# Patient Record
Sex: FEMALE | Race: WHITE | Employment: OTHER | URBAN - METROPOLITAN AREA
[De-identification: names, ages, dates, MRNs, and addresses within clinical notes are randomized per-mention and may not be internally consistent; named-entity substitution may affect disease eponyms.]

---

## 2017-02-22 RX ORDER — PRAVASTATIN SODIUM 40 MG/1
TABLET ORAL
Qty: 90 TAB | Refills: 0 | Status: SHIPPED | OUTPATIENT
Start: 2017-02-22 | End: 2017-11-29 | Stop reason: ALTCHOICE

## 2017-03-01 DIAGNOSIS — J98.01 BRONCHOSPASM: ICD-10-CM

## 2017-03-01 RX ORDER — MONTELUKAST SODIUM 10 MG/1
TABLET ORAL
Qty: 90 TAB | Refills: 1 | Status: SHIPPED | OUTPATIENT
Start: 2017-03-01 | End: 2017-09-28 | Stop reason: SDUPTHER

## 2017-05-03 ENCOUNTER — OFFICE VISIT (OUTPATIENT)
Dept: INTERNAL MEDICINE CLINIC | Age: 62
End: 2017-05-03

## 2017-05-03 VITALS
SYSTOLIC BLOOD PRESSURE: 160 MMHG | RESPIRATION RATE: 22 BRPM | HEIGHT: 63 IN | TEMPERATURE: 97.7 F | BODY MASS INDEX: 44.12 KG/M2 | DIASTOLIC BLOOD PRESSURE: 82 MMHG | HEART RATE: 49 BPM | WEIGHT: 249 LBS | OXYGEN SATURATION: 96 %

## 2017-05-03 DIAGNOSIS — I10 ESSENTIAL HYPERTENSION: ICD-10-CM

## 2017-05-03 DIAGNOSIS — Z11.59 NEED FOR HEPATITIS C SCREENING TEST: ICD-10-CM

## 2017-05-03 DIAGNOSIS — Z00.00 WELL ADULT EXAM: Primary | ICD-10-CM

## 2017-05-03 DIAGNOSIS — N39.41 URGE INCONTINENCE: ICD-10-CM

## 2017-05-03 DIAGNOSIS — R05.9 COUGH: ICD-10-CM

## 2017-05-03 DIAGNOSIS — E55.9 VITAMIN D DEFICIENCY: ICD-10-CM

## 2017-05-03 DIAGNOSIS — Z12.39 BREAST CANCER SCREENING: ICD-10-CM

## 2017-05-03 RX ORDER — LEVOCETIRIZINE DIHYDROCHLORIDE 5 MG/1
TABLET, FILM COATED ORAL
COMMUNITY
End: 2017-05-24

## 2017-05-03 RX ORDER — HYDROCHLOROTHIAZIDE 12.5 MG/1
12.5 TABLET ORAL DAILY
Qty: 30 TAB | Refills: 2 | Status: SHIPPED | OUTPATIENT
Start: 2017-05-03 | End: 2017-05-31 | Stop reason: SDUPTHER

## 2017-05-03 NOTE — PROGRESS NOTES
Jenifer Luong is a 57 y.o. female and presents with Complete Physical  .  Subjective:    Pt here for follow up annual.      Subjective:   Jenifer Luong is a 57 y.o. female with hypertension. Hypertension ROS: taking medications as instructed, no medication side effects noted, no TIA's, no chest pain on exertion, no dyspnea on exertion, no swelling of ankles. New concerns needs diuretic as her legs have edema      Cough  allergeis  On singulair  zytal 5 mg, flonase nasal spary  She has not seen allergist    incontinece  Working with dr. 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 hoarseness  Respiratory:  negative for cough, hemoptysis, dyspnea,wheezing  CV:   negative for chest pain, palpitations, lower extremity edema  GI:   negative for nausea, vomiting, diarrhea, abdominal pain,melena  Endo:               negative for polyuria,polydipsia,polyphagia,heat intolerance  Genitourinary: negative for frequency, dysuria and hematuria  Integument:  negative for rash and pruritus  Hematologic:  negative for easy bruising and gum/nose bleeding  Musculoskel: negative for myalgias, arthralgias, back pain, muscle weakness, joint pain  Neurological:  negative for headaches, dizziness, vertigo, memory problems and gait   Behavl/Psych: negative for feelings of anxiety, depression, mood changes    Past Medical History:   Diagnosis Date    Arthritis     total knee    Endometriosis 1/30/2009    resolved with hysterectomy    Hypertension     Ill-defined condition     IBS    Morbid obesity ([facility] [address]     Urge incontinence 8/25/2010    abdullahi galarza     Past Surgical History: Procedure Laterality Date    HX APPENDECTOMY  2010    HX COLONOSCOPY  11/8/15       10 years again    HX GYN      c section times 3    HX GYN      ectopic r fallopian tube resected    HX HYSTERECTOMY  1998    HX ORTHOPAEDIC  2006    right wrist fx    HX ORTHOPAEDIC  2010    left knee replacement     HX UROLOGICAL  2014,   12/3/15    Urethral sling      Social History     Social History    Marital status:      Spouse name: N/A    Number of children: N/A    Years of education: N/A     Social History Main Topics    Smoking status: Never Smoker    Smokeless tobacco: Never Used    Alcohol use 1.0 oz/week     2 Glasses of wine per week      Comment: on saturedays    Drug use: No    Sexual activity: Yes     Other Topics Concern    None     Social History Narrative    [employer]. She does the Financial, Administative very busy in the nursing    Enjoys it, not stressfull, flexibility            3 sons healthy:  Inflammatory Bowel 1 son             Family History   Problem Relation Age of Onset    Cancer Father      prostate ca    Cancer Sister      lung ca 55    Heart Disease Mother 68     a fib     Current Outpatient Prescriptions   Medication Sig Dispense Refill    levocetirizine (XYZAL) 5 mg tablet Take  by mouth.  montelukast (SINGULAIR) 10 mg tablet TAKE 1 TABLET NIGHTLY 90 Tab 1    pravastatin (PRAVACHOL) 40 mg tablet TAKE 1 TABLET NIGHTLY 90 Tab 0    valsartan (DIOVAN) 320 mg tablet Take 1 Tab by mouth daily. Indications: HYPERTENSION 90 Tab 3    fluticasone (FLONASE) 50 mcg/actuation nasal spray 2 Sprays by Both Nostrils route daily. 1 Bottle 6    TURMERIC ROOT EXTRACT PO Take  by mouth.  FIBER, PSYLLIUM HUSK, PO Take  by mouth daily.  magnesium 250 mg tab Take 400 mg by mouth daily.  omega-3 fatty acids-vitamin e (FISH OIL) 1,000 mg Cap Take 3 capsules by mouth daily.       cholecalciferol, vitamin d3, (VITAMIN D) 1,000 unit tablet Take 2,000 Units by mouth daily.  diphenoxylate-atropine (LOMOTIL) 2.5-0.025 mg per tablet Take 1 Tab by mouth three (3) times daily as needed for Diarrhea. Max Daily Amount: 3 Tabs. 30 Tab 0    fluticasone-salmeterol (ADVAIR) 250-50 mcg/dose diskus inhaler Take 1 Puff by inhalation two (2) times a day. MUST rinse mouth after use 2 Inhaler 3    albuterol (PROVENTIL HFA, VENTOLIN HFA, PROAIR HFA) 90 mcg/actuation inhaler Take 2 Puffs by inhalation every four (4) hours as needed for Wheezing or Shortness of Breath. 1 Inhaler 6    cetirizine (ZYRTEC) 10 mg tablet Take  by mouth daily. Allergies   Allergen Reactions    Codeine Nausea and Vomiting    Erythromycin Rash    Hydrocodone Rash       Objective:  Visit Vitals    /70 (BP 1 Location: Left arm, BP Patient Position: Sitting)    Pulse (!) 49    Temp 97.7 °F (36.5 °C) (Oral)    Resp 22    Ht 5' 3\" (1.6 m)    Wt 249 lb (112.9 kg)    SpO2 96%    BMI 44.11 kg/m2     Physical Exam:   General appearance - alert, well appearing, and in no distress and overweight  Mental status - alert, oriented to person, place, and time  EYE-PATRICK, EOMI, no foreign bodies, visual acuity normal both eyes, cerumen left ear  ENT-ENT exam normal, no neck nodes or sinus tenderness  Nose - normal and patent, no erythema, discharge or polyps, enlarged nasal turbinates  Mouth - mucous membranes moist, pharynx normal without lesions  Neck - supple, no significant adenopathy   Chest - clear to auscultation, no wheezes, rales or rhonchi, symmetric air entry   Heart - normal rate, regular rhythm, normal S1, S2, no murmurs, rubs, clicks or gallops   Abdomen - soft, nontender, nondistended, no masses or organomegaly  Lymph- no adenopathy palpable  Ext-peripheral pulses normal, no pedal edema, no clubbing or cyanosis  Skin-Warm and dry.  no hyperpigmentation, vitiligo, or suspicious lesions  Neuro -alert, oriented, normal speech, no focal findings or movement disorder noted  Neck-normal C-spine, no tenderness, full ROM without pain      Results for orders placed or performed in visit on 10/31/16   CULTURE, URINE   Result Value Ref Range    Urine Culture, Routine No growth    AMB POC URINALYSIS DIP STICK AUTO W/O MICRO   Result Value Ref Range    Color (UA POC) Yellow     Clarity (UA POC) Cloudy     Glucose (UA POC) Negative Negative    Bilirubin (UA POC) Negative Negative    Ketones (UA POC) Negative Negative    Specific gravity (UA POC) 1.020 1.001 - 1.035    Blood (UA POC) 1+ Negative    pH (UA POC) 5.5 4.6 - 8.0    Protein (UA POC) Negative Negative mg/dL    Urobilinogen (UA POC) 0.2 mg/dL 0.2 - 1    Nitrites (UA POC) Negative Negative    Leukocyte esterase (UA POC) 3+ Negative       Labs: cmp reviewed with pt from January      Prevention    Cardiovascular profile  Family hx  Exercising:  Walking   Blood pressure:  Health healthy diet:  Diabetes:  Cholesterol:  Renal function:      Cancer risk profile  Mammogram 10/15  Lung  Colonoscopy 11/15  Skin nonhealing in 2 weeks none, Dr. Cervantes Abt abnormal bleeding/discharge/abd pain/pressure hysterectomy      Thyroid sx  constipation    Osteopenia prevention stopped due to kidney stone  BMD  Family hx of kidney stones  Calcium 1000mg/day yes  Vitamin D 800iu/day yes    Mental health scale: 7-8/10 , 6-7/10 sad since residential  Depression  Anxiety  Sleep # of hours:  Energy Level:        Immunizations  TDAP  Pneumonia vaccine pneumovax  Flu vaccine  Shingles vaccine  HPV  Shingles vaccine not a bad outbreak        Edmar Feldman was seen today for complete physical.    Diagnoses and all orders for this visit:    Well adult exam  Pt appears in overall good health. She has a little dysthymia re: her residential. Will monitor. Discussed and will take opportunity to get health improved with allergy evaluation and BMI weight loss.  Pt is excited to do program.    Need for hepatitis C screening test  -     HEPATITIS C AB    Essential hypertension  Add hctz new med  Sed  Follow up in 1 month  -     METABOLIC PANEL, COMPREHENSIVE  -     LIPID PANEL  -     MICROALBUMIN, UR, RAND W/ MICROALBUMIN/CREA RATIO  -     URINALYSIS W/ RFLX MICROSCOPIC    Urge incontinence  Follow up with dr. Manny New    [facility] 40.0-44.9, [facility]  -     REFERRAL TO WEIGHT LOSS  -     TSH 3RD GENERATION    Cough  -     REFERRAL TO ALLERGY  -     CBC W/O DIFF    Breast cancer screening  -     MARIA R MAMMO BI SCREENING INCL CAD; Future    Vitamin D deficiency  -     VITAMIN D, 25 HYDROXY    Other orders  -     hydroCHLOROthiazide (HYDRODIURIL) 12.5 mg tablet; Take 1 Tab by mouth daily. This note will not be viewable in [address]

## 2017-05-03 NOTE — MR AVS SNAPSHOT
Visit Information Date & Time Provider Department Dept. Phone Encounter #  
 5/3/2017  8:45 AM Aries Kaur MD [facility] [address] [id number] Upcoming Health Maintenance Date Due Hepatitis C Screening 1955 FOBT Q 1 YEAR AGE 50-75 5/31/2017 INFLUENZA AGE 9 TO ADULT 8/1/2017 BREAST CANCER SCRN MAMMOGRAM 10/22/2017 PAP AKA CERVICAL CYTOLOGY 5/31/2019 DTaP/Tdap/Td series (2 - Td) 5/31/2026 Allergies as of 5/3/2017  Review Complete On: 5/3/2017 By: Autumn Farmer LPN Severity Noted Reaction Type Reactions Codeine  08/25/2010    Nausea and Vomiting Erythromycin  11/21/2008    Rash Hydrocodone  11/24/2015    Rash Current Immunizations  Reviewed on 5/31/2016 Name Date Tdap 5/31/2016 Zoster Vaccine, Live 3/7/2012 Not reviewed this visit You Were Diagnosed With   
  
 Codes Comments Well adult exam    -  Primary ICD-10-CM: Z00.00 ICD-9-CM: V70.0 Need for hepatitis C screening test     ICD-10-CM: Z11.59 
ICD-9-CM: V73.89 Essential hypertension     ICD-10-CM: I10 
ICD-9-CM: 401.9 Urge incontinence     ICD-10-CM: N39.41 
ICD-9-CM: 788.31   
 BMI 40.0-44.9, adult (HCC)     ICD-10-CM: Z68.41 
ICD-9-CM: V85.41 Cough     ICD-10-CM: R05 ICD-9-CM: 786.2 Breast cancer screening     ICD-10-CM: Z12.39 
ICD-9-CM: V76.10 Vitamin D deficiency     ICD-10-CM: E55.9 ICD-9-CM: 268.9 Vitals BP Pulse Temp Resp Height(growth percentile) Weight(growth percentile) 160/82 (BP 1 Location: Left arm, BP Patient Position: Sitting) (!) 49 97.7 °F (36.5 °C) (Oral) 22 5' 3\" (1.6 m) 249 lb (112.9 kg) SpO2 BMI OB Status Smoking Status 96% 44.11 kg/m2 Hysterectomy Never Smoker Vitals History BMI and BSA Data Body Mass Index Body Surface Area  
 44.11 kg/m 2 2.24 m 2 Preferred Pharmacy Pharmacy Name Phone [id number] [address] ([place] [address]) [phone] Your Updated Medication List  
  
   
This list is accurate as of: 5/3/17  9:56 AM.  Always use your most recent med list.  
  
  
  
  
 albuterol 90 mcg/actuation inhaler Commonly known as:  PROVENTIL HFA, VENTOLIN HFA, PROAIR HFA Take 2 Puffs by inhalation every four (4) hours as needed for Wheezing or Shortness of Breath. diphenoxylate-atropine 2.5-0.025 mg per tablet Commonly known as:  LOMOTIL Take 1 Tab by mouth three (3) times daily as needed for Diarrhea. Max Daily Amount: 3 Tabs. FIBER (PSYLLIUM HUSK) PO Take  by mouth daily. FISH OIL 1,000 mg Cap Generic drug:  omega-3 fatty acids-vitamin e Take 3 capsules by mouth daily. fluticasone 50 mcg/actuation nasal spray Commonly known as:  Stoke 2 Sprays by Both Nostrils route daily. fluticasone-salmeterol 250-50 mcg/dose diskus inhaler Commonly known as:  ADVAIR Take 1 Puff by inhalation two (2) times a day. MUST rinse mouth after use  
  
 hydroCHLOROthiazide 12.5 mg tablet Commonly known as:  HYDRODIURIL Take 1 Tab by mouth daily. magnesium 250 mg Tab Take 400 mg by mouth daily. montelukast 10 mg tablet Commonly known as:  SINGULAIR  
TAKE 1 TABLET NIGHTLY  
  
 pravastatin 40 mg tablet Commonly known as:  PRAVACHOL  
TAKE 1 TABLET NIGHTLY TURMERIC ROOT EXTRACT PO Take  by mouth.  
  
 valsartan 320 mg tablet Commonly known as:  DIOVAN Take 1 Tab by mouth daily. Indications: HYPERTENSION  
  
 VITAMIN D3 1,000 unit tablet Generic drug:  cholecalciferol Take 2,000 Units by mouth daily. XYZAL 5 mg tablet Generic drug:  levocetirizine Take  by mouth. ZyrTEC 10 mg tablet Generic drug:  cetirizine Take  by mouth daily. Prescriptions Sent to Pharmacy  Refills  
 hydroCHLOROthiazide (HYDRODIURIL) 12.5 mg tablet 2  
 Sig: Take 1 Tab by mouth daily. Class: Normal  
 Pharmacy: [id number]- [address], [address] ([place] [address]) Ph #: [phone] Route: Oral  
  
We Performed the Following CBC W/O DIFF [75141 CPT(R)] HEPATITIS C AB [30524 CPT(R)] LIPID PANEL [84359 CPT(R)] METABOLIC PANEL, COMPREHENSIVE [92704 CPT(R)] MICROALBUMIN, UR, RAND W/ MICROALBUMIN/CREA RATIO [id number] CPT(R)] REFERRAL TO ALLERGY [REF5 Custom] Comments:  
 Perley Alpers, persistent cough likely allergy related please eval and jw REFERRAL TO WEIGHT LOSS [HUT721 Custom] Comments:  
 Please evaluate patient for weight loss. TSH 3RD GENERATION [10412 CPT(R)] URINALYSIS W/ RFLX MICROSCOPIC [71494 CPT(R)] VITAMIN D, 25 HYDROXY [id number] CPT(R)] To-Do List   
 10/03/2017 Imaging:  MARIA R MAMMO BI SCREENING INCL CAD Referral Information Referral ID Referred By Referred To  
  
 [id number] Lj People E Not Available Visits Status Start Date End Date 1 New Request 5/3/17 5/3/18 If your referral has a status of pending review or denied, additional information will be sent to support the outcome of this decision. Referral ID Referred By Referred To  
 [id number] Gogo Day MD  
   [address] [place] [address] Phone: [phone] Fax: [phone] Visits Status Start Date End Date 1 New Request 5/3/17 5/3/18 If your referral has a status of pending review or denied, additional information will be sent to support the outcome of this decision. Introducing [facility]! Dear Paul Miranda: Thank you for requesting a Quture account. Our records indicate that you already have an active Quture account. You can access your account anytime at [email or web address]. [employer]/[facility] Did you know that you can access your hospital and ER discharge instructions at any time in Exablox? You can also review all of your test results from your hospital stay or ER visit. Additional Information If you have questions, please visit the Frequently Asked Questions section of the Exablox website at [email or web address] Remember, Exablox is NOT to be used for urgent needs. For medical emergencies, dial 911. Now available from your iPhone and Android! Please provide this summary of care documentation to your next provider. Your primary care clinician is listed as Marielos Kaur. If you have any questions after today's visit, please call [phone].

## 2017-05-05 LAB
25(OH)D3+25(OH)D2 SERPL-MCNC: 28.1 NG/ML (ref 30–100)
ALBUMIN SERPL-MCNC: 4.2 G/DL (ref 3.6–4.8)
ALBUMIN/CREAT UR: 3.2 MG/G CREAT (ref 0–30)
ALBUMIN/GLOB SERPL: 1.8 {RATIO} (ref 1.2–2.2)
ALP SERPL-CCNC: 56 IU/L (ref 39–117)
ALT SERPL-CCNC: 51 IU/L (ref 0–32)
APPEARANCE UR: ABNORMAL
AST SERPL-CCNC: 32 IU/L (ref 0–40)
BACTERIA #/AREA URNS HPF: ABNORMAL /[HPF]
BILIRUB SERPL-MCNC: 0.6 MG/DL (ref 0–1.2)
BILIRUB UR QL STRIP: NEGATIVE
BUN SERPL-MCNC: 16 MG/DL (ref 8–27)
BUN/CREAT SERPL: 21 (ref 12–28)
CALCIUM SERPL-MCNC: 10 MG/DL (ref 8.7–10.3)
CASTS URNS QL MICRO: ABNORMAL /LPF
CHLORIDE SERPL-SCNC: 103 MMOL/L (ref 96–106)
CHOLEST SERPL-MCNC: 151 MG/DL (ref 100–199)
CO2 SERPL-SCNC: 23 MMOL/L (ref 18–29)
COLOR UR: YELLOW
CREAT SERPL-MCNC: 0.76 MG/DL (ref 0.57–1)
CREAT UR-MCNC: 101.2 MG/DL
EPI CELLS #/AREA URNS HPF: ABNORMAL /HPF
ERYTHROCYTE [DISTWIDTH] IN BLOOD BY AUTOMATED COUNT: 13.2 % (ref 12.3–15.4)
GLOBULIN SER CALC-MCNC: 2.4 G/DL (ref 1.5–4.5)
GLUCOSE SERPL-MCNC: 105 MG/DL (ref 65–99)
GLUCOSE UR QL: NEGATIVE
HCT VFR BLD AUTO: 40.1 % (ref 34–46.6)
HCV AB S/CO SERPL IA: <0.1 S/CO RATIO (ref 0–0.9)
HDLC SERPL-MCNC: 44 MG/DL
HGB BLD-MCNC: 13.2 G/DL (ref 11.1–15.9)
HGB UR QL STRIP: NEGATIVE
INTERPRETATION, 910389: NORMAL
KETONES UR QL STRIP: NEGATIVE
LDLC SERPL CALC-MCNC: 76 MG/DL (ref 0–99)
LEUKOCYTE ESTERASE UR QL STRIP: ABNORMAL
MCH RBC QN AUTO: 29.2 PG (ref 26.6–33)
MCHC RBC AUTO-ENTMCNC: 32.9 G/DL (ref 31.5–35.7)
MCV RBC AUTO: 89 FL (ref 79–97)
MICRO URNS: ABNORMAL
MICROALBUMIN UR-MCNC: 3.2 UG/ML
MUCOUS THREADS URNS QL MICRO: PRESENT
NITRITE UR QL STRIP: NEGATIVE
PH UR STRIP: 6 [PH] (ref 5–7.5)
PLATELET # BLD AUTO: 166 X10E3/UL (ref 150–379)
POTASSIUM SERPL-SCNC: 4.5 MMOL/L (ref 3.5–5.2)
PROT SERPL-MCNC: 6.6 G/DL (ref 6–8.5)
PROT UR QL STRIP: NEGATIVE
RBC # BLD AUTO: 4.52 X10E6/UL (ref 3.77–5.28)
RBC #/AREA URNS HPF: ABNORMAL /HPF
SODIUM SERPL-SCNC: 141 MMOL/L (ref 134–144)
SP GR UR: 1.02 (ref 1–1.03)
TRIGL SERPL-MCNC: 154 MG/DL (ref 0–149)
TSH SERPL DL<=0.005 MIU/L-ACNC: 2.23 UIU/ML (ref 0.45–4.5)
UROBILINOGEN UR STRIP-MCNC: 0.2 MG/DL (ref 0.2–1)
VLDLC SERPL CALC-MCNC: 31 MG/DL (ref 5–40)
WBC # BLD AUTO: 4.7 X10E3/UL (ref 3.4–10.8)
WBC #/AREA URNS HPF: >30 /HPF
YEAST #/AREA URNS HPF: PRESENT /[HPF]

## 2017-05-11 RX ORDER — CLONIDINE HYDROCHLORIDE 0.1 MG/1
0.1 TABLET ORAL 2 TIMES DAILY
Qty: 60 TAB | Refills: 1 | Status: SHIPPED | OUTPATIENT
Start: 2017-05-11 | End: 2017-05-31 | Stop reason: ALTCHOICE

## 2017-05-24 ENCOUNTER — OFFICE VISIT (OUTPATIENT)
Dept: FAMILY MEDICINE CLINIC | Age: 62
End: 2017-05-24

## 2017-05-24 VITALS
OXYGEN SATURATION: 96 % | WEIGHT: 237.9 LBS | BODY MASS INDEX: 42.15 KG/M2 | RESPIRATION RATE: 17 BRPM | DIASTOLIC BLOOD PRESSURE: 79 MMHG | SYSTOLIC BLOOD PRESSURE: 150 MMHG | HEIGHT: 63 IN | HEART RATE: 54 BPM | TEMPERATURE: 97.6 F

## 2017-05-24 DIAGNOSIS — R06.83 SNORING: ICD-10-CM

## 2017-05-24 DIAGNOSIS — Z13.1 SCREENING FOR DIABETES MELLITUS: ICD-10-CM

## 2017-05-24 DIAGNOSIS — R79.89 ABNORMAL LIVER FUNCTION TEST: ICD-10-CM

## 2017-05-24 DIAGNOSIS — I10 ESSENTIAL HYPERTENSION: Primary | ICD-10-CM

## 2017-05-24 DIAGNOSIS — E66.01 SEVERE OBESITY (BMI >= 40) (HCC): ICD-10-CM

## 2017-05-24 NOTE — MR AVS SNAPSHOT
Visit Information Date & Time Provider Department Dept. Phone Encounter #  
 5/24/2017  2:00 PM Mingo Ann MD [address] [place] [address] [id number] Your Appointments 5/31/2017  8:00 AM  
ROUTINE CARE with Chan Zhang MD  
[facility]) Appt Note: 1 month [address] [place] [id number]  
[phone]  
  
   
 [address] [facility] [address] Upcoming Health Maintenance Date Due FOBT Q 1 YEAR AGE 50-75 5/31/2017 INFLUENZA AGE 9 TO ADULT 8/1/2017 BREAST CANCER SCRN MAMMOGRAM 10/22/2017 PAP AKA CERVICAL CYTOLOGY 5/31/2019 DTaP/Tdap/Td series (2 - Td) 5/31/2026 Allergies as of 5/24/2017  Review Complete On: 5/24/2017 By: Mingo Ann MD  
  
 Severity Noted Reaction Type Reactions Codeine  08/25/2010    Nausea and Vomiting Erythromycin  11/21/2008    Rash Hydrocodone  11/24/2015    Rash Current Immunizations  Reviewed on 5/31/2016 Name Date Tdap 5/31/2016 Zoster Vaccine, Live 3/7/2012 Not reviewed this visit You Were Diagnosed With   
  
 Codes Comments Essential hypertension    -  Primary ICD-10-CM: I10 
ICD-9-CM: 401.9 Severe obesity (BMI >= 40) (HCC)     ICD-10-CM: E66.01 
ICD-9-CM: 278.01 Abnormal liver function test     ICD-10-CM: R79.89 ICD-9-CM: 790.6 Snoring     ICD-10-CM: R06.83 
ICD-9-CM: 786.09 Screening for diabetes mellitus     ICD-10-CM: Z13.1 ICD-9-CM: V77.1 Vitals BP Pulse Temp Resp Height(growth percentile) Weight(growth percentile) 150/79 (!) 54 97.6 °F (36.4 °C) (Oral) 17 5' 3\" (1.6 m) 237 lb 14.4 oz (107.9 kg) SpO2 BMI OB Status Smoking Status 96% 42.14 kg/m2 Hysterectomy Never Smoker Vitals History BMI and BSA Data Body Mass Index Body Surface Area  
 42.14 kg/m 2 2.19 m 2 Preferred Pharmacy Pharmacy Name Phone [facility]/PHARMACY #[id number]- [place], [address] ([place] [address]) [phone] Your Updated Medication List  
  
   
This list is accurate as of: 5/24/17  3:07 PM.  Always use your most recent med list.  
  
  
  
  
 albuterol 90 mcg/actuation inhaler Commonly known as:  PROVENTIL HFA, VENTOLIN HFA, PROAIR HFA Take 2 Puffs by inhalation every four (4) hours as needed for Wheezing or Shortness of Breath. cloNIDine HCl 0.1 mg tablet Commonly known as:  CATAPRES Take 1 Tab by mouth two (2) times a day. diphenoxylate-atropine 2.5-0.025 mg per tablet Commonly known as:  LOMOTIL Take 1 Tab by mouth three (3) times daily as needed for Diarrhea. Max Daily Amount: 3 Tabs. FIBER (PSYLLIUM HUSK) PO Take  by mouth daily. FISH OIL 1,000 mg Cap Generic drug:  omega-3 fatty acids-vitamin e Take 3 capsules by mouth daily. fluticasone 50 mcg/actuation nasal spray Commonly known as:  Aracelis Rigoberto 2 Sprays by Both Nostrils route daily. fluticasone-salmeterol 250-50 mcg/dose diskus inhaler Commonly known as:  ADVAIR Take 1 Puff by inhalation two (2) times a day. MUST rinse mouth after use  
  
 hydroCHLOROthiazide 12.5 mg tablet Commonly known as:  HYDRODIURIL Take 1 Tab by mouth daily. magnesium 250 mg Tab Take 400 mg by mouth daily. montelukast 10 mg tablet Commonly known as:  SINGULAIR  
TAKE 1 TABLET NIGHTLY  
  
 pravastatin 40 mg tablet Commonly known as:  PRAVACHOL  
TAKE 1 TABLET NIGHTLY TURMERIC ROOT EXTRACT PO Take  by mouth.  
  
 valsartan 320 mg tablet Commonly known as:  DIOVAN Take 1 Tab by mouth daily. Indications: HYPERTENSION  
  
 VITAMIN D3 1,000 unit tablet Generic drug:  cholecalciferol Take 2,000 Units by mouth daily. ZyrTEC 10 mg tablet Generic drug:  cetirizine Take  by mouth daily. We Performed the Following HEMOGLOBIN A1C WITH EAG [00588 CPT(R)] REFERRAL TO SLEEP STUDIES [REF99 Custom] Comments:  
 She snores, secerely obese and malampatti 4. eval and treat for DRE To-Do List   
 05/24/2017 Imaging:  US ABD LTD   
  
 05/30/2017 5:00 PM  
(Arrive by 4:45 PM) Appointment with [facility] MARIA R 1 at [facility] ([phone]) Shower or bathe using soap and water. Do not use deodorant, powder, perfumes, or lotion the day of your exam.  If your prior mammograms were not performed at [facility] 6 please bring films with you or forward prior images 2 days before your procedure. Check in at registration 15min before your appointment time unless you were instructed to do otherwise. A script is not necessary, but if you have one, please bring it on the day of the mammogram or have it faxed to the department. [facility] [phone] [facility]  [phone] [facility]  [phone] [facility] [phone] [address] [phone] Please arrive 15 minutes prior to appointment to register Referral Information Referral ID Referred By Referred To  
  
 [id number] [place], [address] [place] [address], [address] [place] [address] Desiree Bonnie Kayyjeremiah Dang  Phone: [phone] Fax: [phone] Visits Status Start Date End Date 1 New Request 5/24/17 5/24/18 If your referral has a status of pending review or denied, additional information will be sent to support the outcome of this decision. Patient Instructions Hemoglobin A1c: About This Test 
What is it? Hemoglobin A1c is a blood test that checks your average blood sugar level over the past 2 to 3 months. This test also is called a glycohemoglobin test or an A1c test. 
Why is this test done? The A1c test is done to check how well your diabetes has been controlled over the past 2 to 3 months. Your doctor can use this information to adjust your medicine and diabetes treatment, if needed. How can you prepare for the test? 
You do not need to stop eating before you have an A1c test. This test can be done at any time during the day, even after a meal. 
What happens during the test? 
The health professional taking a sample of your blood will: · Wrap an elastic band around your upper arm. This makes the veins below the band larger so it is easier to put a needle into the vein. · Clean the needle site with alcohol. · Put the needle into the vein. · Attach a tube to the needle to fill it with blood. · Remove the band from your arm when enough blood is collected. · Put a gauze pad or cotton ball over the needle site as the needle is removed. · Put pressure on the site and then put on a bandage. What else should you know about the test? 
The test result is usually given as a percentage. The normal A1c is less than 5.7%. The A1c test result also can be used to find your estimated average glucose, or eAG. Your eAG and A1c show the same thing in two different ways. They both help you learn more about your average blood sugar range over the past 2 to 3 months. Where can you learn more? Go to http://jose-darvin.info/. Enter U216 in the search box to learn more about \"Hemoglobin A1c: About This Test.\" Current as of: May 23, 2016 Content Version: 11.2 © 9349-8134 Healthwise, Incorporated. Care instructions adapted under license by [employer] (which disclaims liability or warranty for this information). If you have questions about a medical condition or this instruction, always ask your healthcare professional. [place] [address] any warranty or liability for your use of this information. Introducing [facility]! Dear Wagner Contreras: Thank you for requesting a "[employer]" account. Our records indicate that you already have an active "[employer]" account. You can access your account anytime at [email or web address] Did you know that you can access your hospital and ER discharge instructions at any time in [email or web address]? You can also review all of your test results from your hospital stay or ER visit. Additional Information If you have questions, please visit the Frequently Asked Questions section of the [email or web address] website at [email or web address] Remember, [email or web address] is NOT to be used for urgent needs. For medical emergencies, dial 911. Now available from your iPhone and Android! Please provide this summary of care documentation to your next provider. Your primary care clinician is listed as Isela Bacon. If you have any questions after today's visit, please call [phone].

## 2017-05-24 NOTE — PROGRESS NOTES
1. Have you been to the ER, urgent care clinic since your last visit? Hospitalized since your last visit? No    2. Have you seen or consulted any other health care providers outside of the [address] [place] [address] since your last visit? Include any pap smears or colon screening.  No     Chief Complaint   Patient presents with    Weight Management       Body Weight: 237.9  Body Fat%: 46.6  Muscle Mass Weight: 41.8  Body Water Weight: 99.5  Basal Metabolic Rate: 4368  BMI: 42.14

## 2017-05-24 NOTE — PATIENT INSTRUCTIONS
Hemoglobin A1c: About This Test  What is it? Hemoglobin A1c is a blood test that checks your average blood sugar level over the past 2 to 3 months. This test also is called a glycohemoglobin test or an A1c test.  Why is this test done? The A1c test is done to check how well your diabetes has been controlled over the past 2 to 3 months. Your doctor can use this information to adjust your medicine and diabetes treatment, if needed. How can you prepare for the test?  You do not need to stop eating before you have an A1c test. This test can be done at any time during the day, even after a meal.  What happens during the test?  The health professional taking a sample of your blood will:  · Wrap an elastic band around your upper arm. This makes the veins below the band larger so it is easier to put a needle into the vein. · Clean the needle site with alcohol. · Put the needle into the vein. · Attach a tube to the needle to fill it with blood. · Remove the band from your arm when enough blood is collected. · Put a gauze pad or cotton ball over the needle site as the needle is removed. · Put pressure on the site and then put on a bandage. What else should you know about the test?  The test result is usually given as a percentage. The normal A1c is less than 5.7%. The A1c test result also can be used to find your estimated average glucose, or eAG. Your eAG and A1c show the same thing in two different ways. They both help you learn more about your average blood sugar range over the past 2 to 3 months. Where can you learn more? Go to http://jose-darvin.info/. Enter U216 in the search box to learn more about \"Hemoglobin A1c: About This Test.\"  Current as of: May 23, 2016  Content Version: 11.2  © 1052-8734 [employer]. Care instructions adapted under license by [employer] (which disclaims liability or warranty for this information).  If you have questions about a medical condition or this instruction, always ask your healthcare professional. [place] [address] any warranty or liability for your use of this information.

## 2017-05-24 NOTE — PROGRESS NOTES
Weight Loss Progress Note: Initial Physician Visit      Vivi Collier is a 57 y.o. female with BMI    who is here for her Initial Evaluation for the medical bariatric care. B:Coffee and toast  Sometimes eggs  Skips lunch and no snacks  Boiled cabbage and sausage and bread last night- 6:30 or 7 pm. Then goes to bed at 9    Takes vit D supplement 2,000 a day      CC: I want to lose weight    Weight History  Current weight 237 and BMI is Body mass index is 42.14 kg/(m^2). Goal weight 199  Highest weight 237  (See weight gain time line scanned into media section of chart)    Weight loss History  How many weight loss attempts have you had? Weight watchers several times but regained after  Which program were you most successful doing? Significant Medical History    Have you ever taken appetite suppressants? no   If yes: Rx or OTC? If yes; Any negative side effects? Ever diagnosed with sleep apnea or put on CPAP never tested    Ever had bariatric surgery: no    Pregnant or planning on becoming pregnant w/in 6 months: no    If female: A3    Significant Psychosocial History   Any history of drug abuse or dependence: no  Current Major Lifestyle Changes: no  Any potential unsupportive people: no  Why are you starting a weight loss program now? Are you ready?  no    History of binge eating disorder or anorexia : no   If yes, are you currently being treated no    Social History  Social History   Substance Use Topics    Smoking status: Never Smoker    Smokeless tobacco: Never Used    Alcohol use 1.0 oz/week     2 Glasses of wine per week      Comment: on ys     How many times a week do you eat out?  3-4    Do you drink any EtOH? yes   If so, how much? Weekends share a bottle of wine with     Do you have upcoming any travel in the next 6 weeks?  no   If so, what do you have planned?           Exercise  How many days a week do you currently exercise?  0 days  Have you ever been told by a physician not to exercise?  no      Objective  Visit Vitals    /79    Pulse (!) 54    Temp 97.6 °F (36.4 °C) (Oral)    Resp 17    Ht 5' 3\" (1.6 m)    Wt 237 lb 14.4 oz (107.9 kg)    SpO2 96%    BMI 42.14 kg/m2       Waist Circumference: See Weight Management Doc Flowsheet  Neck Circumference: See Weight Management Doc Flowsheet  Percent Body Fat: See Weight Management Doc Flowsheet  Weight Metrics 5/24/2017 5/3/2017 10/31/2016 8/26/2016 5/31/2016 3/22/2016 2/22/2016   Weight 237 lb 14.4 oz 249 lb 243 lb 237 lb 249 lb 246 lb 244 lb   Neck Circ (inches) 17 - - - - - -   Waist Measure Inches 47 - - - - - -   Exercise Mins/week 0 - - - - - -   Body Fat % 46.6 - - - - - -   BMI 42.14 kg/m2 44.11 kg/m2 43.05 kg/m2 41.98 kg/m2 44.12 kg/m2 43.59 kg/m2 43.23 kg/m2         Labs:       Review of Systems  Complete ROS negative except where noted above      Physical Exam    Vital Signs Reviewed  Weight Management Metrics Reviewed    Vital Signs Reviewed  Appearance: Obese, A&O x 3, NAD  HEENT:  NC/AT, EOMI, PERRL, No scleral icterus, malampatti score:4  Skin:    Skin tags - no   Acanthosis Nigricans - no  Neck:  No nodes, thyromegaly no  Heart:  RRR without M/R/G  Lungs:  CTAB, no rhonchi, rales, or wheezes with good air exchange   Abdomen:  Non-tender, pos bowel sounds; hepatomegaly - no  Ext:  No C/C/E        Assessment & Plan  Nikita Romero was seen today for weight management. Diagnoses and all orders for this visit:    Essential hypertension  -     HEMOGLOBIN A1C WITH EAG    Severe obesity (BMI >= 40) (HCC)  -     REFERRAL TO SLEEP STUDIES  -     HEMOGLOBIN A1C WITH EAG  -     Flooved LTD; Future  Diet Plan:day 1-4      Activity Plan: join a gym and start doing 4 hours of exercise a week    Medication Plan: none at this time. Will see if she needs appetite support when she returns in 2 weeks  Abnormal liver function test  -     HEMOGLOBIN A1C WITH EAG  -     Flooved LTD;  Future    Snoring  -     REFERRAL TO SLEEP STUDIES    Screening for diabetes mellitus  -     HEMOGLOBIN A1C WITH EAG        Based on his history and exam, Mirtha Sweeney is a good candidate for the Non-[facility] Weight Loss Program           There are no Patient Instructions on file for this visit. Follow-up Disposition: Not on File    Over 50% of the 30 minutes face to face time with Jerardo Proffer consisted of counseling & coordinating and/or discussing treatment plans in reference to her obesity The primary encounter diagnosis was Essential hypertension. Diagnoses of Severe obesity (BMI >= 40) (HCC), Abnormal liver function test, Snoring, and Screening for diabetes mellitus were also pertinent to this visit.

## 2017-05-25 LAB
EST. AVERAGE GLUCOSE BLD GHB EST-MCNC: 123 MG/DL
HBA1C MFR BLD: 5.9 % (ref 4.8–5.6)

## 2017-05-30 ENCOUNTER — HOSPITAL ENCOUNTER (OUTPATIENT)
Dept: MAMMOGRAPHY | Age: 62
Discharge: HOME | End: 2017-05-30
Attending: INTERNAL MEDICINE
Payer: COMMERCIAL

## 2017-05-30 DIAGNOSIS — Z12.39 BREAST CANCER SCREENING: ICD-10-CM

## 2017-05-30 PROCEDURE — 77067 SCR MAMMO BI INCL CAD: CPT

## 2017-05-31 ENCOUNTER — OFFICE VISIT (OUTPATIENT)
Dept: INTERNAL MEDICINE CLINIC | Age: 62
End: 2017-05-31

## 2017-05-31 VITALS
WEIGHT: 240 LBS | OXYGEN SATURATION: 98 % | DIASTOLIC BLOOD PRESSURE: 82 MMHG | BODY MASS INDEX: 42.52 KG/M2 | SYSTOLIC BLOOD PRESSURE: 132 MMHG | TEMPERATURE: 98.1 F | RESPIRATION RATE: 18 BRPM | HEART RATE: 61 BPM | HEIGHT: 63 IN

## 2017-05-31 DIAGNOSIS — M54.14 THORACIC RADICULOPATHY: ICD-10-CM

## 2017-05-31 DIAGNOSIS — K58.0 IRRITABLE BOWEL SYNDROME WITH DIARRHEA: ICD-10-CM

## 2017-05-31 DIAGNOSIS — I10 ESSENTIAL HYPERTENSION: ICD-10-CM

## 2017-05-31 DIAGNOSIS — M79.601 RIGHT ARM PAIN: ICD-10-CM

## 2017-05-31 DIAGNOSIS — I10 ESSENTIAL HYPERTENSION: Primary | ICD-10-CM

## 2017-05-31 RX ORDER — DIPHENOXYLATE HYDROCHLORIDE AND ATROPINE SULFATE 2.5; .025 MG/1; MG/1
1 TABLET ORAL
Qty: 90 TAB | Refills: 0 | Status: SHIPPED | OUTPATIENT
Start: 2017-05-31 | End: 2018-07-17 | Stop reason: SDUPTHER

## 2017-05-31 RX ORDER — BENZOCAINE .13; .15; .5; 2 G/100G; G/100G; G/100G; G/100G
GEL ORAL
COMMUNITY
End: 2019-10-15 | Stop reason: ALTCHOICE

## 2017-05-31 RX ORDER — HYDROCHLOROTHIAZIDE 12.5 MG/1
12.5 TABLET ORAL DAILY
Qty: 90 TAB | Refills: 2 | Status: SHIPPED | OUTPATIENT
Start: 2017-05-31 | End: 2017-08-28 | Stop reason: SDUPTHER

## 2017-05-31 NOTE — PROGRESS NOTES
Chief Complaint   Patient presents with    Hypertension     Subjective:   Vivi Collier is a 60 y.o. female with hypertension. Hypertension ROS: taking medications as instructed, no medication side effects noted, no TIA's, no chest pain on exertion, no dyspnea on exertion, no swelling of ankles. New concerns: pt is running out of medication. She notes the hctz is helping with her .     bmi  Pt saw Dr. Carol Swan   She does not think she has sleep apnea and cancelled her us as well  She has been on this type of diet in the past and lost weight. She will follow up with Dr. Saleem Grimm in mid June    Right arm pain  She reports she has pain in her right inner arm. It is worse when lying down on it. She has no shoulder rom issues. The pain feels like it radiates up in to her neck and down to her wrist. The gabapentin does not help. nsaids and tylenol also no relief. She does not recall trauma but she has been moving lots of boxes. She is not interested in intervention right now.       ibs  Stable and better since not working and less stress    Past Medical History:   Diagnosis Date    Arthritis     total knee    Endometriosis 1/30/2009    resolved with hysterectomy    Hypertension     Ill-defined condition     IBS    Morbid obesity ([facility] [address]     Urge incontinence 08/25/2010    abdullahi michell; botox     Past Surgical History:   Procedure Laterality Date    HX APPENDECTOMY  2010    HX COLONOSCOPY  11/8/15       10 years again    HX GYN      c section times 3    HX GYN      ectopic r fallopian tube resected    HX HYSTERECTOMY  1998    HX ORTHOPAEDIC  2006    right wrist fx    HX ORTHOPAEDIC  2010    left knee replacement     HX UROLOGICAL  2014,   12/3/15    Urethral sling      Social History     Social History    Marital status:      Spouse name: N/A    Number of children: N/A    Years of education: N/A     Social History Main Topics    Smoking status: Never Smoker    Smokeless tobacco: Never Used    Alcohol use 1.0 oz/week     2 Glasses of wine per week      Comment: on saturedays    Drug use: No    Sexual activity: Yes     Partners: Male     Other Topics Concern    None     Social History Narrative    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 90 Tab 2    diphenoxylate-atropine (LOMOTIL) 2.5-0.025 mg per tablet Take 1 Tab by mouth three (3) times daily as needed for Diarrhea. Max Daily Amount: 3 Tabs. 90 Tab 0    montelukast (SINGULAIR) 10 mg tablet TAKE 1 TABLET NIGHTLY 90 Tab 1    pravastatin (PRAVACHOL) 40 mg tablet TAKE 1 TABLET NIGHTLY 90 Tab 0    valsartan (DIOVAN) 320 mg tablet Take 1 Tab by mouth daily. Indications: HYPERTENSION 90 Tab 3    FIBER, PSYLLIUM HUSK, PO Take  by mouth daily.  magnesium 250 mg tab Take 400 mg by mouth daily.  omega-3 fatty acids-vitamin e (FISH OIL) 1,000 mg Cap Take 3 capsules by mouth daily.  cholecalciferol, vitamin d3, (VITAMIN D) 1,000 unit tablet Take 2,000 Units by mouth daily.  fluticasone-salmeterol (ADVAIR) 250-50 mcg/dose diskus inhaler Take 1 Puff by inhalation two (2) times a day. MUST rinse mouth after use 2 Inhaler 3    TURMERIC ROOT EXTRACT PO Take  by mouth.  albuterol (PROVENTIL HFA, VENTOLIN HFA, PROAIR HFA) 90 mcg/actuation inhaler Take 2 Puffs by inhalation every four (4) hours as needed for Wheezing or Shortness of Breath.  1 Inhaler 6     Allergies   Allergen Reactions    Codeine Nausea and Vomiting    Erythromycin Rash    Hydrocodone Rash       Review of Systems - General ROS: negative for - chills, fatigue, fever, hot flashes, malaise, night sweats or sleep disturbance  Cardiovascular ROS: no chest pain or dyspnea on exertion  Respiratory ROS: no cough, shortness of breath, or wheezing    Visit Vitals    /82 (BP 1 Location: Left arm, BP Patient Position: Sitting)    Pulse 61    Temp 98.1 °F (36.7 °C) (Oral)    Resp 18    Ht 5' 3\" (1.6 m)    Wt 240 lb (108.9 kg)    SpO2 98%    BMI 42.51 kg/m2     General Appearance:  Well developed, well nourished,alert and oriented x 3, and individual in no acute distress. Ears/Nose/Mouth/Throat:   Hearing grossly normal.         Neck: Supple, no lad, no bruits   Chest:   Lungs clear to auscultation bilaterally. Cardiovascular:  Regular rate and rhythm, S1, S2 normal, no murmur. Abdomen:   Soft, non-tender, bowel sounds are active. Extremities: No edema bilaterally. Skin: Warm and dry, no suspicious lesions                 Adonis Snyder was seen today for hypertension. Diagnoses and all orders for this visit:    Essential hypertension  Appears to be controlled  She is eating 1/2 banana/day which is prob 30 meq potassium/day  Will check labs in mid june  -     hydroCHLOROthiazide (HYDRODIURIL) 12.5 mg tablet; Take 1 Tab by mouth daily.  -     METABOLIC PANEL, COMPREHENSIVE; Future    Irritable bowel syndrome with diarrhea  Cont meds  -     diphenoxylate-atropine (LOMOTIL) 2.5-0.025 mg per tablet; Take 1 Tab by mouth three (3) times daily as needed for Diarrhea. Max Daily Amount: 3 Tabs. Right arm pain  Thoracic radiculopathy  She does not have any motor loss strength. I think this is radiculopathy T1 distribution.  Both lyrica and gabapentin have weight gain  ini may need intervention, spine center  Advised to avoid pain meds if can help it    bmi  Cont protein  Will follow up with Dr. Jaxson Gonzalez to follow up with ultrasound for baseline and agree likely hepatic steatosis, also discussed sleep apnea risk              I spent 25 min with this patient and >50% of the time was spent on counseling and management of obesity, htn, arm pain    This note will not be viewable in MyChart.

## 2017-05-31 NOTE — MR AVS SNAPSHOT
Visit Information Date & Time Provider Department Dept. Phone Encounter #  
 5/31/2017  8:00 AM Salo Mora MD [facility] [address] [id number] Upcoming Health Maintenance Date Due FOBT Q 1 YEAR AGE 50-75 5/31/2017 INFLUENZA AGE 9 TO ADULT 8/1/2017 BREAST CANCER SCRN MAMMOGRAM 10/22/2017 PAP AKA CERVICAL CYTOLOGY 5/31/2019 DTaP/Tdap/Td series (2 - Td) 5/31/2026 Allergies as of 5/31/2017  Review Complete On: 5/31/2017 By: Salo Mora MD  
  
 Severity Noted Reaction Type Reactions Codeine  08/25/2010    Nausea and Vomiting Erythromycin  11/21/2008    Rash Hydrocodone  11/24/2015    Rash Current Immunizations  Reviewed on 5/31/2016 Name Date Tdap 5/31/2016 Zoster Vaccine, Live 3/7/2012 Not reviewed this visit You Were Diagnosed With   
  
 Codes Comments Essential hypertension    -  Primary ICD-10-CM: I10 
ICD-9-CM: 401.9 Irritable bowel syndrome with diarrhea     ICD-10-CM: K58.0 ICD-9-CM: 076.8 Right arm pain     ICD-10-CM: H35.997 ICD-9-CM: 729.5 Vitals BP Pulse Temp Resp Height(growth percentile) Weight(growth percentile) 132/82 (BP 1 Location: Left arm, BP Patient Position: Sitting) 61 98.1 °F (36.7 °C) (Oral) 18 5' 3\" (1.6 m) 240 lb (108.9 kg) SpO2 BMI OB Status Smoking Status 98% 42.51 kg/m2 Hysterectomy Never Smoker Vitals History BMI and BSA Data Body Mass Index Body Surface Area 42.51 kg/m 2 2.2 m 2 Preferred Pharmacy Pharm[facility] Name Phone [id number] [address] ([place]) [phone] Your Updated Medication List  
  
   
This list is accurate as of: 5/31/17  8:51 AM.  Always use your most recent med list.  
  
  
  
  
 albuterol 90 mcg/actuation inhaler Commonly known as:  PROVENTIL HFA, VENTOLIN HFA, PROAIR HFA  
 Take 2 Puffs by inhalation every four (4) hours as needed for Wheezing or Shortness of Breath. diphenoxylate-atropine 2.5-0.025 mg per tablet Commonly known as:  LOMOTIL Take 1 Tab by mouth three (3) times daily as needed for Diarrhea. Max Daily Amount: 3 Tabs. FIBER (PSYLLIUM HUSK) PO Take  by mouth daily. FISH OIL 1,000 mg Cap Generic drug:  omega-3 fatty acids-vitamin e Take 3 capsules by mouth daily. fluticasone-salmeterol 250-50 mcg/dose diskus inhaler Commonly known as:  ADVAIR Take 1 Puff by inhalation two (2) times a day. MUST rinse mouth after use  
  
 hydroCHLOROthiazide 12.5 mg tablet Commonly known as:  HYDRODIURIL Take 1 Tab by mouth daily. magnesium 250 mg Tab Take 400 mg by mouth daily. montelukast 10 mg tablet Commonly known as:  SINGULAIR  
TAKE 1 TABLET NIGHTLY  
  
 pravastatin 40 mg tablet Commonly known as:  PRAVACHOL  
TAKE 1 TABLET NIGHTLY RHINOCORT AQUA 32 mcg/actuation nasal spray Generic drug:  budesonide TURMERIC ROOT EXTRACT PO Take  by mouth.  
  
 valsartan 320 mg tablet Commonly known as:  DIOVAN Take 1 Tab by mouth daily. Indications: HYPERTENSION  
  
 VITAMIN D3 1,000 unit tablet Generic drug:  cholecalciferol Take 2,000 Units by mouth daily. Prescriptions Printed Refills diphenoxylate-atropine (LOMOTIL) 2.5-0.025 mg per tablet 0 Sig: Take 1 Tab by mouth three (3) times daily as needed for Diarrhea. Max Daily Amount: 3 Tabs. Class: Print Route: Oral  
  
Prescriptions Sent to Pharmacy Refills  
 hydroCHLOROthiazide (HYDRODIURIL) 12.5 mg tablet 2 Sig: Take 1 Tab by mouth daily. Class: Normal  
 Pharmacy: [id number]- [address], [address] ([place] [address]) Ph #: [phone] Route: Oral  
  
To-Do List   
 05/31/2017   Lab:  METABOLIC PANEL, COMPREHENSIVE   
  
 06/12/2017 8:00 AM  
 Appointment with Tobias Dee MD at . Ren Watson ([phone]) Introducing [facility]! Dear [facility]: Thank you for requesting a [employer] account. Our records indicate that you already have an active [employer] account. You can access your account anytime at [email or web address] Did you know that you can access your hospital and ER discharge instructions at any time in [employer]? You can also review all of your test results from your hospital stay or ER visit. Additional Information If you have questions, please visit the Frequently Asked Questions section of the [employer] website at [email or web address] Remember, [employer] is NOT to be used for urgent needs. For medical emergencies, dial 911. Now available from your iPhone and Android! Please provide this summary of care documentation to your next provider. Your primary care clinician is listed as Jennifer Andujar. If you have any questions after today's visit, please call [phone].

## 2017-06-01 NOTE — PROGRESS NOTES
You do have prediabetes. We will discuss this more when you come back. The diet and exercise we discussed will help you avoid developing diabetes.    Make sure that you get the sleep study and the ultrasound of the liver

## 2017-06-09 NOTE — PROGRESS NOTES
Patient advised of lab results. . Patient verbalized understanding and had no questions at this time.

## 2017-08-16 ENCOUNTER — TELEPHONE (OUTPATIENT)
Dept: INTERNAL MEDICINE CLINIC | Age: 62
End: 2017-08-16

## 2017-08-16 NOTE — TELEPHONE ENCOUNTER
Patients BP was 173/72 in the middle of the night. She didn't come back in for her follow up with . Patient stated that she had this problem before and had to take another BP medication. She is scheduled to see Dr. Erick Chan 8/22 at 9:30am but will like a Codenvy message or call from Dr. Erick Chan or her nurse.

## 2017-08-16 NOTE — TELEPHONE ENCOUNTER
Emmanuel Mark, thanks for the message. She needs to come in for an appt ? How about Tomorrow at 4 pm.  I did not see her labs yet so if she can do them today or tomorrow in the am maybe they will be back by 4pm for her appt. Not sure if that was an isolated reading.

## 2017-08-16 NOTE — TELEPHONE ENCOUNTER
Pt states her blood pressure is high again. She said at 3am this morning it was 170/73. She would like to speak to someone about this.  [phone]

## 2017-08-17 ENCOUNTER — OFFICE VISIT (OUTPATIENT)
Dept: INTERNAL MEDICINE CLINIC | Age: 62
End: 2017-08-17

## 2017-08-17 VITALS
DIASTOLIC BLOOD PRESSURE: 73 MMHG | WEIGHT: 247 LBS | HEIGHT: 63 IN | TEMPERATURE: 97.9 F | HEART RATE: 56 BPM | BODY MASS INDEX: 43.77 KG/M2 | SYSTOLIC BLOOD PRESSURE: 134 MMHG | OXYGEN SATURATION: 94 % | RESPIRATION RATE: 12 BRPM

## 2017-08-17 DIAGNOSIS — I10 ESSENTIAL HYPERTENSION: Primary | ICD-10-CM

## 2017-08-17 DIAGNOSIS — F32.9 REACTIVE DEPRESSION: ICD-10-CM

## 2017-08-17 RX ORDER — BUPROPION HYDROCHLORIDE 150 MG/1
150 TABLET, EXTENDED RELEASE ORAL DAILY
Qty: 30 TAB | Refills: 2 | Status: SHIPPED | OUTPATIENT
Start: 2017-08-17 | End: 2017-09-14 | Stop reason: SDUPTHER

## 2017-08-17 RX ORDER — CLONIDINE HYDROCHLORIDE 0.1 MG/1
TABLET ORAL
Refills: 1 | COMMUNITY
Start: 2017-06-16 | End: 2017-08-17 | Stop reason: SDUPTHER

## 2017-08-17 RX ORDER — CLONIDINE HYDROCHLORIDE 0.1 MG/1
TABLET ORAL
Qty: 90 TAB | Refills: 1 | Status: SHIPPED | OUTPATIENT
Start: 2017-08-17 | End: 2017-09-14 | Stop reason: SDUPTHER

## 2017-08-17 NOTE — MR AVS SNAPSHOT
Visit Information Date & Time Provider Department Dept. Phone Encounter #  
 8/17/2017  4:00 PM Delgado Zabala MD [facility] [address] [id number] Your Appointments 8/22/2017  9:30 AM  
ACUTE CARE with Delgado Zabala MD  
[facility]) Appt Note: BP check. [address] [facility] [place] [address]  
[phone]  
  
   
 [address] [facility] [address] Upcoming Health Maintenance Date Due FOBT Q 1 YEAR AGE 50-75 5/31/2017 INFLUENZA AGE 9 TO ADULT 8/1/2017 BREAST CANCER SCRN MAMMOGRAM 5/30/2019 PAP AKA CERVICAL CYTOLOGY 5/31/2019 DTaP/Tdap/Td series (2 - Td) 5/31/2026 Allergies as of 8/17/2017  Review Complete On: 8/17/2017 By: Donna Gates Severity Noted Reaction Type Reactions Codeine  08/25/2010    Nausea and Vomiting Erythromycin  11/21/2008    Rash Hydrocodone  11/24/2015    Rash Current Immunizations  Reviewed on 5/31/2016 Name Date Tdap 5/31/2016 Zoster Vaccine, Live 3/7/2012 Not reviewed this visit Vitals BP Pulse Temp Resp Height(growth percentile) Weight(growth percentile) 134/73 (BP 1 Location: Left arm, BP Patient Position: Sitting) (!) 56 97.9 °F (36.6 °C) (Oral) 12 5' 3\" (1.6 m) 247 lb (112 kg) SpO2 BMI OB Status Smoking Status 94% 43.75 kg/m2 Hysterectomy Never Smoker Vitals History BMI and BSA Data Body Mass Index Body Surface Area 43.75 kg/m 2 2.23 m 2 Preferred Pharmacy Pharmacy Name Phone [facility] [address] AT  [address] [place]  [phone] Your Updated Medication List  
  
   
This list is accurate as of: 8/17/17  4:52 PM.  Always use your most recent med list.  
  
  
  
  
 albuterol 90 mcg/actuation inhaler Commonly known as:  PROVENTIL HFA, VENTOLIN HFA, PROAIR HFA  
 Take 2 Puffs by inhalation every four (4) hours as needed for Wheezing or Shortness of Breath. buPROPion  mg SR tablet Commonly known as:  Lavella Bugler Take 1 Tab by mouth daily. cloNIDine HCl 0.1 mg tablet Commonly known as:  CATAPRES Take 1 po in the am and 2 po in the evening  
  
 diphenoxylate-atropine 2.5-0.025 mg per tablet Commonly known as:  LOMOTIL Take 1 Tab by mouth three (3) times daily as needed for Diarrhea. Max Daily Amount: 3 Tabs. FIBER (PSYLLIUM HUSK) PO Take  by mouth daily. FISH OIL 1,000 mg Cap Generic drug:  omega-3 fatty acids-vitamin e Take 3 capsules by mouth daily. fluticasone-salmeterol 250-50 mcg/dose diskus inhaler Commonly known as:  ADVAIR Take 1 Puff by inhalation two (2) times a day. MUST rinse mouth after use  
  
 hydroCHLOROthiazide 12.5 mg tablet Commonly known as:  HYDRODIURIL Take 1 Tab by mouth daily. magnesium 250 mg Tab Take 400 mg by mouth daily. montelukast 10 mg tablet Commonly known as:  SINGULAIR  
TAKE 1 TABLET NIGHTLY  
  
 pravastatin 40 mg tablet Commonly known as:  PRAVACHOL  
TAKE 1 TABLET NIGHTLY RHINOCORT AQUA 32 mcg/actuation nasal spray Generic drug:  budesonide TURMERIC ROOT EXTRACT PO Take  by mouth.  
  
 valsartan 320 mg tablet Commonly known as:  DIOVAN Take 1 Tab by mouth daily. Indications: HYPERTENSION  
  
 VITAMIN D3 1,000 unit tablet Generic drug:  cholecalciferol Take 2,000 Units by mouth daily. Prescriptions Sent to Pharmacy Refills buPROPion SR (WELLBUTRIN SR) 150 mg SR tablet 2 Sig: Take 1 Tab by mouth daily. Class: Normal  
 Pharmacy: [facility] AT [facility] Ph #: [phone] Route: Oral  
 cloNIDine HCl (CATAPRES) 0.1 mg tablet 1 Sig: Take 1 po in the am and 2 po in the evening  Class: Normal  
 Pharmacy: [facility] [place], [address]  #: [phone] Introducing [facility]! Dear [facility]: Thank you for requesting a [employer] account. Our records indicate that you already have an active [employer] account. You can access your account anytime at [email or web address] Did you know that you can access your hospital and ER discharge instructions at any time in [employer]? You can also review all of your test results from your hospital stay or ER visit. Additional Information If you have questions, please visit the Frequently Asked Questions section of the [employer] website at [email or web address] Remember, [employer] is NOT to be used for urgent needs. For medical emergencies, dial 911. Now available from your iPhone and Android! Please provide this summary of care documentation to your next provider. Your primary care clinician is listed as Michael Jose. If you have any questions after today's visit, please call [phone].

## 2017-08-17 NOTE — PROGRESS NOTES
Chief Complaint   Patient presents with    Hypertension     Subjective:   Era Xiong is a 60 y.o. female with hypertension. Hypertension ROS: taking medications as instructed, no medication side effects noted, no TIA's, no chest pain on exertion, no dyspnea on exertion, no swelling of ankles. New concerns: she checked her bp x 1 and systolic 920 at 5am before she took her clonidine. She has not checked it any other times. She has not gone for sleep apnea testing    bmi  Pt saw Dr. Myla Pedersen but will not go back  She will go to see Dr. Briana Thorne reports has been stressed over the past few months. She reports she stays at home whereas last year she was always on the go. She does report some depression.  Her MH is 5-6/10, no crying spells, not irritible but not able to motivate to comply with health care recommendations        Past Medical History:   Diagnosis Date    Arthritis     total knee    Endometriosis 1/30/2009    resolved with hysterectomy    Hypertension     Ill-defined condition     IBS    Morbid obesity ([facility] [address]     Urge incontinence 08/25/2010    abdullahi galarza; botox     Past Surgical History:   Procedure Laterality Date    HX APPENDECTOMY  2010    HX COLONOSCOPY  11/8/15       10 years again    HX GYN      c section times 3    HX GYN      ectopic r fallopian tube resected    HX HYSTERECTOMY  1998    HX ORTHOPAEDIC  2006    right wrist fx    HX ORTHOPAEDIC  2010    left knee replacement     HX UROLOGICAL  2014,   12/3/15    Urethral sling      Social History     Social History    Marital status:      Spouse name: N/A    Number of children: N/A    Years of education: N/A     Social History Main Topics    Smoking status: Never Smoker    Smokeless tobacco: Never Used    Alcohol use 1.0 oz/week     2 Glasses of wine per week      Comment: on saturedays    Drug use: No    Sexual activity: Yes     Partners: Male     Other Topics Concern    None     Social History Narrative    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 30 Tab 2    cloNIDine HCl (CATAPRES) 0.1 mg tablet Take 1 po in the am and 2 po in the evening 90 Tab 1    budesonide (RHINOCORT AQUA) 32 mcg/actuation nasal spray       hydroCHLOROthiazide (HYDRODIURIL) 12.5 mg tablet Take 1 Tab by mouth daily. 90 Tab 2    diphenoxylate-atropine (LOMOTIL) 2.5-0.025 mg per tablet Take 1 Tab by mouth three (3) times daily as needed for Diarrhea. Max Daily Amount: 3 Tabs. 90 Tab 0    montelukast (SINGULAIR) 10 mg tablet TAKE 1 TABLET NIGHTLY 90 Tab 1    pravastatin (PRAVACHOL) 40 mg tablet TAKE 1 TABLET NIGHTLY 90 Tab 0    valsartan (DIOVAN) 320 mg tablet Take 1 Tab by mouth daily. Indications: HYPERTENSION 90 Tab 3    fluticasone-salmeterol (ADVAIR) 250-50 mcg/dose diskus inhaler Take 1 Puff by inhalation two (2) times a day. MUST rinse mouth after use 2 Inhaler 3    albuterol (PROVENTIL HFA, VENTOLIN HFA, PROAIR HFA) 90 mcg/actuation inhaler Take 2 Puffs by inhalation every four (4) hours as needed for Wheezing or Shortness of Breath. 1 Inhaler 6    magnesium 250 mg tab Take 400 mg by mouth daily.  omega-3 fatty acids-vitamin e (FISH OIL) 1,000 mg Cap Take 3 capsules by mouth daily.  cholecalciferol, vitamin d3, (VITAMIN D) 1,000 unit tablet Take 2,000 Units by mouth daily.  TURMERIC ROOT EXTRACT PO Take  by mouth.  FIBER, PSYLLIUM HUSK, PO Take  by mouth daily.        Allergies   Allergen Reactions    Codeine Nausea and Vomiting    Erythromycin Rash    Hydrocodone Rash       Review of Systems - General ROS: negative for - chills, fatigue, fever, hot flashes, malaise, night sweats or sleep disturbance  Cardiovascular ROS: no chest pain or dyspnea on exertion  Respiratory ROS: no cough, shortness of breath, or wheezing    Visit Vitals    /73 (BP 1 Location: Left arm, BP Patient Position: Sitting)    Pulse (!) 56    Temp 97.9 °F (36.6 °C) (Oral)    Resp 12    Ht 5' 3\" (1.6 m)    Wt 247 lb (112 kg)    SpO2 94%    BMI 43.75 kg/m2     General Appearance:  Well developed, well nourished,alert and oriented x 3, and individual in no acute distress. Ears/Nose/Mouth/Throat:   Hearing grossly normal.         Neck: Supple, no lad, no bruits   Chest:   Lungs clear to auscultation bilaterally. Cardiovascular:  Regular rate and rhythm, S1, S2 normal, no murmur. Abdomen:   Soft, non-tender, bowel sounds are active. Extremities: No edema bilaterally. Skin: Warm and dry, no suspicious lesions                 Diagnoses and all orders for this visit:    1. Essential hypertension  Not controlled  I asked pt to get more readings before am meds if >150/90   Considered guanfacine but interaction with clondidne  Will increase to 0.1 mg in am and 1.5 mg in pm to offset high readings prior to am meds  -     cloNIDine HCl (CATAPRES) 0.1 mg tablet; Take 1 po in the am and 2 po in the evening  Follow up in 1 month  Needs to follow up with sleep study as may be hindering bp progress    2. Reactive depression  Situational stress related to job loss and moving to new house  May want sicat to add naltrexone to it  -     buPROPion SR (WELLBUTRIN SR) 150 mg SR tablet; Take 1 Tab by mouth daily.     3. BMI 40.0-44.9, adult ([address]  Will see sicat        I spent 25 min with this patient and >50% of the time was spent on counseling and management of htn with med se profile, necessity for sleep study, depression affecting bp as well and hindering weight loss as well. This note will not be viewable in [address]

## 2017-08-22 LAB
ALBUMIN SERPL-MCNC: 4.4 G/DL (ref 3.6–4.8)
ALBUMIN/GLOB SERPL: 1.9 {RATIO} (ref 1.2–2.2)
ALP SERPL-CCNC: 55 IU/L (ref 39–117)
ALT SERPL-CCNC: 40 IU/L (ref 0–32)
AST SERPL-CCNC: 28 IU/L (ref 0–40)
BILIRUB SERPL-MCNC: 0.4 MG/DL (ref 0–1.2)
BUN SERPL-MCNC: 16 MG/DL (ref 8–27)
BUN/CREAT SERPL: 19 (ref 12–28)
CALCIUM SERPL-MCNC: 10.1 MG/DL (ref 8.7–10.3)
CHLORIDE SERPL-SCNC: 103 MMOL/L (ref 96–106)
CHOLEST SERPL-MCNC: 162 MG/DL (ref 100–199)
CO2 SERPL-SCNC: 22 MMOL/L (ref 18–29)
CREAT SERPL-MCNC: 0.86 MG/DL (ref 0.57–1)
GLOBULIN SER CALC-MCNC: 2.3 G/DL (ref 1.5–4.5)
GLUCOSE SERPL-MCNC: 109 MG/DL (ref 65–99)
HDLC SERPL-MCNC: 40 MG/DL
HGB BLD-MCNC: 13.1 G/DL (ref 11.1–15.9)
INTERPRETATION, 910389: NORMAL
LDLC SERPL CALC-MCNC: 77 MG/DL (ref 0–99)
POTASSIUM SERPL-SCNC: 4.5 MMOL/L (ref 3.5–5.2)
PROT SERPL-MCNC: 6.7 G/DL (ref 6–8.5)
SODIUM SERPL-SCNC: 140 MMOL/L (ref 134–144)
T4 FREE SERPL-MCNC: 1.05 NG/DL (ref 0.82–1.77)
TRIGL SERPL-MCNC: 224 MG/DL (ref 0–149)
TSH SERPL DL<=0.005 MIU/L-ACNC: 2.71 UIU/ML (ref 0.45–4.5)
VLDLC SERPL CALC-MCNC: 45 MG/DL (ref 5–40)

## 2017-09-14 ENCOUNTER — OFFICE VISIT (OUTPATIENT)
Dept: INTERNAL MEDICINE CLINIC | Age: 62
End: 2017-09-14

## 2017-09-14 VITALS
SYSTOLIC BLOOD PRESSURE: 140 MMHG | WEIGHT: 241 LBS | OXYGEN SATURATION: 98 % | RESPIRATION RATE: 18 BRPM | BODY MASS INDEX: 42.7 KG/M2 | DIASTOLIC BLOOD PRESSURE: 61 MMHG | HEART RATE: 52 BPM | HEIGHT: 63 IN | TEMPERATURE: 98.3 F

## 2017-09-14 DIAGNOSIS — I10 ESSENTIAL HYPERTENSION: Primary | ICD-10-CM

## 2017-09-14 DIAGNOSIS — F32.9 REACTIVE DEPRESSION: ICD-10-CM

## 2017-09-14 RX ORDER — CLONIDINE HYDROCHLORIDE 0.1 MG/1
0.1 TABLET ORAL 2 TIMES DAILY
Qty: 60 TAB | Refills: 1
Start: 2017-09-14 | End: 2017-11-15 | Stop reason: SDUPTHER

## 2017-09-14 RX ORDER — BUPROPION HYDROCHLORIDE 150 MG/1
150 TABLET, EXTENDED RELEASE ORAL DAILY
Qty: 180 TAB | Refills: 2 | Status: SHIPPED | OUTPATIENT
Start: 2017-09-14 | End: 2017-11-26 | Stop reason: SDUPTHER

## 2017-09-14 RX ORDER — DILTIAZEM HYDROCHLORIDE 120 MG/1
120 CAPSULE, COATED, EXTENDED RELEASE ORAL DAILY
Qty: 30 CAP | Refills: 3 | Status: SHIPPED | OUTPATIENT
Start: 2017-09-14 | End: 2018-01-02 | Stop reason: SDUPTHER

## 2017-09-14 NOTE — PROGRESS NOTES
Chief Complaint   Patient presents with    Hypertension    Medication Evaluation     bmi 43  Pt saw Nik Obrien with Dr. Jacinda Jin. She will start metformin. Pt had good muscle mass percentage. She is exercising and walking. She is down 5 pounds but thinks it is water weight. Subjective:   Marry Foreman is a 60 y.o. female with hypertension. Hypertension ROS: taking medications as instructed, no medication side effects noted, no TIA's, no chest pain on exertion, no dyspnea on exertion, no swelling of ankles. New concerns: she went to 2 clonidine at night and 1 during the day. Situational stress  Pt reports has been stressed over the past few months. She reports she stays at home whereas last year she was always on the go. She does report some depression.  Her MH is 5-6/10, no crying spells, not irritible but not able to motivate to comply with health care recommendations        Past Medical History:   Diagnosis Date    Arthritis     total knee    Endometriosis 1/30/2009    resolved with hysterectomy    Hypertension     Ill-defined condition     IBS    Morbid obesity ([address]     Urge incontinence 08/25/2010    abdullahi galarza; botox     Past Surgical History:   Procedure Laterality Date    HX APPENDECTOMY  2010    HX COLONOSCOPY  11/8/15       10 years again    HX GYN      c section times 3    HX GYN      ectopic r fallopian tube resected    HX HYSTERECTOMY  1998    HX ORTHOPAEDIC  2006    right wrist fx    HX ORTHOPAEDIC  2010    left knee replacement     HX UROLOGICAL  2014,   12/3/15    Urethral sling      Social History     Social History    Marital status:      Spouse name: N/A    Number of children: N/A    Years of education: N/A     Social History Main Topics    Smoking status: Never Smoker    Smokeless tobacco: Never Used    Alcohol use 1.0 oz/week     2 Glasses of wine per week      Comment: on saturedays    Drug use: No    Sexual activity: Yes     Partners: Male     Other Topics Concern    None     Social History Narrative    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 90 Tab 2    buPROPion SR (WELLBUTRIN SR) 150 mg SR tablet Take 1 Tab by mouth daily. 30 Tab 2    cloNIDine HCl (CATAPRES) 0.1 mg tablet Take 1 po in the am and 2 po in the evening 90 Tab 1    budesonide (RHINOCORT AQUA) 32 mcg/actuation nasal spray       diphenoxylate-atropine (LOMOTIL) 2.5-0.025 mg per tablet Take 1 Tab by mouth three (3) times daily as needed for Diarrhea. Max Daily Amount: 3 Tabs. 90 Tab 0    montelukast (SINGULAIR) 10 mg tablet TAKE 1 TABLET NIGHTLY 90 Tab 1    pravastatin (PRAVACHOL) 40 mg tablet TAKE 1 TABLET NIGHTLY 90 Tab 0    valsartan (DIOVAN) 320 mg tablet Take 1 Tab by mouth daily. Indications: HYPERTENSION 90 Tab 3    fluticasone-salmeterol (ADVAIR) 250-50 mcg/dose diskus inhaler Take 1 Puff by inhalation two (2) times a day. MUST rinse mouth after use 2 Inhaler 3    albuterol (PROVENTIL HFA, VENTOLIN HFA, PROAIR HFA) 90 mcg/actuation inhaler Take 2 Puffs by inhalation every four (4) hours as needed for Wheezing or Shortness of Breath. 1 Inhaler 6    FIBER, PSYLLIUM HUSK, PO Take  by mouth daily.  magnesium 250 mg tab Take 400 mg by mouth daily.  omega-3 fatty acids-vitamin e (FISH OIL) 1,000 mg Cap Take 3 capsules by mouth daily.  cholecalciferol, vitamin d3, (VITAMIN D) 1,000 unit tablet Take 2,000 Units by mouth daily.  TURMERIC ROOT EXTRACT PO Take  by mouth. Allergies   Allergen Reactions    Codeine Nausea and Vomiting    Erythromycin Rash    Hydrocodone Rash       Review of Systems - General ROS: negative for - chills, fatigue, fever, hot flashes, malaise, night sweats or sleep disturbance  Cardiovascular ROS: no chest pain or dyspnea on exertion  Respiratory ROS: no cough, shortness of breath, or wheezing    Visit Vitals    /61 (BP 1 Location: Left arm, BP Patient Position: Sitting)    Pulse (!) 52    Temp 98.3 °F (36.8 °C) (Oral)    Resp 18    Ht 5' 3\" (1.6 m)    Wt 241 lb (109.3 kg)    SpO2 98%    BMI 42.69 kg/m2     General Appearance:  Well developed, well nourished,alert and oriented x 3, and individual in no acute distress. Ears/Nose/Mouth/Throat:   Hearing grossly normal.         Neck: Supple, no lad, no bruits   Chest:   Lungs clear to auscultation bilaterally. Cardiovascular:  Regular rate and rhythm, S1, S2 normal, no murmur. Abdomen:   Soft, non-tender, bowel sounds are active. Extremities: No edema bilaterally. Skin: Warm and dry, no suspicious lesions                 Diagnoses and all orders for this visit:    1. Essential hypertension  Not controlled  Will decrease clonidine to 1 tab bid and add diltizem. Cant take bblocker due to underlying lung issues  Will likely increase diltiazem and drop back on clondine for now  I think her stress level is more manageable so bp will likely stabilize  -     cloNIDine HCl (CATAPRES) 0.1 mg tablet; Take 1 Tab by mouth two (2) times a day. Take 1 po in the am and 2 po in the evening    2. Reactive depression  Agree with increase in wellbutrin to decrease cortisol levels and may improve weight loss success  May also want to add on naltrexone by Norberto Rose  -     buPROPion SR [facility] SR) 150 mg SR tablet; Take 1 Tab by mouth daily. 3. BMI 40.0-44.9, adult ([address]  [facility]    Other orders  -     dilTIAZem CD (CARDIZEM CD) 120 mg ER capsule;  Take 1 Cap by mouth daily. I spent 25 min with this patient and >50% of the time was spent on counseling and management of htn and discussed increase of clonidine vs add on diltiazem. Due to age will wean her off clonidine if tolerates the diltiazme, discussed potential add on of metformin for weight as well but will let Devon do this      This note will not be viewable in [address]

## 2017-09-14 NOTE — PATIENT INSTRUCTIONS

## 2017-09-14 NOTE — MR AVS SNAPSHOT
Visit Information Date & Time Provider Department Dept. Phone Encounter #  
 9/14/2017  8:00 AM Michael Gorman MD [facility] [address] [id number] Upcoming Health Maintenance Date Due FOBT Q 1 YEAR AGE 50-75 5/31/2017 INFLUENZA AGE 9 TO ADULT 8/1/2017 BREAST CANCER SCRN MAMMOGRAM 5/30/2019 PAP AKA CERVICAL CYTOLOGY 5/31/2019 DTaP/Tdap/Td series (2 - Td) 5/31/2026 Allergies as of 9/14/2017  Review Complete On: 9/14/2017 By: Michael Gorman MD  
  
 Severity Noted Reaction Type Reactions Codeine  08/25/2010    Nausea and Vomiting Erythromycin  11/21/2008    Rash Hydrocodone  11/24/2015    Rash Current Immunizations  Reviewed on 5/31/2016 Name Date Tdap 5/31/2016 Zoster Vaccine, Live 3/7/2012 Not reviewed this visit You Were Diagnosed With   
  
 Codes Comments Essential hypertension    -  Primary ICD-10-CM: I10 
ICD-9-CM: 401.9 Reactive depression     ICD-10-CM: F32.9 ICD-9-CM: 300.4 BMI 40.0-44.9, adult [facility])     ICD-10-CM: Z68.41 
ICD-9-CM: V85.41 Vitals BP Pulse Temp Resp Height(growth percentile) Weight(growth percentile) 140/61 (BP 1 Location: Left arm, BP Patient Position: Sitting) (!) 52 98.3 °F (36.8 °C) (Oral) 18 5' 3\" (1.6 m) 241 lb (109.3 kg) SpO2 BMI OB Status Smoking Status 98% 42.69 kg/m2 Hysterectomy Never Smoker Vitals History BMI and BSA Data Body Mass Index Body Surface Area  
 42.69 kg/m 2 2.2 m 2 Preferred Pharmacy Pharmacy Name Phone [address] [facility] [phone] Your Updated Medication List  
  
   
This list is accurate as of: 9/14/17  8:53 AM.  Always use your most recent med list.  
  
  
  
  
 albuterol 90 mcg/actuation inhaler Commonly known as:  PROVENTIL HFA, VENTOLIN HFA, PROAIR HFA  
 Take 2 Puffs by inhalation every four (4) hours as needed for Wheezing or Shortness of Breath. buPROPion  mg SR tablet Commonly known as:  Ben Keena Take 1 Tab by mouth daily. cloNIDine HCl 0.1 mg tablet Commonly known as:  CATAPRES Take 1 Tab by mouth two (2) times a day. Take 1 po in the am and 2 po in the evening  
  
 dilTIAZem  mg ER capsule Commonly known as:  CARDIZEM CD Take 1 Cap by mouth daily. diphenoxylate-atropine 2.5-0.025 mg per tablet Commonly known as:  LOMOTIL Take 1 Tab by mouth three (3) times daily as needed for Diarrhea. Max Daily Amount: 3 Tabs. FIBER (PSYLLIUM HUSK) PO Take  by mouth daily. FISH OIL 1,000 mg Cap Generic drug:  omega-3 fatty acids-vitamin e Take 3 capsules by mouth daily. fluticasone-salmeterol 250-50 mcg/dose diskus inhaler Commonly known as:  ADVAIR Take 1 Puff by inhalation two (2) times a day. MUST rinse mouth after use  
  
 hydroCHLOROthiazide 12.5 mg tablet Commonly known as:  HYDRODIURIL Take 1 Tab by mouth daily. magnesium 250 mg Tab Take 400 mg by mouth daily. montelukast 10 mg tablet Commonly known as:  SINGULAIR  
TAKE 1 TABLET NIGHTLY  
  
 pravastatin 40 mg tablet Commonly known as:  PRAVACHOL  
TAKE 1 TABLET NIGHTLY RHINOCORT AQUA 32 mcg/actuation nasal spray Generic drug:  budesonide TURMERIC ROOT EXTRACT PO Take  by mouth.  
  
 valsartan 320 mg tablet Commonly known as:  DIOVAN Take 1 Tab by mouth daily. Indications: HYPERTENSION  
  
 VITAMIN D3 1,000 unit tablet Generic drug:  cholecalciferol Take 2,000 Units by mouth daily. Prescriptions Printed Refills  
 dilTIAZem CD (CARDIZEM CD) 120 mg ER capsule 3 Sig: Take 1 Cap by mouth daily. Class: Print Route: Oral  
  
Prescriptions Sent to Pharmacy Refills buPROPion SR (WELLBUTRIN SR) 150 mg SR tablet 2 Sig: Take 1 Tab by mouth daily. Class: Normal  
 Pharmacy: [address], [address] Ph #: [phone] Route: Oral  
  
Patient Instructions High Blood Pressure: Care Instructions Your Care Instructions If your blood pressure is usually above 140/90, you have high blood pressure, or hypertension. That means the top number is 140 or higher or the bottom number is 90 or higher, or both. Despite what a lot of people think, high blood pressure usually doesn't cause headaches or make you feel dizzy or lightheaded. It usually has no symptoms. But it does increase your risk for heart attack, stroke, and kidney or eye damage. The higher your blood pressure, the more your risk increases. Your doctor will give you a goal for your blood pressure. Your goal will be based on your health and your age. An example of a goal is to keep your blood pressure below 140/90. Lifestyle changes, such as eating healthy and being active, are always important to help lower blood pressure. You might also take medicine to reach your blood pressure goal. 
Follow-up care is a key part of your treatment and safety. Be sure to make and go to all appointments, and call your doctor if you are having problems. It's also a good idea to know your test results and keep a list of the medicines you take. How can you care for yourself at home? Medical treatment · If you stop taking your medicine, your blood pressure will go back up. You may take one or more types of medicine to lower your blood pressure. Be safe with medicines. Take your medicine exactly as prescribed. Call your doctor if you think you are having a problem with your medicine. · Talk to your doctor before you start taking aspirin every day. Aspirin can help certain people lower their risk of a heart attack or stroke. But taking aspirin isn't right for everyone, because it can cause serious bleeding. · See your doctor regularly. You may need to see the doctor more often at first or until your blood pressure comes down. · If you are taking blood pressure medicine, talk to your doctor before you take decongestants or anti-inflammatory medicine, such as ibuprofen. Some of these medicines can raise blood pressure. · Learn how to check your blood pressure at home. Lifestyle changes · Stay at a healthy weight. This is especially important if you put on weight around the waist. Losing even 10 pounds can help you lower your blood pressure. · If your doctor recommends it, get more exercise. Walking is a good choice. Bit by bit, increase the amount you walk every day. Try for at least 30 minutes on most days of the week. You also may want to swim, bike, or do other activities. · Avoid or limit alcohol. Talk to your doctor about whether you can drink any alcohol. · Try to limit how much sodium you eat to less than 2,300 milligrams (mg) a day. Your doctor may ask you to try to eat less than 1,500 mg a day. · Eat plenty of fruits (such as bananas and oranges), vegetables, legumes, whole grains, and low-fat dairy products. · Lower the amount of saturated fat in your diet. Saturated fat is found in animal products such as milk, cheese, and meat. Limiting these foods may help you lose weight and also lower your risk for heart disease. · Do not smoke. Smoking increases your risk for heart attack and stroke. If you need help quitting, talk to your doctor about stop-smoking programs and medicines. These can increase your chances of quitting for good. When should you call for help? Call 911 anytime you think you may need emergency care. This may mean having symptoms that suggest that your blood pressure is causing a serious heart or blood vessel problem. Your blood pressure may be over 180/110. For example, call 911 if: 
· You have symptoms of a heart attack. These may include: ¨ Chest pain or pressure, or a strange feeling in the chest. 
¨ Sweating. ¨ Shortness of breath. ¨ Nausea or vomiting. ¨ Pain, pressure, or a strange feeling in the back, neck, jaw, or upper belly or in one or both shoulders or arms. ¨ Lightheadedness or sudden weakness. ¨ A fast or irregular heartbeat. · You have symptoms of a stroke. These may include: 
¨ Sudden numbness, tingling, weakness, or loss of movement in your face, arm, or leg, especially on only one side of your body. ¨ Sudden vision changes. ¨ Sudden trouble speaking. ¨ Sudden confusion or trouble understanding simple statements. ¨ Sudden problems with walking or balance. ¨ A sudden, severe headache that is different from past headaches. · You have severe back or belly pain. Do not wait until your blood pressure comes down on its own. Get help right away. Call your doctor now or seek immediate care if: 
· Your blood pressure is much higher than normal (such as 180/110 or higher), but you don't have symptoms. · You think high blood pressure is causing symptoms, such as: ¨ Severe headache. ¨ Blurry vision. Watch closely for changes in your health, and be sure to contact your doctor if: 
· Your blood pressure measures 140/90 or higher at least 2 times. That means the top number is 140 or higher or the bottom number is 90 or higher, or both. · You think you may be having side effects from your blood pressure medicine. · Your blood pressure is usually normal, but it goes above normal at least 2 times. Where can you learn more? Go to http://jose-darvin.info/. Enter A803 in the search box to learn more about \"High Blood Pressure: Care Instructions. \" Current as of: August 8, 2016 Content Version: 11.3 © 2841-5361 [employer]. Care instructions adapted under license by [employer] (which disclaims liability or warranty for this information).  If you have questions about a medical condition or this instruction, always ask your healthcare professional. [address] any warranty or liability for your use of this information. Introducing [facility] & HE[facility]! Dear Ariane Soares: Thank you for requesting a [employer] account. Our records indicate that you already have an active [employer] account. You can access your account anytime at [email or web address] Did you know that you can access your hospital and ER discharge instructions at any time in [employer]? You can also review all of your test results from your hospital stay or ER visit. Additional Information If you have questions, please visit the Frequently Asked Questions section of the [employer] website at [email or web address] Remember, [employer] is NOT to be used for urgent needs. For medical emergencies, dial 911. Now available from your iPhone and Android! Please provide this summary of care documentation to your next provider. Your primary care clinician is listed as August Force. If you have any questions after today's visit, please call [phone].

## 2017-11-27 ENCOUNTER — TELEPHONE (OUTPATIENT)
Dept: INTERNAL MEDICINE CLINIC | Age: 62
End: 2017-11-27

## 2017-11-27 NOTE — TELEPHONE ENCOUNTER
[facility] called and needs clarification on directions for clonidine.   [phone] ref #[id number]

## 2017-11-29 ENCOUNTER — OFFICE VISIT (OUTPATIENT)
Dept: INTERNAL MEDICINE CLINIC | Age: 62
End: 2017-11-29

## 2017-11-29 VITALS
HEART RATE: 56 BPM | RESPIRATION RATE: 12 BRPM | OXYGEN SATURATION: 96 % | WEIGHT: 229.8 LBS | HEIGHT: 63 IN | DIASTOLIC BLOOD PRESSURE: 68 MMHG | SYSTOLIC BLOOD PRESSURE: 144 MMHG | TEMPERATURE: 98 F | BODY MASS INDEX: 40.72 KG/M2

## 2017-11-29 DIAGNOSIS — Z23 IMMUNIZATION DUE: ICD-10-CM

## 2017-11-29 DIAGNOSIS — E78.2 MIXED HYPERLIPIDEMIA: ICD-10-CM

## 2017-11-29 DIAGNOSIS — J45.909 MILD REACTIVE AIRWAYS DISEASE, UNSPECIFIED WHETHER PERSISTENT: ICD-10-CM

## 2017-11-29 DIAGNOSIS — I10 ESSENTIAL HYPERTENSION: Primary | ICD-10-CM

## 2017-11-29 DIAGNOSIS — E03.9 ACQUIRED HYPOTHYROIDISM: ICD-10-CM

## 2017-11-29 DIAGNOSIS — I10 ESSENTIAL HYPERTENSION: ICD-10-CM

## 2017-11-29 PROBLEM — E66.01 OBESITY, MORBID (HCC): Status: ACTIVE | Noted: 2017-11-29

## 2017-11-29 RX ORDER — METFORMIN HYDROCHLORIDE 500 MG/1
TABLET, EXTENDED RELEASE ORAL
COMMUNITY
Start: 2017-11-25 | End: 2019-08-07 | Stop reason: ALTCHOICE

## 2017-11-29 RX ORDER — LEVOTHYROXINE SODIUM 50 UG/1
TABLET ORAL
COMMUNITY
Start: 2017-11-25 | End: 2018-09-14

## 2017-11-29 RX ORDER — CLONIDINE HYDROCHLORIDE 0.1 MG/1
0.1 TABLET ORAL 2 TIMES DAILY
Qty: 180 TAB | Refills: 3 | Status: SHIPPED | OUTPATIENT
Start: 2017-11-29 | End: 2018-02-07 | Stop reason: ALTCHOICE

## 2017-11-29 NOTE — PROGRESS NOTES
Chief Complaint   Patient presents with    Follow-up     Routine Follow up     Medication Evaluation     bmi 42  Pt saw [facility] with Dr. Elgin Ford.    + benefits with just metformin. Pt had good muscle mass percentage. She is exercising and walking. She is down 15 pounds     Subjective:   Carlos Alberto Olivia is a 60 y.o. female with hypertension. Hypertension ROS: taking medications as instructed, no medication side effects noted, no TIA's, no chest pain on exertion, no dyspnea on exertion, no swelling of ankles. New concerns: she needs refill. She is back down to clonidine 1 tab bid. She is curious why bp not coming down with decreased stress and decreased weight    Situational stress  Now much better  Pt reports has been stressed over the past few months. She is on wellbutrin 150 mg for 3 weeks. She does have stress as son will have a divorce, no kids but now living back with pt and . Cholesterol  She has been on pravastatin. She feels like her memory, short term, has been getting worse. She has been on statin for > 7 years. She reports her diet has improved. No cp no sob    RAD  Pt reports she uses albuterol when she walks the dog. She was told that the allergist told her she did not have asthma but she was not having sx at the time. She does find significant relief with advair. No wheezing but mild sob with exertion in the cold.   Needs flu shot    Past Medical History:   Diagnosis Date    Arthritis     total knee    Endometriosis 1/30/2009    resolved with hysterectomy    Hypertension     Ill-defined condition     IBS    Morbid obesity ([address]     Precancerous skin lesion     Urge incontinence 08/25/2010    abdullahi galarza; botox     Past Surgical History:   Procedure Laterality Date    HX APPENDECTOMY  2010    HX COLONOSCOPY  11/8/15       10 years again    HX GYN      c section times 3    HX GYN      ectopic r fallopian tube resected    HX HYSTERECTOMY  1998    HX ORTHOPAEDIC  2006    right wrist fx    HX ORTHOPAEDIC  2010    left knee replacement     HX ORTHOPAEDIC Right 10/19/2017    Elbow     HX UROLOGICAL  2014,   12/3/15    Urethral sling      Social History     Social History    Marital status:      Spouse name: N/A    Number of children: N/A    Years of education: N/A     Social History Main Topics    Smoking status: Never Smoker    Smokeless tobacco: Never Used    Alcohol use 1.0 oz/week     2 Glasses of wine per week      Comment: on saturedays    Drug use: No    Sexual activity: Yes     Partners: Male     Other Topics Concern    None     Social History Narrative    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 180 Tab 2    buPROPion SR (WELLBUTRIN SR) 150 mg SR tablet Take 1 Tab by mouth daily. 180 Tab 2    cloNIDine HCl (CATAPRES) 0.1 mg tablet Take 1 Tab by mouth two (2) times a day. Take 1 po in the am and 2 po in the evening 60 Tab 1    valsartan (DIOVAN) 320 mg tablet Take 1 Tab by mouth daily. Indications: hypertension 90 Tab 3    montelukast (SINGULAIR) 10 mg tablet Take 1 Tab by mouth daily. 90 Tab 1    montelukast (SINGULAIR) 10 mg tablet Take 1 Tab by mouth daily. 90 Tab 1    dilTIAZem CD (CARDIZEM CD) 120 mg ER capsule Take 1 Cap by mouth daily.  30 Cap 3    pravastatin (PRAVACHOL) 40 mg tablet TAKE 1 TABLET NIGHTLY 90 Tab 0    FIBER, PSYLLIUM HUSK, PO Take  by mouth daily.      magnesium 250 mg tab Take 400 mg by mouth daily.  omega-3 fatty acids-vitamin e (FISH OIL) 1,000 mg Cap Take 3 capsules by mouth daily.  cholecalciferol, vitamin d3, (VITAMIN D) 1,000 unit tablet Take 2,000 Units by mouth daily.  albuterol (PROVENTIL HFA, VENTOLIN HFA, PROAIR HFA) 90 mcg/actuation inhaler Take 2 Puffs by inhalation every four (4) hours as needed for Wheezing or Shortness of Breath. 1 Inhaler 6    fluticasone-salmeterol (ADVAIR) 250-50 mcg/dose diskus inhaler Take 1 Puff by inhalation two (2) times a day. MUST rinse mouth after use 2 Inhaler 3    hydroCHLOROthiazide (HYDRODIURIL) 12.5 mg tablet Take 1 Tab by mouth daily. 90 Tab 2    budesonide (RHINOCORT AQUA) 32 mcg/actuation nasal spray       diphenoxylate-atropine (LOMOTIL) 2.5-0.025 mg per tablet Take 1 Tab by mouth three (3) times daily as needed for Diarrhea. Max Daily Amount: 3 Tabs. 90 Tab 0    TURMERIC ROOT EXTRACT PO Take  by mouth. Allergies   Allergen Reactions    Codeine Nausea and Vomiting    Erythromycin Rash    Hydrocodone Rash       Review of Systems - General ROS: negative for - chills, fatigue, fever, hot flashes, malaise, night sweats or sleep disturbance  Cardiovascular ROS: no chest pain or dyspnea on exertion  Respiratory ROS: no cough, shortness of breath, or wheezing    Visit Vitals    /68 (BP 1 Location: Left arm, BP Patient Position: Sitting)    Pulse (!) 56    Temp 98 °F (36.7 °C) (Oral)    Resp 12    Ht 5' 3\" (1.6 m)    Wt 229 lb 12.8 oz (104.2 kg)    SpO2 96%    BMI 40.71 kg/m2     General Appearance:  Well developed, well nourished,alert and oriented x 3, and individual in no acute distress. Ears/Nose/Mouth/Throat:   Hearing grossly normal.         Neck: Supple, no lad, no bruits   Chest:   Lungs clear to auscultation bilaterally. Cardiovascular:  Regular rate and rhythm, S1, S2 normal, no murmur. Abdomen:   Soft, non-tender, bowel sounds are active. Extremities: No edema bilaterally. Skin: Warm and dry, no suspicious lesions                 Diagnoses and all orders for this visit:    1. BMI 40.0-44.9, adult ([facility] [address]  Cont current plan with Nadia  -     HEMOGLOBIN A1C WITH EAG; Future    2. Essential hypertension  Cont meds  Monitor bp with weight loss   -     cloNIDine HCl (CATAPRES) 0.1 mg tablet; Take 1 Tab by mouth two (2) times a day. -     LIPID PANEL; Future    3. Acquired hypothyroidism  Recheck labs in jan  -     TSH 3RD GENERATION; Future    4. Mixed hyperlipidemia  -     LIPID PANEL; Future  -     METABOLIC PANEL, COMPREHENSIVE; Future    5. Mild reactive airways disease, unspecified whether persistent  Will take albuterol 30 min before walkign dog   If doing consistently will use advair  Follow up use at next vist    6. Immunization due  -     INFLUENZA VIRUS VACCINE QUADRIVALENT, PRESERVATIVE FREE SYRINGE (38104)        This note will not be viewable in [employer].

## 2017-11-29 NOTE — MR AVS SNAPSHOT
Visit Information Date & Time Provider Department Dept. Phone Encounter #  
 11/29/2017  9:40 AM Gissell Brantley MD [facility] [phone] [id number] Upcoming Health Maintenance Date Due FOBT Q 1 YEAR AGE 50-75 5/31/2017 Influenza Age 5 to Adult 8/1/2017 BREAST CANCER SCRN MAMMOGRAM 5/30/2019 PAP AKA CERVICAL CYTOLOGY 5/31/2019 DTaP/Tdap/Td series (2 - Td) 5/31/2026 Allergies as of 11/29/2017  Review Complete On: 11/29/2017 By: Gissell Brantley MD  
  
 Severity Noted Reaction Type Reactions Codeine  08/25/2010    Nausea and Vomiting Erythromycin  11/21/2008    Rash Hydrocodone  11/24/2015    Rash Current Immunizations  Reviewed on 5/31/2016 Name Date Influenza Vaccine (Quad) PF  Incomplete Tdap 5/31/2016 Zoster Vaccine, Live 3/7/2012 Not reviewed this visit You Were Diagnosed With   
  
 Codes Comments BMI 40.0-44.9, adult [facility])    -  Primary ICD-10-CM: Z68.41 
ICD-9-CM: V85.41 Essential hypertension     ICD-10-CM: I10 
ICD-9-CM: 401.9 Acquired hypothyroidism     ICD-10-CM: E03.9 ICD-9-CM: 244.9 Mixed hyperlipidemia     ICD-10-CM: E78.2 ICD-9-CM: 272.2 Mild reactive airways disease, unspecified whether persistent     ICD-10-CM: J45.909 ICD-9-CM: 493.90 Immunization due     ICD-10-CM: Z23 ICD-9-CM: V05.9 Vitals BP Pulse Temp Resp Height(growth percentile) Weight(growth percentile) 144/68 (BP 1 Location: Left arm, BP Patient Position: Sitting) (!) 56 98 °F (36.7 °C) (Oral) 12 5' 3\" (1.6 m) 229 lb 12.8 oz (104.2 kg) SpO2 BMI OB Status Smoking Status 96% 40.71 kg/m2 Hysterectomy Never Smoker BMI and BSA Data Body Mass Index Body Surface Area 40.71 kg/m 2 2.15 m 2 Preferred Pharmacy Pharmacy Name Phone [facility] [address] AT  [address]  [phone] Your Updated Medication List  
  
   
This list is accurate as of: 11/29/17 10:05 AM.  Always use your most recent med list.  
  
  
  
  
 albuterol 90 mcg/actuation inhaler Commonly known as:  PROVENTIL HFA, VENTOLIN HFA, PROAIR HFA Take 2 Puffs by inhalation every four (4) hours as needed for Wheezing or Shortness of Breath. * buPROPion  mg SR tablet Commonly known as:  [place] Take 1 Tab by mouth daily. * buPROPion  mg SR tablet Commonly known as:  [place] Take 1 Tab by mouth daily. cloNIDine HCl 0.1 mg tablet Commonly known as:  CATAPRES Take 1 Tab by mouth two (2) times a day. dilTIAZem  mg ER capsule Commonly known as:  CARDIZEM CD Take 1 Cap by mouth daily. diphenoxylate-atropine 2.5-0.025 mg per tablet Commonly known as:  LOMOTIL Take 1 Tab by mouth three (3) times daily as needed for Diarrhea. Max Daily Amount: 3 Tabs. FIBER (PSYLLIUM HUSK) PO Take  by mouth daily. FISH OIL 1,000 mg Cap Generic drug:  omega-3 fatty acids-vitamin e Take 3 capsules by mouth daily. fluticasone-salmeterol 250-50 mcg/dose diskus inhaler Commonly known as:  ADVAIR Take 1 Puff by inhalation two (2) times a day. MUST rinse mouth after use  
  
 hydroCHLOROthiazide 12.5 mg tablet Commonly known as:  HYDRODIURIL Take 1 Tab by mouth daily. levothyroxine 50 mcg tablet Commonly known as:  SYNTHROID  
  
 magnesium 250 mg Tab Take 400 mg by mouth daily. metFORMIN  mg tablet Commonly known as:  GLUCOPHAGE XR  
  
 montelukast 10 mg tablet Commonly known as:  SINGULAIR Take 1 Tab by mouth daily. RHINOCORT AQUA 32 mcg/actuation nasal spray Generic drug:  budesonide TURMERIC ROOT EXTRACT PO Take  by mouth.  
  
 valsartan 320 mg tablet Commonly known as:  DIOVAN Take 1 Tab by mouth daily. Indications: hypertension VITAMIN D3 1,000 unit tablet Generic drug:  cholecalciferol Take 2,000 Units by mouth daily. * Notice: This list has 2 medication(s) that are the same as other medications prescribed for you. Read the directions carefully, and ask your doctor or other care provider to review them with you. Prescriptions Sent to Pharmacy Refills  
 cloNIDine HCl (CATAPRES) 0.1 mg tablet 3 Sig: Take 1 Tab by mouth two (2) times a day. Class: Normal  
 Pharmacy: [facility] AT [place] [address] Ph #: [phone] Route: Oral  
  
We Performed the Following INFLUENZA VIRUS VAC QUAD,SPLIT,PRESV FREE SYRINGE IM [id number] CPT(R)] To-Do List   
 11/29/2017 Lab:  HEMOGLOBIN A1C WITH EAG   
  
 11/29/2017 Lab:  LIPID PANEL   
  
 11/29/2017 Lab:  METABOLIC PANEL, COMPREHENSIVE   
  
 11/29/2017 Lab:  TSH 3RD GENERATION Introducing [facility] SERVICES! Dear Ar Hand: Thank you for requesting a [employer] account. Our records indicate that you already have an active [employer] account. You can access your account anytime at [email or web address] Did you know that you can access your hospital and ER discharge instructions at any time in daPulse? You can also review all of your test results from your hospital stay or ER visit. Additional Information If you have questions, please visit the Frequently Asked Questions section of the daPulse website at [email or web address] Remember, daPulse is NOT to be used for urgent needs. For medical emergencies, dial 911. Now available from your iPhone and Android! Please provide this summary of care documentation to your next provider. Your primary care clinician is listed as [facility]. If you have any questions after today's visit, please call [phone].

## 2018-01-09 LAB
ALBUMIN SERPL-MCNC: 4.5 G/DL (ref 3.6–4.8)
ALBUMIN/GLOB SERPL: 2 {RATIO} (ref 1.2–2.2)
ALP SERPL-CCNC: 51 IU/L (ref 39–117)
ALT SERPL-CCNC: 30 IU/L (ref 0–32)
AST SERPL-CCNC: 18 IU/L (ref 0–40)
BILIRUB SERPL-MCNC: 0.6 MG/DL (ref 0–1.2)
BUN SERPL-MCNC: 19 MG/DL (ref 8–27)
BUN/CREAT SERPL: 22 (ref 12–28)
CALCIUM SERPL-MCNC: 10.4 MG/DL (ref 8.7–10.3)
CHLORIDE SERPL-SCNC: 103 MMOL/L (ref 96–106)
CHOLEST SERPL-MCNC: 201 MG/DL (ref 100–199)
CO2 SERPL-SCNC: 23 MMOL/L (ref 18–29)
CREAT SERPL-MCNC: 0.87 MG/DL (ref 0.57–1)
EST. AVERAGE GLUCOSE BLD GHB EST-MCNC: 111 MG/DL
GLOBULIN SER CALC-MCNC: 2.2 G/DL (ref 1.5–4.5)
GLUCOSE SERPL-MCNC: 101 MG/DL (ref 65–99)
HBA1C MFR BLD: 5.5 % (ref 4.8–5.6)
HDLC SERPL-MCNC: 43 MG/DL
INTERPRETATION, 910389: NORMAL
LDLC SERPL CALC-MCNC: 135 MG/DL (ref 0–99)
POTASSIUM SERPL-SCNC: 5 MMOL/L (ref 3.5–5.2)
PROT SERPL-MCNC: 6.7 G/DL (ref 6–8.5)
SODIUM SERPL-SCNC: 140 MMOL/L (ref 134–144)
TRIGL SERPL-MCNC: 115 MG/DL (ref 0–149)
TSH SERPL DL<=0.005 MIU/L-ACNC: 2.24 UIU/ML (ref 0.45–4.5)
VLDLC SERPL CALC-MCNC: 23 MG/DL (ref 5–40)

## 2018-02-07 DIAGNOSIS — I10 ESSENTIAL HYPERTENSION: ICD-10-CM

## 2018-02-07 RX ORDER — CLONIDINE HYDROCHLORIDE 0.1 MG/1
TABLET ORAL
Qty: 180 TAB | Refills: 0 | Status: SHIPPED | OUTPATIENT
Start: 2018-02-07 | End: 2018-03-15 | Stop reason: ALTCHOICE

## 2018-03-15 ENCOUNTER — OFFICE VISIT (OUTPATIENT)
Dept: INTERNAL MEDICINE CLINIC | Age: 63
End: 2018-03-15

## 2018-03-15 VITALS
SYSTOLIC BLOOD PRESSURE: 107 MMHG | DIASTOLIC BLOOD PRESSURE: 62 MMHG | RESPIRATION RATE: 18 BRPM | OXYGEN SATURATION: 96 % | HEART RATE: 47 BPM | BODY MASS INDEX: 39.69 KG/M2 | TEMPERATURE: 97.7 F | HEIGHT: 63 IN | WEIGHT: 224 LBS

## 2018-03-15 DIAGNOSIS — I10 ESSENTIAL HYPERTENSION: Primary | ICD-10-CM

## 2018-03-15 DIAGNOSIS — E78.2 MIXED HYPERLIPIDEMIA: ICD-10-CM

## 2018-03-15 RX ORDER — CEPHALEXIN 250 MG/1
CAPSULE ORAL
COMMUNITY
Start: 2018-01-07 | End: 2018-09-14 | Stop reason: ALTCHOICE

## 2018-03-15 RX ORDER — CLONIDINE HYDROCHLORIDE 0.1 MG/1
0.1 TABLET ORAL 2 TIMES DAILY
Qty: 60 TAB | Refills: 3 | Status: SHIPPED | OUTPATIENT
Start: 2018-03-15 | End: 2018-05-08 | Stop reason: SDUPTHER

## 2018-03-15 NOTE — MR AVS SNAPSHOT
[facility] 
 
 
 [address] 
[phone] Patient: Opal Speaker MRN: [id number] Visit Information Date & Time Provider Department Dept. Phone Encounter #  
 3/15/2018  9:40 AM Juany Soriano MD [facility] [address] [id number] Upcoming Health Maintenance Date Due FOBT Q 1 YEAR AGE 50-75 5/31/2017 BREAST CANCER SCRN MAMMOGRAM 5/30/2019 PAP AKA CERVICAL CYTOLOGY 5/31/2019 DTaP/Tdap/Td series (2 - Td) 5/31/2026 Allergies as of 3/15/2018  Review Complete On: 11/29/2017 By: Juany Soriano MD  
  
 Severity Noted Reaction Type Reactions Codeine  08/25/2010    Nausea and Vomiting Erythromycin  11/21/2008    Rash Hydrocodone  11/24/2015    Rash Current Immunizations  Reviewed on 5/31/2016 Name Date Influenza Vaccine (Quad) PF 11/29/2017 Tdap 5/31/2016 Zoster Vaccine, Live 3/7/2012 Not reviewed this visit You Were Diagnosed With   
  
 Codes Comments Essential hypertension    -  Primary ICD-10-CM: I10 
ICD-9-CM: 401.9 Mixed hyperlipidemia     ICD-10-CM: E78.2 ICD-9-CM: 272.2 Vitals BP Pulse Temp Resp Height(growth percentile) Weight(growth percentile) 107/62 (BP 1 Location: Left arm, BP Patient Position: Sitting) (!) 47 97.7 °F (36.5 °C) (Oral) 18 5' 3\" (1.6 m) 224 lb (101.6 kg) SpO2 BMI OB Status Smoking Status 96% 39.68 kg/m2 Hysterectomy Never Smoker Vitals History BMI and BSA Data Body Mass Index Body Surface Area  
 39.68 kg/m 2 2.13 m 2 Preferred Pharmacy Pharmacy Name Phone [facility] [address] [phone] Your Updated Medication List  
  
   
This list is accurate as of 3/15/18 10:16 AM.  Always use your most recent med list.  
  
  
  
  
 buPROPion  mg SR tablet Commonly known as:  [place] Cora Take 1 Tab by mouth daily. cephALEXin 250 mg capsule Commonly known as:  KEFLEX  
  
 cloNIDine HCl 0.1 mg tablet Commonly known as:  CATAPRES Take 1 Tab by mouth two (2) times a day. diphenoxylate-atropine 2.5-0.025 mg per tablet Commonly known as:  LOMOTIL Take 1 Tab by mouth three (3) times daily as needed for Diarrhea. Max Daily Amount: 3 Tabs. FISH OIL 1,000 mg Cap Generic drug:  omega-3 fatty acids-vitamin e Take 3 capsules by mouth daily. fluticasone-salmeterol 250-50 mcg/dose diskus inhaler Commonly known as:  ADVAIR Take 1 Puff by inhalation two (2) times a day. MUST rinse mouth after use  
  
 levothyroxine 50 mcg tablet Commonly known as:  SYNTHROID  
  
 magnesium 250 mg Tab Take 400 mg by mouth daily. metFORMIN  mg tablet Commonly known as:  GLUCOPHAGE XR  
  
 montelukast 10 mg tablet Commonly known as:  SINGULAIR Take 1 Tab by mouth daily. RHINOCORT AQUA 32 mcg/actuation nasal spray Generic drug:  budesonide  
  
 valsartan 320 mg tablet Commonly known as:  DIOVAN Take 1 Tab by mouth daily. Indications: hypertension VITAMIN D3 1,000 unit tablet Generic drug:  cholecalciferol Take 2,000 Units by mouth daily. Prescriptions Sent to Pharmacy Refills  
 cloNIDine HCl (CATAPRES) 0.1 mg tablet 3 Sig: Take 1 Tab by mouth two (2) times a day. Class: Normal  
 Pharmacy: [facility] AT [place] [address] Ph #: [phone] Route: Oral  
  
Introducing [facility]! Dear Josue Rouse: Thank you for requesting a [employer] account. Our records indicate that you already have an active [employer] account. You can access your account anytime at [email or web address] Did you know that you can access your hospital and ER discharge instructions at any time in [employer]? You can also review all of your test results from your hospital stay or ER visit. Additional Information If you have questions, please visit the Frequently Asked Questions section of the [employer] website at [email or web address] Remember, [employer] is NOT to be used for urgent needs. For medical emergencies, dial 911. Now available from your iPhone and Android! Please provide this summary of care documentation to your next provider. Your primary care clinician is listed as Cherry Tariq. If you have any questions after today's visit, please call [phone].

## 2018-03-15 NOTE — PROGRESS NOTES
Chief Complaint   Patient presents with    Medication Evaluation    Abnormal Lab Results     bmi 42  Pt saw [facility] with Dr. Marco Garner.    + benefits with just metformin. Pt had good muscle mass percentage. She is exercising and walking. She lost 12% of her body weight, HDL has improved. She is walking more. She is off statin no different but noted on Erins labs her chol was elevated. Subjective:   Allyson Teran is a 60 y.o. female with hypertension. Hypertension ROS: taking medications as instructed, no medication side effects noted, no TIA's, no chest pain on exertion, no dyspnea on exertion, no swelling of ankles. New concerns: she needs refill. She is back down to clonidine 1 tab bid. She gets occasional edema    Situational stress  Now much better  Doing well with new home and situation      Cholesterol  Had labs with STEPHANIE  Pulled from [facility] - discuss    RAD  Pt reports she uses albuterol when she walks the dog. She was told that the allergist told her she did not have asthma but she was not having sx at the time. She does find significant relief with advair. No wheezing but mild sob with exertion in the cold. She was never tested when she was flaring.       Past Medical History:   Diagnosis Date    Arthritis     total knee    Endometriosis 1/30/2009    resolved with hysterectomy    Hypertension     Ill-defined condition     IBS    Morbid obesity ([address]     Precancerous skin lesion     Urge incontinence 08/25/2010    abdullahi galarza; botox     Past Surgical History:   Procedure Laterality Date    HX APPENDECTOMY  2010    HX COLONOSCOPY  11/8/15       10 years again    HX GYN      c section times 3    HX GYN      ectopic r fallopian tube resected    HX HYSTERECTOMY  1998    HX ORTHOPAEDIC  2006    right wrist fx    HX ORTHOPAEDIC  2010    left knee replacement     HX ORTHOPAEDIC Right 10/19/2017    Elbow     HX UROLOGICAL  2014,   12/3/15    Urethral sling      Social History     Social History    Marital status:      Spouse name: N/A    Number of children: N/A    Years of education: N/A     Social History Main Topics    Smoking status: Never Smoker    Smokeless tobacco: Never Used    Alcohol use 1.0 oz/week     2 Glasses of wine per week      Comment: on saturedays    Drug use: No    Sexual activity: Yes     Partners: Male     Other Topics Concern    None     Social History Narrative    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 90 Cap 1    metFORMIN ER (GLUCOPHAGE XR) 500 mg tablet       levothyroxine (SYNTHROID) 50 mcg tablet       valsartan (DIOVAN) 320 mg tablet Take 1 Tab by mouth daily. Indications: hypertension 90 Tab 3    montelukast (SINGULAIR) 10 mg tablet Take 1 Tab by mouth daily. 90 Tab 1    budesonide (RHINOCORT AQUA) 32 mcg/actuation nasal spray       magnesium 250 mg tab Take 400 mg by mouth daily.  omega-3 fatty acids-vitamin e (FISH OIL) 1,000 mg Cap Take 3 capsules by mouth daily.  cholecalciferol, vitamin d3, (VITAMIN D) 1,000 unit tablet Take 2,000 Units by mouth daily.  buPROPion SR (WELLBUTRIN SR) 150 mg SR tablet Take 1 Tab by mouth daily. 180 Tab 2    buPROPion SR (WELLBUTRIN SR) 150 mg SR tablet Take 1 Tab by mouth daily.  180 Tab 2    albuterol (PROVENTIL HFA, VENTOLIN HFA, PROAIR HFA) 90 mcg/actuation inhaler Take 2 Puffs by inhalation every four (4) hours as needed for Wheezing or Shortness of Breath. 1 Inhaler 6    fluticasone-salmeterol (ADVAIR) 250-50 mcg/dose diskus inhaler Take 1 Puff by inhalation two (2) times a day. MUST rinse mouth after use 2 Inhaler 3    hydroCHLOROthiazide (HYDRODIURIL) 12.5 mg tablet Take 1 Tab by mouth daily. 90 Tab 2    diphenoxylate-atropine (LOMOTIL) 2.5-0.025 mg per tablet Take 1 Tab by mouth three (3) times daily as needed for Diarrhea. Max Daily Amount: 3 Tabs. 90 Tab 0    TURMERIC ROOT EXTRACT PO Take  by mouth.  FIBER, PSYLLIUM HUSK, PO Take  by mouth daily. Allergies   Allergen Reactions    Codeine Nausea and Vomiting    Erythromycin Rash    Hydrocodone Rash       Review of Systems - General ROS: negative for - chills, fatigue, fever, hot flashes, malaise, night sweats or sleep disturbance  Cardiovascular ROS: no chest pain or dyspnea on exertion  Respiratory ROS: no cough, shortness of breath, or wheezing    Visit Vitals    /61 (BP 1 Location: Left arm, BP Patient Position: Sitting)    Pulse (!) 47    Temp 97.7 °F (36.5 °C) (Oral)    Resp 18    Ht 5' 3\" (1.6 m)    Wt 224 lb (101.6 kg)    SpO2 96%    BMI 39.68 kg/m2     General Appearance:  Well developed, well nourished,alert and oriented x 3, and individual in no acute distress. Ears/Nose/Mouth/Throat:   Hearing grossly normal.         Neck: Supple, no lad, no bruits   Chest:   Lungs clear to auscultation bilaterally. Cardiovascular:  Regular rate and rhythm, S1, S2 normal, no murmur. Abdomen:   Soft, non-tender, bowel sounds are active. Extremities: No edema bilaterally. Skin: Warm and dry, no suspicious lesions                 Diagnoses and all orders for this visit:    1. Essential hypertension  Well controlled  Noting bradycardia on exam will d/c diltaizem  Recheck bp and HR  Cont arb and clonidine      2.  Mixed hyperlipidemia  [facility]   4.8% for 10 year CV risk score  Will stay off statin for now      bmi  38   Doing well just on metformin  Will likely add on saxenda  Provided encouragement appears to be in a good place from stress and sleep perspcective    This note will not be viewable in [address]

## 2018-08-01 ENCOUNTER — TELEPHONE (OUTPATIENT)
Dept: INTERNAL MEDICINE CLINIC | Age: 63
End: 2018-08-01

## 2018-08-01 RX ORDER — IRBESARTAN 300 MG/1
300 TABLET ORAL DAILY
Qty: 30 TAB | Refills: 4 | Status: SHIPPED | OUTPATIENT
Start: 2018-08-01 | End: 2018-08-08 | Stop reason: SDUPTHER

## 2018-08-01 NOTE — TELEPHONE ENCOUNTER
[facility] is requesting to speak with the nurse to discuss recall on valsartan , states they received a refill but will not be filling medication. [facility] hung up while holding for nurse , was not able to obtain best call back number.

## 2018-08-08 RX ORDER — IRBESARTAN 300 MG/1
300 TABLET ORAL DAILY
Qty: 90 TAB | Refills: 0 | Status: SHIPPED | OUTPATIENT
Start: 2018-08-08 | End: 2018-10-22 | Stop reason: SDUPTHER

## 2018-09-06 DIAGNOSIS — J98.01 BRONCHOSPASM: ICD-10-CM

## 2018-09-06 RX ORDER — MONTELUKAST SODIUM 10 MG/1
TABLET ORAL
Qty: 90 TAB | Refills: 1 | Status: SHIPPED | OUTPATIENT
Start: 2018-09-06 | End: 2019-03-05 | Stop reason: SDUPTHER

## 2018-09-14 ENCOUNTER — OFFICE VISIT (OUTPATIENT)
Dept: INTERNAL MEDICINE CLINIC | Age: 63
End: 2018-09-14

## 2018-09-14 VITALS
RESPIRATION RATE: 18 BRPM | OXYGEN SATURATION: 96 % | TEMPERATURE: 98.7 F | HEIGHT: 63 IN | HEART RATE: 100 BPM | WEIGHT: 199 LBS | DIASTOLIC BLOOD PRESSURE: 77 MMHG | SYSTOLIC BLOOD PRESSURE: 136 MMHG | BODY MASS INDEX: 35.26 KG/M2

## 2018-09-14 DIAGNOSIS — K64.9 HEMORRHOIDS, UNSPECIFIED HEMORRHOID TYPE: ICD-10-CM

## 2018-09-14 DIAGNOSIS — K59.03 DRUG-INDUCED CONSTIPATION: ICD-10-CM

## 2018-09-14 DIAGNOSIS — I10 ESSENTIAL HYPERTENSION: Primary | ICD-10-CM

## 2018-09-14 DIAGNOSIS — R05.9 COUGH: ICD-10-CM

## 2018-09-14 RX ORDER — LIRAGLUTIDE 6 MG/ML
INJECTION, SOLUTION SUBCUTANEOUS
COMMUNITY
Start: 2018-08-15 | End: 2019-08-07 | Stop reason: ALTCHOICE

## 2018-09-14 RX ORDER — THYROID, PORCINE 30 MG/1
TABLET ORAL
COMMUNITY
Start: 2018-09-11 | End: 2019-08-07 | Stop reason: ALTCHOICE

## 2018-09-14 RX ORDER — HYDROCORTISONE ACETATE, PRAMOXINE HCL 2.5; 1 G/100G; G/100G
CREAM TOPICAL 3 TIMES DAILY
Qty: 10 G | Refills: 0 | Status: SHIPPED | OUTPATIENT
Start: 2018-09-14 | End: 2018-09-19 | Stop reason: SDUPTHER

## 2018-09-14 RX ORDER — CEPHALEXIN 500 MG/1
CAPSULE ORAL
COMMUNITY
Start: 2018-09-05 | End: 2018-09-14 | Stop reason: ALTCHOICE

## 2018-09-14 RX ORDER — DIAZEPAM 2 MG/1
TABLET ORAL
COMMUNITY
Start: 2018-06-25 | End: 2020-04-08 | Stop reason: ALTCHOICE

## 2018-09-14 RX ORDER — PEN NEEDLE, DIABETIC 32GX 5/32"
NEEDLE, DISPOSABLE MISCELLANEOUS
COMMUNITY
Start: 2018-08-15 | End: 2019-08-07 | Stop reason: ALTCHOICE

## 2018-09-14 RX ORDER — ESTRADIOL 0.1 MG/G
CREAM VAGINAL
COMMUNITY
Start: 2018-07-03 | End: 2020-04-08 | Stop reason: ALTCHOICE

## 2018-09-14 NOTE — MR AVS SNAPSHOT
Michael Burger 
 
 
 [address] [facility] 1007 [facility] 
[phone] Patient: Homer Olivia MRN: [id number] HOC:1/04/[birth year]63 Visit Information Date & Time Provider Department Dept. Phone Encounter #  
 9/14/2018  8:20 AM Estefany Nath MD [facility] [address] [id number] Upcoming Health Maintenance Date Due FOBT Q 1 YEAR AGE 50-75 5/31/2017 Influenza Age 5 to Adult 8/1/2018 BREAST CANCER SCRN MAMMOGRAM 5/30/2019 PAP AKA CERVICAL CYTOLOGY 5/31/2019 DTaP/Tdap/Td series (2 - Td) 5/31/2026 Allergies as of 9/14/2018  Review Complete On: 9/14/2018 By: Estefany Nath MD  
  
 Severity Noted Reaction Type Reactions Codeine  08/25/2010    Nausea and Vomiting Erythromycin  11/21/2008    Rash Hydrocodone  11/24/2015    Rash Current Immunizations  Reviewed on 5/31/2016 Name Date Influenza Vaccine (Quad) PF 11/29/2017 Tdap 5/31/2016 Zoster Vaccine, Live 3/7/2012 Not reviewed this visit You Were Diagnosed With   
  
 Codes Comments Essential hypertension    -  Primary ICD-10-CM: I10 
ICD-9-CM: 401.9 Cough     ICD-10-CM: R05 ICD-9-CM: 786.2 Hemorrhoids, unspecified hemorrhoid type     ICD-10-CM: K64.9 ICD-9-CM: 455.6 Drug-induced constipation     ICD-10-CM: K59.03 
ICD-9-CM: 564.09, E980.5 Vitals BP Pulse Temp Resp Height(growth percentile) Weight(growth percentile) 136/77 (BP 1 Location: Left arm, BP Patient Position: Sitting) 100 98.7 °F (37.1 °C) (Oral) 18 5' 3\" (1.6 m) 199 lb (90.3 kg) SpO2 BMI OB Status Smoking Status 96% 35.25 kg/m2 Hysterectomy Never Smoker Vitals History BMI and BSA Data Body Mass Index Body Surface Area  
 35.25 kg/m 2 2 m 2 Preferred Pharmacy Pharmacy Name Phone Sherita Garcia, [facility] [phone] Your Updated Medication List  
  
   
This list is accurate as of 9/14/18  8:58 AM.  Always use your most recent med list.  
  
  
  
  
 ARMOUR THYROID 30 mg tablet Generic drug:  thyroid (Pork) buPROPion  mg SR tablet Commonly known as:  [place] Take 1 Tab by mouth daily. cloNIDine HCl 0.1 mg tablet Commonly known as:  CATAPRES  
TAKE 1 TABLET TWICE A DAY  
  
 diazePAM 2 mg tablet Commonly known as:  VALIUM  
  
 diphenoxylate-atropine 2.5-0.025 mg per tablet Commonly known as:  LOMOTIL Take 1 Tab by mouth three (3) times daily as needed for Diarrhea. Max Daily Amount: 3 Tabs. ESTRACE 0.01 % (0.1 mg/gram) vaginal cream  
Generic drug:  estradiol FISH OIL 1,000 mg Cap Generic drug:  omega-3 fatty acids-vitamin e Take 3 capsules by mouth daily. fluticasone-salmeterol 250-50 mcg/dose diskus inhaler Commonly known as:  ADVAIR Take 1 Puff by inhalation two (2) times a day. MUST rinse mouth after use  
  
 hydroCHLOROthiazide 12.5 mg tablet Commonly known as:  HYDRODIURIL  
TAKE 1 TABLET DAILY  
  
 irbesartan 300 mg tablet Commonly known as:  AVAPRO Take 1 Tab by mouth daily for 90 days. magnesium 250 mg Tab Take 400 mg by mouth daily. metFORMIN  mg tablet Commonly known as:  GLUCOPHAGE XR  
  
 montelukast 10 mg tablet Commonly known as:  SINGULAIR  
TAKE 1 TABLET DAILY Michelle Pen Needle 32 gauge x 5/32\" Ndle Generic drug:  Insulin Needles (Disposable) pramoxine-hydrocortisone 2.5-1 % topical cream  
Commonly known as:  PROTOFOAM-HC Apply  to affected area three (3) times daily. RHINOCORT AQUA 32 mcg/actuation nasal spray Generic drug:  budesonide SAXENDA 3 mg/0.5 mL (18 mg/3 mL) pen Generic drug:  liraglutide VITAMIN D3 1,000 unit tablet Generic drug:  cholecalciferol Take 2,000 Units by mouth daily. Prescriptions Sent to Pharmacy Refills pramoxine-hydrocortisone (PROTOFOAM-HC) 2.5-1 % topical cream 0 Sig: Apply  to affected area three (3) times daily. Class: Normal  
 Pharmacy: [address], [address] [place] [address] #: [phone] Route: Topical  
  
Patient Instructions Hemorrhoids: Care Instructions Your Care Instructions Hemorrhoids are enlarged veins that develop in the anal canal. Bleeding during bowel movements, itching, swelling, and rectal pain are the most common symptoms. They can be uncomfortable at times, but hemorrhoids rarely are a serious problem. You can treat most hemorrhoids with simple changes to your diet and bowel habits. These changes include eating more fiber and not straining to pass stools. Most hemorrhoids do not need surgery or other treatment unless they are very large and painful or bleed a lot. Follow-up care is a key part of your treatment and safety. Be sure to make and go to all appointments, and call your doctor if you are having problems. It's also a good idea to know your test results and keep a list of the medicines you take. How can you care for yourself at home? · Sit in a few inches of warm water (sitz bath) 3 times a day and after bowel movements. The warm water helps with pain and itching. · Put ice on your anal area several times a day for 10 minutes at a time. Put a thin cloth between the ice and your skin. Follow this by placing a warm, wet towel on the area for another 10 to 20 minutes. · Take pain medicines exactly as directed. ¨ If the doctor gave you a prescription medicine for pain, take it as prescribed. ¨ If you are not taking a prescription pain medicine, ask your doctor if you can take an over-the-counter medicine. · Keep the anal area clean, but be gentle. Use water and a fragrance-free soap, such as [place], or use baby wipes or medicated pads, such as Tucks.  
· Wear cotton underwear and loose clothing to decrease moisture in the anal area. · Eat more fiber. Include foods such as whole-grain breads and cereals, raw vegetables, raw and dried fruits, and beans. · Drink plenty of fluids, enough so that your urine is light yellow or clear like water. If you have kidney, heart, or liver disease and have to limit fluids, talk with your doctor before you increase the amount of fluids you drink. · Use a stool softener that contains bran or psyllium. You can save money by buying bran or psyllium (available in bulk at most health food stores) and sprinkling it on foods or stirring it into fruit juice. Or you can use a product such as Metamucil or Hydrocil. · Practice healthy bowel habits. ¨ Go to the bathroom as soon as you have the urge. ¨ Avoid straining to pass stools. Relax and give yourself time to let things happen naturally. ¨ Do not hold your breath while passing stools. ¨ Do not read while sitting on the toilet. Get off the toilet as soon as you have finished. · Take your medicines exactly as prescribed. Call your doctor if you think you are having a problem with your medicine. When should you call for help? Call 911 anytime you think you may need emergency care. For example, call if: 
  · You pass maroon or very bloody stools.  
 Call your doctor now or seek immediate medical care if: 
  · You have increased pain.  
  · You have increased bleeding.  
 Watch closely for changes in your health, and be sure to contact your doctor if: 
  · Your symptoms have not improved after 3 or 4 days. Where can you learn more? Go to http://jose-darvin.info/. Enter F228 in the search box to learn more about \"Hemorrhoids: Care Instructions. \" Current as of: May 12, 2017 Content Version: 11.7 © 6415-0112 [employer]. Care instructions adapted under license by [email or web address] (which disclaims liability or warranty for this information).  If you have questions about a medical condition or this instruction, always ask your healthcare professional. [address] any warranty or liability for your use of this information. Introducing [facility]! Dear Amalia Mccall: Thank you for requesting a Virgance account. Our records indicate that you already have an active Virgance account. You can access your account anytime at [email or web address] Did you know that you can access your hospital and ER discharge instructions at any time in Virgance? You can also review all of your test results from your hospital stay or ER visit. Additional Information If you have questions, please visit the Frequently Asked Questions section of the Virgance website at [email or web address] Remember, Virgance is NOT to be used for urgent needs. For medical emergencies, dial 911. Now available from your iPhone and Android! Please provide this summary of care documentation to your next provider. Your primary care clinician is listed as Samina Gan. If you have any questions after today's visit, please call [phone].

## 2018-09-14 NOTE — PATIENT INSTRUCTIONS

## 2018-09-14 NOTE — PROGRESS NOTES
Chief Complaint   Patient presents with    Hemorrhoids     bleeding and painful. Follow up for htn and hemorrhoids    bmi 43  Pt saw [place] Prior with Dr. Angle Ly. saxenda rx'd and pt is  not able to brittany at 3 mg so backed to 2.4 mg doing better. She has lost 50 pounds. Her energy level is good. She notes she is having hair loss. Subjective:   Idalia Cervantes is a 58 y.o. female with hypertension. Hypertension ROS: taking medications as instructed, no medication side effects noted, no TIA's, no chest pain on exertion, no dyspnea on exertion, no swelling of ankles. New concerns: she is taking irbesartan but notes bp in the 130-140/80 range. She has taking psuedophed for a cough bid for the past 2 days. Situational stress  Now much better  Doing well with new home and situation      Cholesterol  Had labs with STEPHANIE  Pulled from [facility] - discuss    Hemorrhoids  She reports sx for the past 2 monhts. She thinks if may be related to the saxenda. She did not use the miralax as recommended prior. She notes some constipation and diffucutly passing stool from time to time. She had colonscopy 2 years ago and overall wnl.       Past Medical History:   Diagnosis Date    Arthritis     total knee    Endometriosis 1/30/2009    resolved with hysterectomy    Hypertension     Ill-defined condition     IBS    Morbid obesity ([address]     Precancerous skin lesion     Urge incontinence 08/25/2010    abdullahi michell; botox     Past Surgical History:   Procedure Laterality Date    HX APPENDECTOMY  2010    HX COLONOSCOPY  11/8/15       10 years again    HX GYN      c section times 3    HX GYN      ectopic r fallopian tube resected    HX HYSTERECTOMY  1998    HX ORTHOPAEDIC  2006    right wrist fx    HX ORTHOPAEDIC  2010    left knee replacement     HX ORTHOPAEDIC Right 10/19/2017    Elbow     HX UROLOGICAL  2014,   12/3/15    Urethral sling      Social History     Social History    Marital status:      Spouse name: N/A    Number of children: N/A    Years of education: N/A     Social History Main Topics    Smoking status: Never Smoker    Smokeless tobacco: Never Used    Alcohol use 1.0 oz/week     2 Glasses of wine per week      Comment: on saturedays    Drug use: No    Sexual activity: Yes     Partners: Male     Other Topics Concern    None     Social History Narrative    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, Administative very busy in the nursing    Enjoys it, not stressfull, flexibility            3 sons healthy:  Inflammatory Bowel 1 son, Cdiff             Family History   Problem Relation Age of Onset    Cancer Father      prostate ca    Cancer Sister      lung ca 55    Heart Disease Mother 68     a fib    Prostate Cancer Brother      Current Outpatient Prescriptions   Medication Sig Dispense Refill    ARMOUR THYROID 30 mg tablet       AWAIS PEN NEEDLE 32 gauge x 5/32\" ndle       SAXENDA 3 mg/0.5 mL (18 mg/3 mL) pen       ESTRACE 0.01 % (0.1 mg/gram) vaginal cream       diazePAM (VALIUM) 2 mg tablet       montelukast (SINGULAIR) 10 mg tablet TAKE 1 TABLET DAILY 90 Tab 1    irbesartan (AVAPRO) 300 mg tablet Take 1 Tab by mouth daily for 90 days. 90 Tab 0    diphenoxylate-atropine (LOMOTIL) 2.5-0.025 mg per tablet Take 1 Tab by mouth three (3) times daily as needed for Diarrhea. Max Daily Amount: 3 Tabs. 45 Tab 0    cloNIDine HCl (CATAPRES) 0.1 mg tablet TAKE 1 TABLET TWICE A  Tab 0    metFORMIN ER (GLUCOPHAGE XR) 500 mg tablet       buPROPion SR (WELLBUTRIN SR) 150 mg SR tablet Take 1 Tab by mouth daily. 180 Tab 2    fluticasone-salmeterol (ADVAIR) 250-50 mcg/dose diskus inhaler Take 1 Puff by inhalation two (2) times a day. MUST rinse mouth after use 2 Inhaler 3    budesonide (RHINOCORT AQUA) 32 mcg/actuation nasal spray       magnesium 250 mg tab Take 400 mg by mouth daily.       omega-3 fatty acids-vitamin e (FISH OIL) 1,000 mg Cap Take 3 capsules by mouth daily.      cholecalciferol, vitamin d3, (VITAMIN D) 1,000 unit tablet Take 2,000 Units by mouth daily.  hydroCHLOROthiazide (HYDRODIURIL) 12.5 mg tablet TAKE 1 TABLET DAILY 90 Tab 2     Allergies   Allergen Reactions    Codeine Nausea and Vomiting    Erythromycin Rash    Hydrocodone Rash       Review of Systems - General ROS: negative for - chills, fatigue, fever, hot flashes, malaise, night sweats or sleep disturbance  Cardiovascular ROS: no chest pain or dyspnea on exertion  Respiratory ROS: no cough, shortness of breath, or wheezing    Visit Vitals    /77 (BP 1 Location: Left arm, BP Patient Position: Sitting)    Pulse 100    Temp 98.7 °F (37.1 °C) (Oral)    Resp 18    Ht 5' 3\" (1.6 m)    Wt 199 lb (90.3 kg)    SpO2 96%    BMI 35.25 kg/m2     General Appearance:  Well developed, well nourished,alert and oriented x 3, and individual in no acute distress. Ears/Nose/Mouth/Throat:   Hearing grossly normal.         Neck: Supple, no lad, no bruits   Chest:   Lungs clear to auscultation bilaterally. Cardiovascular:  Regular rate and rhythm, S1, S2 normal, no murmur. Abdomen:   Soft, non-tender, bowel sounds are active. Extremities: No edema bilaterally. Rectal protruding external hemmorroid and + internal hemorrhoids   Skin: Warm and dry, no suspicious lesions                 Diagnoses and all orders for this visit:    1. Essential hypertension  Cont meds  Not optimal control but I think with her weight loss we should keep her at her current doses. We may need to decrease. 2. Cough  She is using pseudophed, mild right max tenderness on palpation, no significant sx so will hold off on abx for now and cont conservative care,     3. Hemorrhoids, unspecified hemorrhoid type  Colonoscopy wnl  -     pramoxine-hydrocortisone (PROTOFOAM-HC) 2.5-1 % topical cream; Apply  to affected area three (3) times daily. 4. Drug-induced constipation  Will use mom or miralax    5.  BMI 35.0-35.9,adult  Follow up with Eladia,   Encouraged continued behavioral changes    Follow up for annual    This note will not be viewable in [address]

## 2018-09-19 ENCOUNTER — TELEPHONE (OUTPATIENT)
Dept: INTERNAL MEDICINE CLINIC | Age: 63
End: 2018-09-19

## 2018-09-19 DIAGNOSIS — K64.9 HEMORRHOIDS, UNSPECIFIED HEMORRHOID TYPE: ICD-10-CM

## 2018-09-19 RX ORDER — HYDROCORTISONE ACETATE, PRAMOXINE HCL 2.5; 1 G/100G; G/100G
CREAM TOPICAL 3 TIMES DAILY
Qty: 10 G | Refills: 0 | Status: SHIPPED | OUTPATIENT
Start: 2018-09-19 | End: 2018-09-26 | Stop reason: ALTCHOICE

## 2018-09-19 NOTE — TELEPHONE ENCOUNTER
[facility] needs to speak with nurse regarding pramoxine-hydrocortisone (PROTOFOAM-HC) 2.5-1 % topical cream  [facility] no is [phone]  Ref No [id number]

## 2018-09-19 NOTE — TELEPHONE ENCOUNTER
----- Message from Radha Gillette Cristobal sent at 9/18/2018  7:57 PM EDT -----  Regarding: Prescription Question  Contact: [phone]  Please help [facility] has not filled the order that Dr. Belem Villatoro placed last Friday because they are waiting for an answer to a question - please send the prescription into [facility], [facility] - thanks and advise when placed

## 2018-09-26 ENCOUNTER — OFFICE VISIT (OUTPATIENT)
Dept: INTERNAL MEDICINE CLINIC | Age: 63
End: 2018-09-26

## 2018-09-26 VITALS
OXYGEN SATURATION: 97 % | HEART RATE: 65 BPM | TEMPERATURE: 97.5 F | WEIGHT: 203.2 LBS | HEIGHT: 63 IN | DIASTOLIC BLOOD PRESSURE: 78 MMHG | BODY MASS INDEX: 36 KG/M2 | RESPIRATION RATE: 12 BRPM | SYSTOLIC BLOOD PRESSURE: 136 MMHG

## 2018-09-26 DIAGNOSIS — N39.0 RECURRENT UTI (URINARY TRACT INFECTION): ICD-10-CM

## 2018-09-26 DIAGNOSIS — H61.22 IMPACTED CERUMEN OF LEFT EAR: ICD-10-CM

## 2018-09-26 DIAGNOSIS — K64.9 HEMORRHOIDS, UNSPECIFIED HEMORRHOID TYPE: ICD-10-CM

## 2018-09-26 DIAGNOSIS — R35.0 URINARY FREQUENCY: Primary | ICD-10-CM

## 2018-09-26 LAB
BILIRUB UR QL STRIP: NEGATIVE
GLUCOSE UR-MCNC: NEGATIVE MG/DL
KETONES P FAST UR STRIP-MCNC: NEGATIVE MG/DL
PH UR STRIP: 5.5 [PH] (ref 4.6–8)
PROT UR QL STRIP: ABNORMAL
SP GR UR STRIP: 1.02 (ref 1–1.03)
UA UROBILINOGEN AMB POC: ABNORMAL (ref 0.2–1)
URINALYSIS CLARITY POC: ABNORMAL
URINALYSIS COLOR POC: YELLOW
URINE BLOOD POC: ABNORMAL
URINE LEUKOCYTES POC: ABNORMAL
URINE NITRITES POC: POSITIVE

## 2018-09-26 RX ORDER — CIPROFLOXACIN 500 MG/1
500 TABLET ORAL 2 TIMES DAILY
Qty: 14 TAB | Refills: 0 | Status: SHIPPED | OUTPATIENT
Start: 2018-09-26 | End: 2019-08-07 | Stop reason: ALTCHOICE

## 2018-09-26 RX ORDER — PHENAZOPYRIDINE HYDROCHLORIDE 200 MG/1
200 TABLET, FILM COATED ORAL
Qty: 10 TAB | Refills: 0 | Status: SHIPPED | OUTPATIENT
Start: 2018-09-26 | End: 2018-09-29

## 2018-09-26 RX ORDER — HYDROCORTISONE 25 MG/G
CREAM TOPICAL 4 TIMES DAILY
Qty: 30 G | Refills: 2 | Status: SHIPPED | OUTPATIENT
Start: 2018-09-26 | End: 2020-09-10

## 2018-09-26 NOTE — PROGRESS NOTES
HISTORY OF PRESENT ILLNESS  Trip Fung is a 58 y.o. female. HPI  Subjective:     Trip Fung is a 58 y.o. female who complains of dysuria, frequency, urgency for 5 days. Patient also complains of stomach ache. Patient denies back pain, fever and vaginal discharge. Patient does have a history of recurrent UTI. Patient does not have a history of pyelonephritis. Complains of left ear fullness and has been using Debrox cerumen removal drops for the past several days without improvement. Has had issues with cerumen impactions in the past that required irrigation. Reports that she was prescribed Proctofoam cream for hemorrhoidal flare but this was not covered by her insurance. Has tried various OTC remedies without improvement. Current BMI 36; she has been seeing PA at [facility] and has lost about 40 lbs on Saxenda injections; she was unable to tolerate 3 mg dose due to vomiting but doing well on 2.4 mg dose. Trying to increase exercise. Review of Systems   Constitutional: Positive for malaise/fatigue. Negative for chills and fever. HENT: Positive for ear pain (left ear fullness). Negative for congestion. Respiratory: Negative for cough and shortness of breath. Cardiovascular: Negative for chest pain and palpitations. Gastrointestinal: Negative for nausea and vomiting. Genitourinary: Positive for dysuria, frequency and urgency. Negative for flank pain and hematuria. Musculoskeletal: Negative for back pain and myalgias. Neurological: Negative for dizziness and headaches. /78 (BP 1 Location: Left arm, BP Patient Position: Sitting)  Pulse 65  Temp 97.5 °F (36.4 °C) (Oral)   Resp 12  Ht 5' 3\" (1.6 m)  Wt 203 lb 3.2 oz (92.2 kg)  SpO2 97%  BMI 36 kg/m2  Physical Exam   Constitutional: She is oriented to person, place, and time. She appears well-developed and well-nourished. HENT:   Head: Normocephalic and atraumatic.    Right Ear: External ear normal.   Left Ear: External ear normal.   Nose: Nose normal.   Mouth/Throat: Oropharynx is clear and moist.   Left ear canal occluded with cerumen   Neck: Normal range of motion. Neck supple. No thyromegaly present. Cardiovascular: Normal rate and regular rhythm. Pulmonary/Chest: Effort normal and breath sounds normal. She has no wheezes. She has no rales. Abdominal: Soft. Bowel sounds are normal. There is tenderness in the suprapubic area. There is no rebound and no guarding. Musculoskeletal: Normal range of motion. Lymphadenopathy:     She has no cervical adenopathy. Neurological: She is alert and oriented to person, place, and time. Skin: Skin is warm and dry. No rash noted. Psychiatric: She has a normal mood and affect. Her behavior is normal.   Nursing note and vitals reviewed. Results for orders placed or performed in visit on 09/26/18   AMB POC URINALYSIS DIP STICK AUTO W/O MICRO     Status: Abnormal   Result Value Ref Range Status    Color (UA POC) Yellow  Final    Clarity (UA POC) Turbid  Final    Glucose (UA POC) Negative Negative Final    Bilirubin (UA POC) Negative Negative Final    Ketones (UA POC) Negative Negative Final    Specific gravity (UA POC) 1.020 1.001 - 1.035 Final    Blood (UA POC) 2+ Negative Final    pH (UA POC) 5.5 4.6 - 8.0 Final    Protein (UA POC) 1+ Negative Final    Urobilinogen (UA POC) 0.2 mg/dL 0.2 - 1 Final    Nitrites (UA POC) Positive Negative Final    Leukocyte esterase (UA POC) 3+ Negative Final       ASSESSMENT and PLAN  Diagnoses and all orders for this visit:    1. Urinary frequency  -     AMB POC URINALYSIS DIP STICK AUTO W/O MICRO  -     CULTURE, URINE    2. Recurrent UTI (urinary tract infection)  -     ciprofloxacin HCl (CIPRO) 500 mg tablet; Take 1 Tab by mouth two (2) times a day. -     phenazopyridine (PYRIDIUM) 200 mg tablet; Take 1 Tab by mouth three (3) times daily as needed for Pain for up to 3 days.     3. Hemorrhoids, unspecified hemorrhoid type  -     hydrocortisone (ANUSOL-HC) 2.5 % rectal cream; Insert  into rectum four (4) times daily. 4. Impacted cerumen of left ear -- moderate amount of cerumen removed via irrigation  -     IA REMOVAL IMPACTED CERUMEN IRRIGATION/LVG UNILAT    5. BMI 36.0-36.9,adult -- she has been doing well with weight loss plan  Discussed the patient's BMI with her. The BMI follow up plan is as follows:     dietary management education, guidance, and counseling  encourage exercise  monitor weight  prescribed dietary intake    An After Visit Summary was printed and given to the patient.     lab results and schedule of future lab studies reviewed with patient  reviewed diet, exercise and weight control  reviewed medications and side effects in detail

## 2018-09-26 NOTE — PATIENT INSTRUCTIONS
Earwax Blockage: Care Instructions  Your Care Instructions    Earwax is a natural substance that protects the ear canal. Normally, earwax drains from the ears and does not cause problems. Sometimes earwax builds up and hardens. Earwax blockage (also called cerumen impaction) can cause some loss of hearing and pain. When wax is tightly packed, you will need to have your doctor remove it. Follow-up care is a key part of your treatment and safety. Be sure to make and go to all appointments, and call your doctor if you are having problems. It's also a good idea to know your test results and keep a list of the medicines you take. How can you care for yourself at home? · Do not try to remove earwax with cotton swabs, fingers, or other objects. This can make the blockage worse and damage the eardrum. · If your doctor recommends that you try to remove earwax at home:  ¨ Soften and loosen the earwax with warm mineral oil. You also can try hydrogen peroxide mixed with an equal amount of room temperature water. Place 2 drops of the fluid, warmed to body temperature, in the ear two times a day for up to 5 days. ¨ Once the wax is loose and soft, all that is usually needed to remove it from the ear canal is a gentle, warm shower. Direct the water into the ear, then tip your head to let the earwax drain out. Dry your ear thoroughly with a hair dryer set on low. Hold the dryer several inches from your ear. ¨ If the warm mineral oil and shower do not work, use an over-the-counter wax softener. Read and follow all instructions on the label. After using the wax softener, use an ear syringe to gently flush the ear. Make sure the flushing solution is body temperature. Cool or hot fluids in the ear can cause dizziness. When should you call for help? Call your doctor now or seek immediate medical care if:    · Pus or blood drains from your ear.     · Your ears are ringing or feel full.     · You have a loss of hearing.  Watch closely for changes in your health, and be sure to contact your doctor if:    · You have pain or reduced hearing after 1 week of home treatment.     · You have any new symptoms, such as nausea or balance problems. Where can you learn more? Go to http://jose-darvin.info/. Enter L830 in the search box to learn more about \"Earwax Blockage: Care Instructions. \"  Current as of: November 20, 2017  Content Version: 11.7  © 9759-7405 Basha. Care instructions adapted under license by [employer] (which disclaims liability or warranty for this information). If you have questions about a medical condition or this instruction, always ask your healthcare professional. [place] [address] any warranty or liability for your use of this information. Urinary Tract Infection in Women: Care Instructions  Your Care Instructions    A urinary tract infection, or UTI, is a general term for an infection anywhere between the kidneys and the urethra (where urine comes out). Most UTIs are bladder infections. They often cause pain or burning when you urinate. UTIs are caused by bacteria and can be cured with antibiotics. Be sure to complete your treatment so that the infection goes away. Follow-up care is a key part of your treatment and safety. Be sure to make and go to all appointments, and call your doctor if you are having problems. It's also a good idea to know your test results and keep a list of the medicines you take. How can you care for yourself at home? · Take your antibiotics as directed. Do not stop taking them just because you feel better. You need to take the full course of antibiotics. · Drink extra water and other fluids for the next day or two. This may help wash out the bacteria that are causing the infection.  (If you have kidney, heart, or liver disease and have to limit fluids, talk with your doctor before you increase your fluid intake.)  · Avoid drinks that are carbonated or have caffeine. They can irritate the bladder. · Urinate often. Try to empty your bladder each time. · To relieve pain, take a hot bath or lay a heating pad set on low over your lower belly or genital area. Never go to sleep with a heating pad in place. To prevent UTIs  · Drink plenty of water each day. This helps you urinate often, which clears bacteria from your system. (If you have kidney, heart, or liver disease and have to limit fluids, talk with your doctor before you increase your fluid intake.)  · Urinate when you need to. · Urinate right after you have sex. · Change sanitary pads often. · Avoid douches, bubble baths, feminine hygiene sprays, and other feminine hygiene products that have deodorants. · After going to the bathroom, wipe from front to back. When should you call for help? Call your doctor now or seek immediate medical care if:    · Symptoms such as fever, chills, nausea, or vomiting get worse or appear for the first time.     · You have new pain in your back just below your rib cage. This is called flank pain.     · There is new blood or pus in your urine.     · You have any problems with your antibiotic medicine.    Watch closely for changes in your health, and be sure to contact your doctor if:    · You are not getting better after taking an antibiotic for 2 days.     · Your symptoms go away but then come back. Where can you learn more? Go to http://jose-darvin.info/. Enter K030 in the search box to learn more about \"Urinary Tract Infection in Women: Care Instructions. \"  Current as of: May 12, 2017  Content Version: 11.7  © 4783-1392 [employer]. Care instructions adapted under license by [employer] (which disclaims liability or warranty for this information).  If you have questions about a medical condition or this instruction, always ask your healthcare professional. [employer] disclaims any warranty or liability for your use of this information. Body Mass Index: Care Instructions  Your Care Instructions    Body mass index (BMI) can help you see if your weight is raising your risk for health problems. It uses a formula to compare how much you weigh with how tall you are. · A BMI lower than 18.5 is considered underweight. · A BMI between 18.5 and 24.9 is considered healthy. · A BMI between 25 and 29.9 is considered overweight. A BMI of 30 or higher is considered obese. If your BMI is in the normal range, it means that you have a lower risk for weight-related health problems. If your BMI is in the overweight or obese range, you may be at increased risk for weight-related health problems, such as high blood pressure, heart disease, stroke, arthritis or joint pain, and diabetes. If your BMI is in the underweight range, you may be at increased risk for health problems such as fatigue, lower protection (immunity) against illness, muscle loss, bone loss, hair loss, and hormone problems. BMI is just one measure of your risk for weight-related health problems. You may be at higher risk for health problems if you are not active, you eat an unhealthy diet, or you drink too much alcohol or use tobacco products. Follow-up care is a key part of your treatment and safety. Be sure to make and go to all appointments, and call your doctor if you are having problems. It's also a good idea to know your test results and keep a list of the medicines you take. How can you care for yourself at home? · Practice healthy eating habits. This includes eating plenty of fruits, vegetables, whole grains, lean protein, and low-fat dairy. · If your doctor recommends it, get more exercise. Walking is a good choice. Bit by bit, increase the amount you walk every day. Try for at least 30 minutes on most days of the week. · Do not smoke. Smoking can increase your risk for health problems.  If you need help quitting, talk to your doctor about stop-smoking programs and medicines. These can increase your chances of quitting for good. · Limit alcohol to 2 drinks a day for men and 1 drink a day for women. Too much alcohol can cause health problems. If you have a BMI higher than 25  · Your doctor may do other tests to check your risk for weight-related health problems. This may include measuring the distance around your waist. A waist measurement of more than 40 inches in men or 35 inches in women can increase the risk of weight-related health problems. · Talk with your doctor about steps you can take to stay healthy or improve your health. You may need to make lifestyle changes to lose weight and stay healthy, such as changing your diet and getting regular exercise. If you have a BMI lower than 18.5  · Your doctor may do other tests to check your risk for health problems. · Talk with your doctor about steps you can take to stay healthy or improve your health. You may need to make lifestyle changes to gain or maintain weight and stay healthy, such as getting more healthy foods in your diet and doing exercises to build muscle. Where can you learn more? Go to http://jose-darvin.info/. Enter S176 in the search box to learn more about \"Body Mass Index: Care Instructions. \"  Current as of: October 13, 2016  Content Version: 11.4  © 1748-0793 Healthwise, Incorporated. Care instructions adapted under license by [employer] (which disclaims liability or warranty for this information). If you have questions about a medical condition or this instruction, always ask your healthcare professional. [place] [address] any warranty or liability for your use of this information.

## 2018-09-26 NOTE — MR AVS SNAPSHOT
[facility] 
 
 
 [address] [place] [address] [facility] 
[phone] Patient: Estefany Spicer MRN: [id number] SIR:[id number] Visit Information Date & Time Provider Department Dept. Phone Encounter #  
 9/26/2018 11:00 AM Reanna Gillespie NP [facility] [address] [id number] Upcoming Health Maintenance Date Due Shingrix Vaccine Age 50> (1 of 2) 5/26/2004 FOBT Q 1 YEAR AGE 50-75 5/31/2017 Influenza Age 5 to Adult 8/1/2018 BREAST CANCER SCRN MAMMOGRAM 5/30/2019 PAP AKA CERVICAL CYTOLOGY 5/31/2019 DTaP/Tdap/Td series (2 - Td) 5/31/2026 Allergies as of 9/26/2018  Review Complete On: 9/26/2018 By: Reanna Gillespie NP Severity Noted Reaction Type Reactions Codeine  08/25/2010    Nausea and Vomiting Erythromycin  11/21/2008    Rash Hydrocodone  11/24/2015    Rash Current Immunizations  Reviewed on 5/31/2016 Name Date Influenza Vaccine (Quad) PF 11/29/2017 Tdap 5/31/2016 Zoster Vaccine, Live 3/7/2012 Not reviewed this visit You Were Diagnosed With   
  
 Codes Comments Urinary frequency    -  Primary ICD-10-CM: R35.0 ICD-9-CM: 788.41 Hemorrhoids, unspecified hemorrhoid type     ICD-10-CM: K64.9 ICD-9-CM: 455.6 Recurrent UTI (urinary tract infection)     ICD-10-CM: N39.0 ICD-9-CM: 599.0 Impacted cerumen of left ear     ICD-10-CM: H61.22 
ICD-9-CM: 380.4 Vitals BP Pulse Temp Resp Height(growth percentile) Weight(growth percentile) 154/79 (BP 1 Location: Left arm, BP Patient Position: Sitting) 65 97.5 °F (36.4 °C) (Oral) 12 5' 3\" (1.6 m) 203 lb 3.2 oz (92.2 kg) SpO2 BMI OB Status Smoking Status 97% 36 kg/m2 Hysterectomy Never Smoker BMI and BSA Data Body Mass Index Body Surface Area  
 36 kg/m 2 2.02 m 2 Preferred Pharmacy Pharmacy Name Phone [facility] [address] AT [address] [phone] Your Updated Medication List  
  
   
This list is accurate as of 9/26/18 11:52 AM.  Always use your most recent med list.  
  
  
  
  
 ARMOUR THYROID 30 mg tablet Generic drug:  thyroid (Pork) buPROPion  mg SR tablet Commonly known as:  Mandie Bridegroom Take 1 Tab by mouth daily. ciprofloxacin HCl 500 mg tablet Commonly known as:  CIPRO Take 1 Tab by mouth two (2) times a day. cloNIDine HCl 0.1 mg tablet Commonly known as:  CATAPRES  
TAKE 1 TABLET TWICE A DAY  
  
 diazePAM 2 mg tablet Commonly known as:  VALIUM  
  
 diphenoxylate-atropine 2.5-0.025 mg per tablet Commonly known as:  LOMOTIL Take 1 Tab by mouth three (3) times daily as needed for Diarrhea. Max Daily Amount: 3 Tabs. ESTRACE 0.01 % (0.1 mg/gram) vaginal cream  
Generic drug:  estradiol FISH OIL 1,000 mg Cap Generic drug:  omega-3 fatty acids-vitamin e Take 3 capsules by mouth daily. fluticasone-salmeterol 250-50 mcg/dose diskus inhaler Commonly known as:  ADVAIR Take 1 Puff by inhalation two (2) times a day. MUST rinse mouth after use  
  
 hydroCHLOROthiazide 12.5 mg tablet Commonly known as:  HYDRODIURIL  
TAKE 1 TABLET DAILY  
  
 hydrocortisone 2.5 % rectal cream  
Commonly known as:  ANUSOL-HC Insert  into rectum four (4) times daily. irbesartan 300 mg tablet Commonly known as:  AVAPRO Take 1 Tab by mouth daily for 90 days. magnesium 250 mg Tab Take 400 mg by mouth daily. metFORMIN  mg tablet Commonly known as:  GLUCOPHAGE XR  
  
 montelukast 10 mg tablet Commonly known as:  SINGULAIR  
TAKE 1 TABLET DAILY Michelle Pen Needle 32 gauge x 5/32\" Ndle Generic drug:  Insulin Needles (Disposable) phenazopyridine 200 mg tablet Commonly known as:  PYRIDIUM  
 Take 1 Tab by mouth three (3) times daily as needed for Pain for up to 3 days. RHINOCORT AQUA 32 mcg/actuation nasal spray Generic drug:  budesonide SAXENDA 3 mg/0.5 mL (18 mg/3 mL) pen Generic drug:  liraglutide VITAMIN D3 1,000 unit tablet Generic drug:  cholecalciferol Take 2,000 Units by mouth daily. Prescriptions Sent to Pharmacy Refills  
 hydrocortisone (ANUSOL-HC) 2.5 % rectal cream 2 Sig: Insert  into rectum four (4) times daily. Class: Normal  
 Pharmacy: [facility] [place] [address] Ph #: [phone] Route: Rectal  
 ciprofloxacin HCl (CIPRO) 500 mg tablet 0 Sig: Take 1 Tab by mouth two (2) times a day. Class: Normal  
 Pharmacy: [place] [address] Ph #: [phone] Route: Oral  
 phenazopyridine (PYRIDIUM) 200 mg tablet 0 Sig: Take 1 Tab by mouth three (3) times daily as needed for Pain for up to 3 days. Class: Normal  
 Pharmacy: [place] [address] Ph #: [phone] Route: Oral  
  
We Performed the Following AMB POC URINALYSIS DIP STICK AUTO W/O MICRO [81170 CPT(R)] CULTURE, URINE [id number] CPT(R)] Patient Instructions Earwax Blockage: Care Instructions Your Care Instructions Earwax is a natural substance that protects the ear canal. Normally, earwax drains from the ears and does not cause problems. Sometimes earwax builds up and hardens. Earwax blockage (also called cerumen impaction) can cause some loss of hearing and pain. When wax is tightly packed, you will need to have your doctor remove it. Follow-up care is a key part of your treatment and safety.  Be sure to make and go to all appointments, and call your doctor if you are having problems. It's also a good idea to know your test results and keep a list of the medicines you take. How can you care for yourself at home? · Do not try to remove earwax with cotton swabs, fingers, or other objects. This can make the blockage worse and damage the eardrum. · If your doctor recommends that you try to remove earwax at home: ¨ Soften and loosen the earwax with warm mineral oil. You also can try hydrogen peroxide mixed with an equal amount of room temperature water. Place 2 drops of the fluid, warmed to body temperature, in the ear two times a day for up to 5 days. ¨ Once the wax is loose and soft, all that is usually needed to remove it from the ear canal is a gentle, warm shower. Direct the water into the ear, then tip your head to let the earwax drain out. Dry your ear thoroughly with a hair dryer set on low. Hold the dryer several inches from your ear. ¨ If the warm mineral oil and shower do not work, use an over-the-counter wax softener. Read and follow all instructions on the label. After using the wax softener, use an ear syringe to gently flush the ear. Make sure the flushing solution is body temperature. Cool or hot fluids in the ear can cause dizziness. When should you call for help? Call your doctor now or seek immediate medical care if: 
  · Pus or blood drains from your ear.  
  · Your ears are ringing or feel full.  
  · You have a loss of hearing.  
 Watch closely for changes in your health, and be sure to contact your doctor if: 
  · You have pain or reduced hearing after 1 week of home treatment.  
  · You have any new symptoms, such as nausea or balance problems. Where can you learn more? Go to http://jose-darvin.info/. Enter B426 in the search box to learn more about \"Earwax Blockage: Care Instructions. \" Current as of: November 20, 2017 Content Version: 11.7 © 9233-4349 [employer].  Care instructions adapted under license by [address] (which disclaims liability or warranty for this information). If you have questions about a medical condition or this instruction, always ask your healthcare professional. [place] [address] any warranty or liability for your use of this information. Urinary Tract Infection in Women: Care Instructions Your Care Instructions A urinary tract infection, or UTI, is a general term for an infection anywhere between the kidneys and the urethra (where urine comes out). Most UTIs are bladder infections. They often cause pain or burning when you urinate. UTIs are caused by bacteria and can be cured with antibiotics. Be sure to complete your treatment so that the infection goes away. Follow-up care is a key part of your treatment and safety. Be sure to make and go to all appointments, and call your doctor if you are having problems. It's also a good idea to know your test results and keep a list of the medicines you take. How can you care for yourself at home? · Take your antibiotics as directed. Do not stop taking them just because you feel better. You need to take the full course of antibiotics. · Drink extra water and other fluids for the next day or two. This may help wash out the bacteria that are causing the infection. (If you have kidney, heart, or liver disease and have to limit fluids, talk with your doctor before you increase your fluid intake.) · Avoid drinks that are carbonated or have caffeine. They can irritate the bladder. · Urinate often. Try to empty your bladder each time. · To relieve pain, take a hot bath or lay a heating pad set on low over your lower belly or genital area. Never go to sleep with a heating pad in place. To prevent UTIs · Drink plenty of water each day. This helps you urinate often, which clears bacteria from your system.  (If you have kidney, heart, or liver disease and have to limit fluids, talk with your doctor before you increase your fluid intake.) · Urinate when you need to. · Urinate right after you have sex. · Change sanitary pads often. · Avoid douches, bubble baths, feminine hygiene sprays, and other feminine hygiene products that have deodorants. · After going to the bathroom, wipe from front to back. When should you call for help? Call your doctor now or seek immediate medical care if: 
  · Symptoms such as fever, chills, nausea, or vomiting get worse or appear for the first time.  
  · You have new pain in your back just below your rib cage. This is called flank pain.  
  · There is new blood or pus in your urine.  
  · You have any problems with your antibiotic medicine.  
 Watch closely for changes in your health, and be sure to contact your doctor if: 
  · You are not getting better after taking an antibiotic for 2 days.  
  · Your symptoms go away but then come back. Where can you learn more? Go to http://jose-darvin.info/. Enter B580 in the search box to learn more about \"Urinary Tract Infection in Women: Care Instructions. \" Current as of: May 12, 2017 Content Version: 11.7 © 3230-1787 [employer]. Care instructions adapted under license by [employer] (which disclaims liability or warranty for this information). If you have questions about a medical condition or this instruction, always ask your healthcare professional. [place] [address] any warranty or liability for your use of this information. Introducing [address]! Dear [employer]: Thank you for requesting a Jellycoaster account. Our records indicate that you already have an active Jellycoaster account. You can access your account anytime at [email or web address] Did you know that you can access your hospital and ER discharge instructions at any time in Jellycoaster?   You can also review all of your test results from your hospital stay or ER visit. Additional Information If you have questions, please visit the Frequently Asked Questions section of the EggCartel website at [email or web address]. [email or web address] Remember, EggCartel is NOT to be used for urgent needs. For medical emergencies, dial 911. Now available from your iPhone and Android! Please provide this summary of care documentation to your next provider. Your primary care clinician is listed as Shelton George. If you have any questions after today's visit, please call [phone].

## 2018-10-02 LAB — BACTERIA UR CULT: ABNORMAL

## 2018-10-09 DIAGNOSIS — K64.9 HEMORRHOIDS, UNSPECIFIED HEMORRHOID TYPE: ICD-10-CM

## 2018-10-09 RX ORDER — PRAMOXINE HYDROCHLORIDE 10 MG/G
AEROSOL, FOAM TOPICAL
Qty: 1 CAN | Refills: 1 | Status: SHIPPED | OUTPATIENT
Start: 2018-10-09 | End: 2019-08-07 | Stop reason: ALTCHOICE

## 2018-10-22 RX ORDER — IRBESARTAN 300 MG/1
300 TABLET ORAL DAILY
Qty: 90 TAB | Refills: 1 | Status: SHIPPED | OUTPATIENT
Start: 2018-10-22 | End: 2019-04-24 | Stop reason: SDUPTHER

## 2018-11-19 ENCOUNTER — HOSPITAL ENCOUNTER (OUTPATIENT)
Dept: MAMMOGRAPHY | Age: 63
Discharge: HOME | End: 2018-11-19
Attending: INTERNAL MEDICINE
Payer: COMMERCIAL

## 2018-11-19 DIAGNOSIS — Z12.31 VISIT FOR SCREENING MAMMOGRAM: ICD-10-CM

## 2018-11-19 PROCEDURE — 77067 SCR MAMMO BI INCL CAD: CPT

## 2019-03-05 DIAGNOSIS — J98.01 BRONCHOSPASM: ICD-10-CM

## 2019-03-05 RX ORDER — MONTELUKAST SODIUM 10 MG/1
TABLET ORAL
Qty: 90 TAB | Refills: 1 | Status: SHIPPED | OUTPATIENT
Start: 2019-03-05 | End: 2019-05-16 | Stop reason: SDUPTHER

## 2019-03-06 ENCOUNTER — HOSPITAL ENCOUNTER (OUTPATIENT)
Dept: CT IMAGING | Age: 64
Discharge: HOME | End: 2019-03-06
Attending: ORTHOPAEDIC SURGERY
Payer: COMMERCIAL

## 2019-03-06 DIAGNOSIS — M17.11 ARTHRITIS OF KNEE, RIGHT: ICD-10-CM

## 2019-03-06 DIAGNOSIS — M25.569 KNEE PAIN: ICD-10-CM

## 2019-03-06 PROCEDURE — 73700 CT LOWER EXTREMITY W/O DYE: CPT

## 2019-04-24 RX ORDER — IRBESARTAN 300 MG/1
300 TABLET ORAL DAILY
Qty: 90 TAB | Refills: 0 | Status: SHIPPED | OUTPATIENT
Start: 2019-04-24 | End: 2019-05-16 | Stop reason: SDUPTHER

## 2019-07-05 ENCOUNTER — TELEPHONE (OUTPATIENT)
Dept: INTERNAL MEDICINE CLINIC | Age: 64
End: 2019-07-05

## 2019-07-05 DIAGNOSIS — I10 ESSENTIAL HYPERTENSION: Primary | ICD-10-CM

## 2019-07-05 RX ORDER — IRBESARTAN 300 MG/1
TABLET ORAL
Qty: 30 TAB | Refills: 0 | Status: SHIPPED | OUTPATIENT
Start: 2019-07-05 | End: 2019-07-12 | Stop reason: SDUPTHER

## 2019-07-05 NOTE — TELEPHONE ENCOUNTER
Received notification from [facility] that Irbesartan is on backorder.  It has been sent to pts preferred local [facility] pharmacy

## 2019-07-05 NOTE — TELEPHONE ENCOUNTER
----- Message from [employer] Rack sent at 7/5/2019  2:00 PM EDT -----  Regarding: Dr. Mardell Essex [place]/ telephone   Pt wants to leave pharmacy information. [facility] [phone].  Best contact [phone]

## 2019-07-11 ENCOUNTER — TELEPHONE (OUTPATIENT)
Dept: INTERNAL MEDICINE CLINIC | Age: 64
End: 2019-07-11

## 2019-07-11 NOTE — TELEPHONE ENCOUNTER
----- Message from Dmitri Asher sent at 7/11/2019  5:20 PM EDT -----  Regarding: /Telephone  Magy Galvan Message/Vendor Calls    Caller's first and last name: Srinath with ([facility])      Reason for call: Pt's Rx Ibesarstan 300mg is on back order is there an alternative Rx to be prescribed      Call back required yes/no and why: Yes       Best contact number(s): [phone] Ref # [id number]      Details to clarify the request:      Dmitri Asher

## 2019-07-12 DIAGNOSIS — I10 ESSENTIAL HYPERTENSION: ICD-10-CM

## 2019-07-12 RX ORDER — IRBESARTAN 300 MG/1
TABLET ORAL
Qty: 90 TAB | Refills: 0 | Status: SHIPPED | OUTPATIENT
Start: 2019-07-12 | End: 2019-07-24 | Stop reason: ALTCHOICE

## 2019-07-24 RX ORDER — OLMESARTAN MEDOXOMIL 40 MG/1
40 TABLET ORAL DAILY
Qty: 90 TAB | Refills: 0 | Status: SHIPPED | OUTPATIENT
Start: 2019-07-24 | End: 2019-08-07 | Stop reason: ALTCHOICE

## 2019-08-07 ENCOUNTER — OFFICE VISIT (OUTPATIENT)
Dept: INTERNAL MEDICINE CLINIC | Age: 64
End: 2019-08-07

## 2019-08-07 VITALS
TEMPERATURE: 97.6 F | OXYGEN SATURATION: 97 % | SYSTOLIC BLOOD PRESSURE: 116 MMHG | HEIGHT: 63 IN | DIASTOLIC BLOOD PRESSURE: 73 MMHG | BODY MASS INDEX: 36.96 KG/M2 | RESPIRATION RATE: 12 BRPM | WEIGHT: 208.6 LBS | HEART RATE: 50 BPM

## 2019-08-07 DIAGNOSIS — E55.9 VITAMIN D DEFICIENCY: ICD-10-CM

## 2019-08-07 DIAGNOSIS — E53.8 B12 DEFICIENCY DUE TO DIET: ICD-10-CM

## 2019-08-07 DIAGNOSIS — R19.7 DIARRHEA, UNSPECIFIED TYPE: ICD-10-CM

## 2019-08-07 DIAGNOSIS — R41.3 MEMORY CHANGE: ICD-10-CM

## 2019-08-07 DIAGNOSIS — I10 ESSENTIAL HYPERTENSION: ICD-10-CM

## 2019-08-07 DIAGNOSIS — Z00.00 WELL ADULT EXAM: Primary | ICD-10-CM

## 2019-08-07 RX ORDER — IRBESARTAN 300 MG/1
300 TABLET ORAL
COMMUNITY
End: 2019-08-21 | Stop reason: ALTCHOICE

## 2019-08-07 RX ORDER — LEVOTHYROXINE AND LIOTHYRONINE 57; 13.5 UG/1; UG/1
45 TABLET ORAL DAILY
Qty: 90 TAB | Refills: 1
Start: 2019-08-07 | End: 2020-09-10

## 2019-08-07 RX ORDER — DIPHENOXYLATE HYDROCHLORIDE AND ATROPINE SULFATE 2.5; .025 MG/1; MG/1
1 TABLET ORAL
Qty: 45 TAB | Refills: 0 | Status: SHIPPED | OUTPATIENT
Start: 2019-08-07 | End: 2020-06-01 | Stop reason: SDUPTHER

## 2019-08-07 RX ORDER — BUPROPION HCL 300 MG
200 TABLET, EXTENDED RELEASE 24 HR ORAL 2 TIMES DAILY
COMMUNITY
End: 2020-10-22

## 2019-08-07 RX ORDER — MELOXICAM 15 MG/1
15 TABLET ORAL DAILY
Qty: 30 TAB | Refills: 3
Start: 2019-08-07 | End: 2020-10-30

## 2019-08-07 NOTE — PROGRESS NOTES
Asad Nicolas is a 71 y.o. female and presents with Complete Physical  .  Subjective:    Pt here for follow up annual.   Since last visit she has been doing well. She has a new grandson. bmi 36.9  She has lost close to 50 pounds with Anival Tiwari /Dr. Samantha Murray with [facility]  Energy level good   Would like labs done    Memory issues  She notes for at least 6 months  Stable not progressing  Hours sleep at night 5 hours and will wake up after 4-5 hours then will go back to sleep 3 hours/total 8 hours      Severe diarrhea  Can't eat fried food for a couple of months  She will have severe constipation then diarrhea  No blood in stool   colonscopy 2015  She has been taking low-dose magnesium     Subjective:   Asad Nicolas is a 71 y.o. female with hypertension. Hypertension ROS: taking medications as instructed, no medication side effects noted, no TIA's, no chest pain on exertion, no dyspnea on exertion, no swelling of ankles. New concerns none        incontinece  Working with dr. Carito Johnson  Now candidate for botox and approved  She is no longer on stool softener and bm not ideal        Review of Systems  Constitutional: negative for fevers, chills, anorexia and weight loss  Eyes:   negative for visual disturbance and irritation  ENT:   negative for tinnitus,sore throat,nasal congestion,ear pains. hoarseness  Respiratory:  negative for cough, hemoptysis, dyspnea,wheezing  CV:   negative for chest pain, palpitations, lower extremity edema  GI:   negative for nausea, vomiting, diarrhea, abdominal pain,melena  Endo:               negative for polyuria,polydipsia,polyphagia,heat intolerance  Genitourinary: negative for frequency, dysuria and hematuria  Integument:  negative for rash and pruritus  Hematologic:  negative for easy bruising and gum/nose bleeding  Musculoskel: negative for myalgias, arthralgias, back pain, muscle weakness, joint pain  Neurological:  negative for headaches, dizziness, vertigo, memory problems and gait   Behavl/Psych: negative for feelings of anxiety, depression, mood changes    Past Medical History:   Diagnosis Date    Arthritis     total knee    Endometriosis 1/30/2009    resolved with hysterectomy    Hypertension     Ill-defined condition     IBS    Morbid obesity ([facility] [address]     Precancerous skin lesion     Urge incontinence 08/25/2010    abdullahi michell; botox     Past Surgical History:   Procedure Laterality Date    HX APPENDECTOMY  2010    HX COLONOSCOPY  11/8/15       10 years again    HX GYN      c section times 3    HX GYN      ectopic r fallopian tube resected    HX HYSTERECTOMY  1998    HX ORTHOPAEDIC  2006    right wrist fx    HX ORTHOPAEDIC  2010    left knee replacement     HX ORTHOPAEDIC Right 10/19/2017    Elbow     HX UROLOGICAL  2014,   12/3/15    Urethral sling      Social History     Socioeconomic History    Marital status:      Spouse name: Not on file    Number of children: Not on file    Years of education: Not on file    Highest education level: Not on file   Tobacco Use    Smoking status: Never Smoker    Smokeless tobacco: Never Used   Substance and Sexual Activity    Alcohol use: Yes     Alcohol/week: 1.7 standard drinks     Types: 2 Glasses of wine per week     Comment: on saturedays    Drug use: No    Sexual activity: Yes     Partners: Male   Social History Narrative    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       irbesartan (AVAPRO) 300 mg tablet Take 300 mg by mouth nightly.  montelukast (SINGULAIR) 10 mg tablet Take 1 Tab by mouth daily. 90 Tab 1    Pramoxine (PROCTOFOAM) 1 % topical foam Apply to the affected area three times daily 1 Can 1    hydrocortisone (ANUSOL-HC) 2.5 % rectal cream Insert  into rectum four (4) times daily. 30 g 2    ARMOUR THYROID 30 mg tablet       ESTRACE 0.01 % (0.1 mg/gram) vaginal cream       diphenoxylate-atropine (LOMOTIL) 2.5-0.025 mg per tablet Take 1 Tab by mouth three (3) times daily as needed for Diarrhea. Max Daily Amount: 3 Tabs. 45 Tab 0    fluticasone-salmeterol (ADVAIR) 250-50 mcg/dose diskus inhaler Take 1 Puff by inhalation two (2) times a day. MUST rinse mouth after use 2 Inhaler 3    budesonide (RHINOCORT AQUA) 32 mcg/actuation nasal spray       magnesium 250 mg tab Take 400 mg by mouth daily.  omega-3 fatty acids-vitamin e (FISH OIL) 1,000 mg Cap Take 3 capsules by mouth daily.  cholecalciferol, vitamin d3, (VITAMIN D) 1,000 unit tablet Take 2,000 Units by mouth daily.  olmesartan (BENICAR) 40 mg tablet Take 1 Tab by mouth daily. 90 Tab 0    AWAIS PEN NEEDLE 32 gauge x 5/32\" ndle       SAXENDA 3 mg/0.5 mL (18 mg/3 mL) pen       diazePAM (VALIUM) 2 mg tablet       hydroCHLOROthiazide (HYDRODIURIL) 12.5 mg tablet TAKE 1 TABLET DAILY 90 Tab 2    cloNIDine HCl (CATAPRES) 0.1 mg tablet TAKE 1 TABLET TWICE A  Tab 0    metFORMIN ER (GLUCOPHAGE XR) 500 mg tablet       buPROPion SR (WELLBUTRIN SR) 150 mg SR tablet Take 1 Tab by mouth daily.  180 Tab 2     Allergies   Allergen Reactions    Codeine Nausea and Vomiting    Erythromycin Rash    Hydrocodone Rash       Objective:  Visit Vitals  /73 (BP 1 Location: Left arm, BP Patient Position: Sitting)   Pulse (!) 50   Temp 97.6 °F (36.4 °C) (Oral)   Resp 12   Ht 5' 3\" (1.6 m)   Wt 208 lb 9.6 oz (94.6 kg)   SpO2 97%   BMI 36.95 kg/m²     Physical Exam:   General appearance - alert, well appearing, and in no distress and overweight  Mental status - alert, oriented to person, place, and time  EYE-PATRICK, EOMI, no foreign bodies, visual acuity normal both eyes, cerumen left ear  ENT-ENT exam normal, no neck nodes or sinus tenderness  Nose - normal and patent, no erythema, discharge or polyps, enlarged nasal turbinates  Mouth - mucous membranes moist, pharynx normal without lesions  Neck - supple, no significant adenopathy   Chest - clear to auscultation, no wheezes, rales or rhonchi, symmetric air entry   Heart - normal rate, regular rhythm, normal S1, S2, no murmurs, rubs, clicks or gallops   Abdomen - soft, nontender, nondistended, no masses or organomegaly  Lymph- no adenopathy palpable  Ext-peripheral pulses normal, no pedal edema, no clubbing or cyanosis  Skin-Warm and dry. no hyperpigmentation, vitiligo, or suspicious lesions  Neuro -alert, oriented, normal speech, no focal findings or movement disorder noted  Neck-normal C-spine, no tenderness, full ROM without pain          Labs: cmp reviewed with pt from January      Prevention    Cardiovascular profile  Family hx  Exercising:  Walking x 10K, will consider weights  Blood pressure:  Health healthy diet:  Diabetes:  Cholesterol:  Renal function:      Cancer risk profile  Mammogram 10/15, 11/2018  Lung  Colonoscopy 11/15  Skin nonhealing in 2 weeks none, Dr. Tyler Villa abnormal bleeding/discharge/abd pain/pressure hysterectomy      Thyroid sx  constipation    Osteopenia prevention stopped due to kidney stone  BMD  Family hx of kidney stones  Calcium 1000mg/day yes  Vitamin D 800iu/day yes    Mental health scale: 8-9/10, 7-8/10 , 6-7/10 sad since half-way  Depression  Anxiety  Sleep # of hours:  Energy Level:        Immunizations  TDAP  Pneumonia vaccine pneumovax  Flu vaccine  Shingles vaccine  HPV  Shingles vaccine not a bad outbreak        Diagnoses and all orders for this visit:    1. Well adult exam  Patient presents in overall very good health.   She has lost close to 50 pounds intentionally. Her energy level is good. -     thyroid, Pork, (ARMOUR THYROID) 90 mg tablet; Take 1 Tab by mouth daily. Indications: hypothyroidism  -     METABOLIC PANEL, COMPREHENSIVE  -     LIPID PANEL  -     pneumococcal 13 nicola conj dip (PREVNAR-13) 0.5 mL syrg injection; 0.5 mL by IntraMUSCular route once for 1 dose.  -     varicella-zoster recombinant, PF, (SHINGRIX, PF,) 50 mcg/0.5 mL susr injection; 0.5 mL by IntraMUSCular route once for 1 dose. Indications: Prevention of Shingles  -     meloxicam (MOBIC) 15 mg tablet; Take 1 Tab by mouth daily. DR. Carmela Shane    2. Essential hypertension  Her blood pressure on recheck was 140/86. This was consistent with what she usually gets at home. We will continue to monitor her as she continues to lose weight. 3. BMI 36.0-36.9,adult  As noted she has lost intentionally 50 pounds  We will send her labs to Dr. Lilibeth hutchinson's office  -     HEMOGLOBIN A1C WITH EAG  -     T4, FREE  -     TSH 3RD GENERATION    4. Vitamin D deficiency  Replete as needed  We will check calcium vitamin D  She may need a bone density  -     VITAMIN D, 25 HYDROXY    5. B12 deficiency due to diet  Patient has been on metformin for several months  -     VITAMIN B12 & FOLATE    6. Diarrhea, unspecified type  Discussed with patient and she will stop magnesium  -     CBC W/O DIFF  -     diphenoxylate-atropine (LOMOTIL) 2.5-0.025 mg per tablet; Take 1 Tab by mouth three (3) times daily as needed for Diarrhea. Max Daily Amount: 3 Tabs. Memory loss  Will monitor  We will check B12 and folate and CBC      Aside from patient's annual preventive visit I spent an additional 15 minutes with this patient and greater than 50% of the time was spent in management and counseling with regards to her diarrhea symptoms, memory loss and blood pressure. She will follow-up in 1 year or earlier. She we will look at her calcium and vitamin D she may need a bone density.

## 2019-08-08 LAB
25(OH)D3+25(OH)D2 SERPL-MCNC: 29.4 NG/ML (ref 30–100)
ALBUMIN SERPL-MCNC: 4.3 G/DL (ref 3.6–4.8)
ALBUMIN/GLOB SERPL: 2.4 {RATIO} (ref 1.2–2.2)
ALP SERPL-CCNC: 53 IU/L (ref 39–117)
ALT SERPL-CCNC: 23 IU/L (ref 0–32)
AST SERPL-CCNC: 17 IU/L (ref 0–40)
BILIRUB SERPL-MCNC: 0.5 MG/DL (ref 0–1.2)
BUN SERPL-MCNC: 20 MG/DL (ref 8–27)
BUN/CREAT SERPL: 28 (ref 12–28)
CALCIUM SERPL-MCNC: 10.2 MG/DL (ref 8.7–10.3)
CHLORIDE SERPL-SCNC: 104 MMOL/L (ref 96–106)
CHOLEST SERPL-MCNC: 188 MG/DL (ref 100–199)
CO2 SERPL-SCNC: 22 MMOL/L (ref 20–29)
CREAT SERPL-MCNC: 0.72 MG/DL (ref 0.57–1)
ERYTHROCYTE [DISTWIDTH] IN BLOOD BY AUTOMATED COUNT: 12 % (ref 12.3–15.4)
EST. AVERAGE GLUCOSE BLD GHB EST-MCNC: 97 MG/DL
FOLATE SERPL-MCNC: 4.9 NG/ML
GLOBULIN SER CALC-MCNC: 1.8 G/DL (ref 1.5–4.5)
GLUCOSE SERPL-MCNC: 110 MG/DL (ref 65–99)
HBA1C MFR BLD: 5 % (ref 4.8–5.6)
HCT VFR BLD AUTO: 37.8 % (ref 34–46.6)
HDLC SERPL-MCNC: 48 MG/DL
HGB BLD-MCNC: 12.3 G/DL (ref 11.1–15.9)
INTERPRETATION, 910389: NORMAL
LDLC SERPL CALC-MCNC: 118 MG/DL (ref 0–99)
MCH RBC QN AUTO: 28.7 PG (ref 26.6–33)
MCHC RBC AUTO-ENTMCNC: 32.5 G/DL (ref 31.5–35.7)
MCV RBC AUTO: 88 FL (ref 79–97)
PLATELET # BLD AUTO: 169 X10E3/UL (ref 150–450)
POTASSIUM SERPL-SCNC: 4.4 MMOL/L (ref 3.5–5.2)
PROT SERPL-MCNC: 6.1 G/DL (ref 6–8.5)
RBC # BLD AUTO: 4.28 X10E6/UL (ref 3.77–5.28)
SODIUM SERPL-SCNC: 141 MMOL/L (ref 134–144)
T4 FREE SERPL-MCNC: 1.07 NG/DL (ref 0.82–1.77)
TRIGL SERPL-MCNC: 111 MG/DL (ref 0–149)
TSH SERPL DL<=0.005 MIU/L-ACNC: 1.09 UIU/ML (ref 0.45–4.5)
VIT B12 SERPL-MCNC: <150 PG/ML (ref 232–1245)
VLDLC SERPL CALC-MCNC: 22 MG/DL (ref 5–40)
WBC # BLD AUTO: 4.8 X10E3/UL (ref 3.4–10.8)

## 2019-08-11 NOTE — PROGRESS NOTES
Please give patient my message about having her make a follow-up appointment so I can give her a B12 injection.

## 2019-08-13 ENCOUNTER — OFFICE VISIT (OUTPATIENT)
Dept: INTERNAL MEDICINE CLINIC | Age: 64
End: 2019-08-13

## 2019-08-13 VITALS
TEMPERATURE: 98.1 F | WEIGHT: 209.6 LBS | HEIGHT: 63 IN | DIASTOLIC BLOOD PRESSURE: 67 MMHG | SYSTOLIC BLOOD PRESSURE: 156 MMHG | HEART RATE: 51 BPM | OXYGEN SATURATION: 95 % | BODY MASS INDEX: 37.14 KG/M2 | RESPIRATION RATE: 12 BRPM

## 2019-08-13 DIAGNOSIS — E53.8 VITAMIN B12 DEFICIENCY: Primary | ICD-10-CM

## 2019-08-13 DIAGNOSIS — I10 ESSENTIAL HYPERTENSION: ICD-10-CM

## 2019-08-13 RX ORDER — CYANOCOBALAMIN 1000 UG/ML
1000 INJECTION, SOLUTION INTRAMUSCULAR; SUBCUTANEOUS ONCE
Qty: 1 ML | Refills: 0
Start: 2019-08-13 | End: 2020-04-08 | Stop reason: ALTCHOICE

## 2019-08-13 RX ORDER — LANOLIN ALCOHOL/MO/W.PET/CERES
1000 CREAM (GRAM) TOPICAL DAILY
Qty: 60 TAB | Refills: 0 | Status: SHIPPED | OUTPATIENT
Start: 2019-08-13 | End: 2019-09-10 | Stop reason: SDUPTHER

## 2019-08-13 RX ORDER — GLUCOSAMINE/CHONDR SU A SOD 750-600 MG
TABLET ORAL
COMMUNITY
End: 2020-09-10

## 2019-08-13 NOTE — PROGRESS NOTES
Trisha Eaton is a 71 y.o. female and presents with Injection B12 and Results (review lab results)  . Subjective:    Patient presents for follow-up to discuss labs. B12 deficiency  Patient reports that she was on metformin for several years when seeing [place] [facility]. She does eat meat. She does not take a multivitamin. She does report on last visit some memory issues. bmi 36.9  She has lost close to 50 pounds with Kimberly Jung /Dr. Anh Moore with [place] [facility]  Energy level good   She has been off metformin for several weeks. Memory issues  She notes for at least 6 months  Stable not progressing  She does not take B12 or multivitamins. Subjective:   Trisha Eaton is a 71 y.o. female with hypertension. Hypertension ROS: taking medications as instructed, no medication side effects noted, no TIA's, no chest pain on exertion, no dyspnea on exertion, no swelling of ankles. New concerns none, she has not started her Benicar yet as she was still using her irbesartan        incontinece  Working with dr. Denice Blancas  Now candidate for botox and approved  She is no longer on stool softener and bm not ideal        Review of Systems  Constitutional: negative for fevers, chills, anorexia and weight loss  Eyes:   negative for visual disturbance and irritation  ENT:   negative for tinnitus,sore throat,nasal congestion,ear pains. hoarseness  Respiratory:  negative for cough, hemoptysis, dyspnea,wheezing  CV:   negative for chest pain, palpitations, lower extremity edema  GI:   negative for nausea, vomiting, diarrhea, abdominal pain,melena  Endo:               negative for polyuria,polydipsia,polyphagia,heat intolerance  Genitourinary: negative for frequency, dysuria and hematuria  Integument:  negative for rash and pruritus  Hematologic:  negative for easy bruising and gum/nose bleeding  Musculoskel: negative for myalgias, arthralgias, back pain, muscle weakness, joint pain  Neurological: negative for headaches, dizziness, vertigo, memory problems and gait   Behavl/Psych: negative for feelings of anxiety, depression, mood changes    Past Medical History:   Diagnosis Date    Arthritis     total knee    Endometriosis 1/30/2009    resolved with hysterectomy    Hypertension     Ill-defined condition     IBS    Morbid obesity ([facility] [address]     Precancerous skin lesion     wang    Urge incontinence 08/25/2010    abdullahi michell; botox     Past Surgical History:   Procedure Laterality Date    HX APPENDECTOMY  2010    HX COLONOSCOPY  11/08/2015       10 years again, dr. arreola    HX GYN      c section times 3    HX GYN      ectopic r fallopian tube resected    HX HYSTERECTOMY  1998    HX ORTHOPAEDIC  2006    right wrist fx    HX ORTHOPAEDIC  2010    left knee replacement     HX ORTHOPAEDIC Right 10/19/2017    Elbow     HX UROLOGICAL  2014,   12/3/15    Urethral sling      Social History     Socioeconomic History    Marital status:      Spouse name: Not on file    Number of children: Not on file    Years of education: Not on file    Highest education level: Not on file   Tobacco Use    Smoking status: Never Smoker    Smokeless tobacco: Never Used   Substance and Sexual Activity    Alcohol use: Yes     Alcohol/week: 1.7 standard drinks     Types: 2 Glasses of wine per week     Comment: on saturedays    Drug use: No    Sexual activity: Yes     Partners: Male     Comment: 39 years    Social History Narrative    Retired [employer].   She does the Financial, Administative very busy in the nursing    Enjoys it, not stressfull, flexibility            3 sons healthy:  Inflammatory Bowel 1 son, Cdiff         Family History   Problem Relation Age of Onset    Cancer Father         prostate ca    Cancer Sister         lung ca 55    Heart Disease Mother 68        a fib    Prostate Cancer Brother      Current Outpatient Medications   Medication Sig Dispense Refill    Biotin 2,500 mcg cap Take  by mouth.  cyanocobalamin (VITAMIN B12) 500 mcg tablet Take 2 Tabs by mouth daily. 60 Tab 0    cyanocobalamin (VITAMIN B-12) 1,000 mcg/mL injection 1 mL by IntraMUSCular route once for 1 dose. 1 mL 0    buPROPion XL (WELLBUTRIN XL) 300 mg XL tablet Take 300 mg by mouth every morning.  irbesartan (AVAPRO) 300 mg tablet Take 300 mg by mouth nightly.  thyroid, Pork, (ARMOUR THYROID) 90 mg tablet Take 1 Tab by mouth daily. Indications: hypothyroidism 90 Tab 1    meloxicam (MOBIC) 15 mg tablet Take 1 Tab by mouth daily. DR. Ana Laura Raymundo 30 Tab 3    diphenoxylate-atropine (LOMOTIL) 2.5-0.025 mg per tablet Take 1 Tab by mouth three (3) times daily as needed for Diarrhea. Max Daily Amount: 3 Tabs. 45 Tab 0    montelukast (SINGULAIR) 10 mg tablet Take 1 Tab by mouth daily. 90 Tab 1    hydrocortisone (ANUSOL-HC) 2.5 % rectal cream Insert  into rectum four (4) times daily. 30 g 2    ESTRACE 0.01 % (0.1 mg/gram) vaginal cream       diazePAM (VALIUM) 2 mg tablet       fluticasone-salmeterol (ADVAIR) 250-50 mcg/dose diskus inhaler Take 1 Puff by inhalation two (2) times a day. MUST rinse mouth after use 2 Inhaler 3    budesonide (RHINOCORT AQUA) 32 mcg/actuation nasal spray       omega-3 fatty acids-vitamin e (FISH OIL) 1,000 mg Cap Take 3 capsules by mouth daily.  cholecalciferol, vitamin d3, (VITAMIN D) 1,000 unit tablet Take 2,000 Units by mouth daily.        Allergies   Allergen Reactions    Codeine Nausea and Vomiting    Erythromycin Rash    Hydrocodone Rash       Objective:  Visit Vitals  /67 (BP 1 Location: Left arm, BP Patient Position: Sitting)   Pulse (!) 51   Temp 98.1 °F (36.7 °C) (Oral)   Resp 12   Ht 5' 3\" (1.6 m)   Wt 209 lb 9.6 oz (95.1 kg)   SpO2 95%   BMI 37.13 kg/m²     Physical Exam:   General appearance - alert, well appearing, and in no distress and overweight  Mental status - alert, oriented to person, place, and time  EYE-PATRICK, EOMI, no foreign bodies, visual acuity normal both eyes, cerumen left ear  ENT-ENT exam normal, no neck nodes or sinus tenderness  Nose - normal and patent, no erythema, discharge or polyps, enlarged nasal turbinates  Mouth - mucous membranes moist, pharynx normal without lesions  Neck - supple, no significant adenopathy   Chest - clear to auscultation, no wheezes, rales or rhonchi, symmetric air entry   Heart - normal rate, regular rhythm, normal S1, S2, no murmurs, rubs, clicks or gallops   Abdomen - soft, nontender, nondistended, no masses or organomegaly  Lymph- no adenopathy palpable  Ext-peripheral pulses normal, no pedal edema, no clubbing or cyanosis  Skin-Warm and dry. no hyperpigmentation, vitiligo, or suspicious lesions  Neuro -alert, oriented, normal speech, no focal findings or movement disorder noted  Neck-normal C-spine, no tenderness, full ROM without pain          Labs: cmp reviewed with pt from January      Prevention    Cardiovascular profile  Family hx  Exercising:  Walking x 10K, will consider weights  Blood pressure:  Health healthy diet:  Diabetes:  Cholesterol:  Renal function:      Cancer risk profile  Mammogram 10/15, 11/2018  Lung  Colonoscopy 11/15  Skin nonhealing in 2 weeks none, Dr. Dax Au abnormal bleeding/discharge/abd pain/pressure hysterectomy      Thyroid sx  constipation    Osteopenia prevention stopped due to kidney stone  BMD  Family hx of kidney stones  Calcium 1000mg/day yes  Vitamin D 800iu/day yes    Mental health scale: 8-9/10, 7-8/10 , 6-7/10 sad since shelter  Depression  Anxiety  Sleep # of hours:  Energy Level:        Immunizations  TDAP  Pneumonia vaccine pneumovax  Flu vaccine  Shingles vaccine  HPV  Shingles vaccine not a bad outbreak        Diagnoses and all orders for this visit:    1. Vitamin B12 deficiency  Patient with B12 deficiency. Discussed with patient treatment for this with weekly injections until she normalizes.   Patient will have a B12 injection today and will take thousand micrograms daily. She will recheck her labs in 1 week and then follow-up with me the following day. At this may be the cause for some of her fatigue and memory issues. Discussed with her trying to find the underlying cause and she was on metformin for a lengthy time. She is no longer on this metformin. -     VITAMIN B12  -     METHYLMALONIC ACID  -     CELIAC ANTIBODY PROFILE  -     cyanocobalamin (VITAMIN B12) 500 mcg tablet; Take 2 Tabs by mouth daily. -     VITAMIN B12 INJECTION  -     THER/PROPH/DIAG INJECTION, SUBCUT/IM  -     cyanocobalamin (VITAMIN B-12) 1,000 mcg/mL injection; 1 mL by IntraMUSCular route once for 1 dose. 2. Essential hypertension  Not controlled  She will transition to Benicar 40 mg p.o. Daily    She will follow-up with me in 1 week I will recheck her blood pressure and we will discuss her B12 lab again.

## 2019-08-21 ENCOUNTER — OFFICE VISIT (OUTPATIENT)
Dept: INTERNAL MEDICINE CLINIC | Age: 64
End: 2019-08-21

## 2019-08-21 VITALS
BODY MASS INDEX: 37.21 KG/M2 | WEIGHT: 210 LBS | HEIGHT: 63 IN | RESPIRATION RATE: 18 BRPM | DIASTOLIC BLOOD PRESSURE: 74 MMHG | OXYGEN SATURATION: 95 % | HEART RATE: 54 BPM | TEMPERATURE: 98.4 F | SYSTOLIC BLOOD PRESSURE: 132 MMHG

## 2019-08-21 DIAGNOSIS — E53.8 B12 DEFICIENCY: Primary | ICD-10-CM

## 2019-08-21 DIAGNOSIS — I10 ESSENTIAL HYPERTENSION: ICD-10-CM

## 2019-08-21 DIAGNOSIS — Z78.0 POST-MENOPAUSAL: ICD-10-CM

## 2019-08-21 RX ORDER — CYANOCOBALAMIN 1000 UG/ML
1000 INJECTION, SOLUTION INTRAMUSCULAR; SUBCUTANEOUS ONCE
Qty: 1 ML | Refills: 0
Start: 2019-08-21 | End: 2020-04-08 | Stop reason: ALTCHOICE

## 2019-08-21 RX ORDER — HYDROCHLOROTHIAZIDE 12.5 MG/1
12.5 TABLET ORAL DAILY
Qty: 90 TAB | Refills: 1 | Status: SHIPPED | OUTPATIENT
Start: 2019-08-21 | End: 2019-09-25 | Stop reason: SDUPTHER

## 2019-08-21 NOTE — PROGRESS NOTES
Diana Plasencia is a 71 y.o. female and presents with Vitamin B12 Deficiency  . Subjective:    Patient presents for follow-up to discuss labs. Her B12 and MMA are not back yet. B12 deficiency  Patient reports that she was on metformin for several years when seeing [facility]. She does eat meat. She does not take a multivitamin. She does report on last visit some memory issues. She had one B12 injection last week and she is on oral B12. She reports she does not feel significantly better. Her energy level is the same. Her memory feels the same. bmi 36.9  She has lost close to 50 pounds with [facility] /Dr. Juan Gee with [facility]  Energy level good   She is going to go back on her Saxenda. Memory issues  She notes for at least 6 months  Stable not progressing  She does not take B12 or multivitamins. Subjective:   Diana Plasencia is a 71 y.o. female with hypertension. Hypertension ROS: taking medications as instructed, no medication side effects noted, no TIA's, no chest pain on exertion, no dyspnea on exertion, no swelling of ankles. New concerns she has been on Benicar for about 6 days. She is not taking her blood pressure at home consistently. She has been on hydrochlorothiazide before in the past.        incontinece  Working with dr. Dennise Rangel  Now candidate for botox and approved  She is no longer on stool softener and bm not ideal        Review of Systems  Constitutional: negative for fevers, chills, anorexia and weight loss  Eyes:   negative for visual disturbance and irritation  ENT:   negative for tinnitus,sore throat,nasal congestion,ear pains. hoarseness  Respiratory:  negative for cough, hemoptysis, dyspnea,wheezing  CV:   negative for chest pain, palpitations, lower extremity edema  GI:   negative for nausea, vomiting, diarrhea, abdominal pain,melena  Endo:               negative for polyuria,polydipsia,polyphagia,heat intolerance  Genitourinary: negative for frequency, dysuria and hematuria  Integument:  negative for rash and pruritus  Hematologic:  negative for easy bruising and gum/nose bleeding  Musculoskel: negative for myalgias, arthralgias, back pain, muscle weakness, joint pain  Neurological:  negative for headaches, dizziness, vertigo, memory problems and gait   Behavl/Psych: negative for feelings of anxiety, depression, mood changes    Past Medical History:   Diagnosis Date    Arthritis     total knee    Endometriosis 1/30/2009    resolved with hysterectomy    Hypertension     Ill-defined condition     IBS    Morbid obesity ([facility] [address]     Precancerous skin lesion     wang    Urge incontinence 08/25/2010    abdullahi michell; botox     Past Surgical History:   Procedure Laterality Date    HX APPENDECTOMY  2010    HX COLONOSCOPY  11/08/2015       10 years again, dr. arreola    HX GYN      c section times 3    HX GYN      ectopic r fallopian tube resected    HX HYSTERECTOMY  1998    HX ORTHOPAEDIC  2006    right wrist fx    HX ORTHOPAEDIC  2010    left knee replacement     HX ORTHOPAEDIC Right 10/19/2017    Elbow     HX UROLOGICAL  2014,   12/3/15    Urethral sling      Social History     Socioeconomic History    Marital status:      Spouse name: Not on file    Number of children: Not on file    Years of education: Not on file    Highest education level: Not on file   Tobacco Use    Smoking status: Never Smoker    Smokeless tobacco: Never Used   Substance and Sexual Activity    Alcohol use: Yes     Alcohol/week: 1.7 standard drinks     Types: 2 Glasses of wine per week     Comment: on saturedays    Drug use: No    Sexual activity: Yes     Partners: Male     Comment: 39 years    Social History Narrative    Retired [employer].   She does the Financial, Administative very busy in the nursing    Enjoys it, not stressfull, flexibility            3 sons healthy:  Inflammatory Bowel 1 son, Cdiff         Family History   Problem Relation Age of Onset   [place] Darryl Cancer Father         prostate ca    Cancer Sister         lung ca 55    Heart Disease Mother 68        a fib    Prostate Cancer Brother      Current Outpatient Medications   Medication Sig Dispense Refill    cyanocobalamin (VITAMIN B-12) 1,000 mcg/mL injection 1 mL by IntraMUSCular route once for 1 dose. 1 mL 0    Biotin 2,500 mcg cap Take  by mouth.  cyanocobalamin (VITAMIN B12) 500 mcg tablet Take 2 Tabs by mouth daily. 60 Tab 0    buPROPion XL (WELLBUTRIN XL) 300 mg XL tablet Take 300 mg by mouth every morning.  irbesartan (AVAPRO) 300 mg tablet Take 300 mg by mouth nightly.  thyroid, Pork, (ARMOUR THYROID) 90 mg tablet Take 1 Tab by mouth daily. Indications: hypothyroidism 90 Tab 1    meloxicam (MOBIC) 15 mg tablet Take 1 Tab by mouth daily. DR. Oralia Herrmann 30 Tab 3    diphenoxylate-atropine (LOMOTIL) 2.5-0.025 mg per tablet Take 1 Tab by mouth three (3) times daily as needed for Diarrhea. Max Daily Amount: 3 Tabs. 45 Tab 0    montelukast (SINGULAIR) 10 mg tablet Take 1 Tab by mouth daily. 90 Tab 1    hydrocortisone (ANUSOL-HC) 2.5 % rectal cream Insert  into rectum four (4) times daily. 30 g 2    ESTRACE 0.01 % (0.1 mg/gram) vaginal cream       diazePAM (VALIUM) 2 mg tablet Only for procedures      fluticasone-salmeterol (ADVAIR) 250-50 mcg/dose diskus inhaler Take 1 Puff by inhalation two (2) times a day. MUST rinse mouth after use 2 Inhaler 3    budesonide (RHINOCORT AQUA) 32 mcg/actuation nasal spray       omega-3 fatty acids-vitamin e (FISH OIL) 1,000 mg Cap Take 3 capsules by mouth daily.  cholecalciferol, vitamin d3, (VITAMIN D) 1,000 unit tablet Take 2,000 Units by mouth daily.  cyanocobalamin (VITAMIN B-12) 1,000 mcg/mL injection 1 mL by IntraMUSCular route once for 1 dose.  1 mL 0     Allergies   Allergen Reactions    Codeine Nausea and Vomiting    Erythromycin Rash    Hydrocodone Rash Objective:  Visit Vitals  /74 (BP 1 Location: Left arm, BP Patient Position: Sitting)   Pulse (!) 54   Temp 98.4 °F (36.9 °C) (Oral)   Resp 18   Ht 5' 3\" (1.6 m)   Wt 210 lb (95.3 kg)   SpO2 95%   BMI 37.20 kg/m²     Physical Exam:   General appearance - alert, well appearing, and in no distress and overweight  Mental status - alert, oriented to person, place, and time  EYE-PATRICK, EOMI, no foreign bodies, visual acuity normal both eyes, cerumen left ear  ENT-ENT exam normal, no neck nodes or sinus tenderness  Nose - normal and patent, no erythema, discharge or polyps, enlarged nasal turbinates  Mouth - mucous membranes moist, pharynx normal without lesions  Neck - supple, no significant adenopathy   Chest - clear to auscultation, no wheezes, rales or rhonchi, symmetric air entry   Heart - normal rate, regular rhythm, normal S1, S2, no murmurs, rubs, clicks or gallops   Abdomen - soft, nontender, nondistended, no masses or organomegaly  Lymph- no adenopathy palpable  Ext-peripheral pulses normal, no pedal edema, no clubbing or cyanosis  Skin-Warm and dry.  no hyperpigmentation, vitiligo, or suspicious lesions  Neuro -alert, oriented, normal speech, no focal findings or movement disorder noted  Neck-normal C-spine, no tenderness, full ROM without pain          Labs: cmp reviewed with pt from January      Prevention    Cardiovascular profile  Family hx  Exercising:  Walking x 10K, will consider weights  Blood pressure:  Health healthy diet:  Diabetes:  Cholesterol:  Renal function:      Cancer risk profile  Mammogram 10/15, 11/2018  Lung  Colonoscopy 11/15  Skin nonhealing in 2 weeks none, Dr. Cem Hendrix abnormal bleeding/discharge/abd pain/pressure hysterectomy      Thyroid sx  constipation    Osteopenia prevention stopped due to kidney stone  BMD  Family hx of kidney stones  Calcium 1000mg/day yes  Vitamin D 800iu/day yes    Mental health scale: 8-9/10, 7-8/10 , 6-7/10 sad since detention  Depression  Anxiety  Sleep # of hours:  Energy Level:        Immunizations  TDAP  Pneumonia vaccine pneumovax  Flu vaccine  Shingles vaccine  HPV  Shingles vaccine not a bad outbreak        Diagnoses and all orders for this visit:    1. B12 deficiency  Patient will get an injection today  She will continue oral B12  Depending on her labs we can see if she is absorbing if her MMA is elevated and if her B12 levels closer to range. Would like to have her B12 closer to 500  -     VITAMIN B12 INJECTION  -     THER/PROPH/DIAG INJECTION, SUBCUT/IM  -     cyanocobalamin (VITAMIN B-12) 1,000 mcg/mL injection; 1 mL by IntraMUSCular route once for 1 dose. 2. Post-menopausal  We will assess for need for Fosamax  -     DEXA BONE DENSITY STUDY AXIAL; Future    3. Essential hypertension  Not optimally controlled but has been on Benicar for just a few days. If not improving over the next week she will add low-dose hydrochlorothiazide. -     hydroCHLOROthiazide (HYDRODIURIL) 12.5 mg tablet; Take 1 Tab by mouth daily. With next visit: We will check out her labs and depending on her B12 levels arrange for her next appointment.

## 2019-08-22 LAB
GLIADIN PEPTIDE IGA SER-ACNC: 14 UNITS (ref 0–19)
GLIADIN PEPTIDE IGG SER-ACNC: 2 UNITS (ref 0–19)
IGA SERPL-MCNC: 107 MG/DL (ref 87–352)
Lab: NORMAL
METHYLMALONATE SERPL-SCNC: 197 NMOL/L (ref 0–378)
TTG IGA SER-ACNC: <2 U/ML (ref 0–3)
TTG IGG SER-ACNC: <2 U/ML (ref 0–5)
VIT B12 SERPL-MCNC: 385 PG/ML (ref 232–1245)

## 2019-08-24 DIAGNOSIS — E53.8 B12 DEFICIENCY: Primary | ICD-10-CM

## 2019-08-29 ENCOUNTER — TELEPHONE (OUTPATIENT)
Dept: INTERNAL MEDICINE CLINIC | Age: 64
End: 2019-08-29

## 2019-08-29 NOTE — TELEPHONE ENCOUNTER
[facility] [address] [phone]    Pharmacy called to get a updated medication list for patient. Stated that they several medications on file and unsure if patient is taking.  Pharmacy request to speak with nurse regarding.     [phone]  Ref# [id number]

## 2019-09-03 NOTE — TELEPHONE ENCOUNTER
Returned call to pharmacy. They were verifying the pt was taking Irbesartan and not using Olmesartan any longer.

## 2019-09-10 ENCOUNTER — TELEPHONE (OUTPATIENT)
Dept: INTERNAL MEDICINE CLINIC | Age: 64
End: 2019-09-10

## 2019-09-10 ENCOUNTER — OFFICE VISIT (OUTPATIENT)
Dept: INTERNAL MEDICINE CLINIC | Age: 64
End: 2019-09-10

## 2019-09-10 VITALS
HEIGHT: 63 IN | DIASTOLIC BLOOD PRESSURE: 80 MMHG | BODY MASS INDEX: 37.74 KG/M2 | SYSTOLIC BLOOD PRESSURE: 148 MMHG | RESPIRATION RATE: 16 BRPM | OXYGEN SATURATION: 98 % | HEART RATE: 59 BPM | WEIGHT: 213 LBS | TEMPERATURE: 98.6 F

## 2019-09-10 DIAGNOSIS — G57.01 PIRIFORMIS SYNDROME OF RIGHT SIDE: Primary | ICD-10-CM

## 2019-09-10 DIAGNOSIS — E53.8 VITAMIN B12 DEFICIENCY: ICD-10-CM

## 2019-09-10 RX ORDER — LANOLIN ALCOHOL/MO/W.PET/CERES
CREAM (GRAM) TOPICAL
Qty: 60 TAB | Refills: 0 | Status: SHIPPED | OUTPATIENT
Start: 2019-09-10 | End: 2019-10-15 | Stop reason: SDUPTHER

## 2019-09-10 RX ORDER — PREDNISONE 10 MG/1
10 TABLET ORAL SEE ADMIN INSTRUCTIONS
Qty: 21 TAB | Refills: 0 | Status: SHIPPED | OUTPATIENT
Start: 2019-09-10 | End: 2020-02-28 | Stop reason: ALTCHOICE

## 2019-09-10 NOTE — TELEPHONE ENCOUNTER
Oma Estrada  P ac Front Office Pool         Pt calling stating that the medication she was prescribed at her visit today is not covered by her insurance and she would like to know if something else that is covered could be sent to the pharmacy on file.  Best contact [phone].

## 2019-09-10 NOTE — PATIENT INSTRUCTIONS
Piriformis Syndrome: Exercises  Introduction  Here are some examples of exercises for you to try. The exercises may be suggested for a condition or for rehabilitation. Start each exercise slowly. Ease off the exercises if you start to have pain. You will be told when to start these exercises and which ones will work best for you. How to do the exercises  Hip rotator stretch    1. Lie on your back with both knees bent and your feet flat on the floor. 2. Put the ankle of your affected leg on your opposite thigh near your knee. 3. Use your hand to gently push your knee (on your affected leg) away from your body until you feel a gentle stretch around your hip. 4. Hold the stretch for 15 to 30 seconds. 5. Repeat 2 to 4 times. 6. Switch legs and repeat steps 1 through 5. Piriformis stretch    1. Lie on your back with your legs straight. 2. Lift your affected leg and bend your knee. With your opposite hand, reach across your body, and then gently pull your knee toward your opposite shoulder. 3. Hold the stretch for 15 to 30 seconds. 4. Repeat with your other leg. 5. Repeat 2 to 4 times on each side. Lower abdominal strengthening    1. Lie on your back with your knees bent and your feet flat on the floor. 2. Tighten your belly muscles by pulling your belly button in toward your spine. 3. Lift one foot off the floor and bring your knee toward your chest, so that your knee is straight above your hip and your leg is bent like the letter \"L. \"  4. Lift the other knee up to the same position. 5. Lower one leg at a time to the starting position. 6. Keep alternating legs until you have lifted each leg 8 to 12 times. 7. Be sure to keep your belly muscles tight and your back still as you are moving your legs. Be sure to breathe normally. Follow-up care is a key part of your treatment and safety. Be sure to make and go to all appointments, and call your doctor if you are having problems.  It's also a good idea to know your test results and keep a list of the medicines you take. Current as of: September 20, 2018  Content Version: 12.1  © 6672-5827 [employer]. Care instructions adapted under license by [employer] (which disclaims liability or warranty for this information). If you have questions about a medical condition or this instruction, always ask your healthcare professional. [address] any warranty or liability for your use of this information. Piriformis Syndrome: Care Instructions  Your Care Instructions    The piriformis muscle is deep under your rear end (buttock). One end of the muscle connects deep inside the pelvic area, and the other end attaches to the top of the thighbone. This muscle can press on the sciatic nerve that runs from your spine down your leg. When this happens, you may have pain, numbness, and tingling in the buttock and down the back of your leg. This is called piriformis syndrome. The pain may get worse when you sit for a long time or climb stairs. Also, you may be more likely to develop piriformis syndrome if you run or walk often. Your doctor will check for other causes of your pain before treating this syndrome. Treatment may include stretching exercises, massage, and medicine for the pain and swelling. If these do not help, you may get a shot of steroid medicine. Until the pain is gone, you may need to rest the muscle and limit activities like running. Exercises and a change in how you move and sit may be enough to stop the pressure on the nerve. Follow-up care is a key part of your treatment and safety. Be sure to make and go to all appointments, and call your doctor if you are having problems. It's also a good idea to know your test results and keep a list of the medicines you take. How can you care for yourself at home?   · If your doctor thinks that strenuous exercise is causing your problem, stop or cut back on activities such as running. You may find swimming to be a good exercise for a while. · Stretch the piriformis muscle. ? Lie on your back. ? Bend one leg at the knee and keep the other leg flat on the ground. ? Raise your bent knee up and then move it across your body. Hold the outside of the knee with the opposite hand. ? Gently pull the knee with your hand toward the opposite shoulder. ? Hold the stretch for at least 15 to 30 seconds. Switch legs. ? Do the stretch several times each day. · Massage the muscle to relieve pressure. ? Sit on the floor. Lean to one side so that the hip on your sore side is off the ground. Put a tennis ball under your buttock on that side. ? As you put weight onto the tennis ball, you may find spots that are especially sore. Move gently so that the tennis ball gently massages each of the sore spots. · Use ice or heat to help reduce pain. Put ice or a cold pack or a heating pad set on low or a warm cloth on the sore area for 10 to 20 minutes at a time. Put a thin cloth between the ice pack or heating pad and your skin. · Ask your doctor if you can take an over-the-counter pain medicine, such as acetaminophen (Tylenol), ibuprofen (Advil, Motrin), or naproxen (Aleve). Be safe with medicines. Read and follow all instructions on the label. · Have your doctor or a physical therapist watch how you move. You may need physical therapy or special inserts in your shoes (orthotics) to help you move in a way that does not put pressure on your nerves. When should you call for help? Watch closely for changes in your health, and be sure to contact your doctor if:    · You do not feel better after several weeks of home care.     · Your pain gets worse.     · Your leg becomes weak or numb. Where can you learn more? Go to http://jose-darvin.info/. Enter B742 in the search box to learn more about \"Piriformis Syndrome: Care Instructions. \"  Current as of: September 20, 2018  Content Version: 12.1  © 5963-5961 [employer]. Care instructions adapted under license by [employer] (which disclaims liability or warranty for this information). If you have questions about a medical condition or this instruction, always ask your healthcare professional. [address] any warranty or liability for your use of this information.

## 2019-09-10 NOTE — PROGRESS NOTES
Esmond Nyhan is a 71 y.o. female who presents today for Back Pain  . She has a history of   Patient Active Problem List   Diagnosis Code    HTN (hypertension) I10    Osteoarthritis M19.90    Nephrolithiasis N20.0    Endometriosis N80.9    Urge incontinence N39.41    Obesity, morbid ([facility] [address] E66.01   . Today patient is here for an acute visit. .     Low Back Pain:  Esmond Nyhan is a 71 y.o. female who complains of low back pain on the right for 10 day(s), is positional with bending or lifting, with radiation down the legs. Severity of pain is 7 out of 10.  numbness, tingling, weakness is not present in bilateral  leg(s)/ foot. Precipitating factors: recent heavy lifting and working on removal of door hinges. Prior history of back problems: recurrent self limited episodes of low back pain in the past.  Self treatment:  Tylenol and advil. The patient denies fevers, chills or sweats. The patient denies bowel/bladder incontinence and saddle numbness. ROS  Review of Systems   Constitutional: Negative for chills, fever and weight loss. HENT: Negative for congestion and sore throat. Eyes: Negative for blurred vision, double vision and photophobia. Respiratory: Negative for cough and shortness of breath. Cardiovascular: Negative for chest pain, palpitations and leg swelling. Gastrointestinal: Negative for abdominal pain, constipation, diarrhea, heartburn, nausea and vomiting. Genitourinary: Negative for dysuria, frequency and urgency. Musculoskeletal: Positive for back pain and joint pain. Negative for myalgias. Skin: Negative for rash. Neurological: Negative. Negative for headaches. Endo/Heme/Allergies: Does not bruise/bleed easily. Psychiatric/Behavioral: Negative for memory loss and suicidal ideas.        Visit Vitals  /80 (BP 1 Location: Left arm, BP Patient Position: Sitting)   Pulse (!) 59   Temp 98.6 °F (37 °C) (Oral)   Resp 16   Ht 5' 3\" (1.6 m)   Wt 213 lb (96.6 kg)   SpO2 98%   BMI 37.73 kg/m²       Physical Exam   Constitutional: She is oriented to person, place, and time. She appears well-developed and well-nourished. HENT:   Head: Normocephalic and atraumatic. Cardiovascular: Normal rate and regular rhythm. No murmur heard. Pulmonary/Chest: Effort normal. No respiratory distress. Musculoskeletal:        Legs:  Pain initiating were noted with radiation down the lateral side of leg. Some pain with straight leg raise. Normal range of motion of right hip. Normal strength testing of lower extremity. Normal sensation to lower extremity. Neurological: She is alert and oriented to person, place, and time. Skin: Skin is warm and dry. Psychiatric: She has a normal mood and affect. Her behavior is normal.         Current Outpatient Medications   Medication Sig    hydroCHLOROthiazide (HYDRODIURIL) 12.5 mg tablet Take 1 Tab by mouth daily. (Patient taking differently: Take 25 mg by mouth daily.)    Biotin 2,500 mcg cap Take  by mouth.  cyanocobalamin (VITAMIN B12) 500 mcg tablet Take 2 Tabs by mouth daily.  buPROPion XL (WELLBUTRIN XL) 300 mg XL tablet Take 300 mg by mouth every morning.  thyroid, Pork, (ARMOUR THYROID) 90 mg tablet Take 1 Tab by mouth daily. Indications: hypothyroidism    meloxicam (MOBIC) 15 mg tablet Take 1 Tab by mouth daily. DR. Nilsa Snyder    diphenoxylate-atropine (LOMOTIL) 2.5-0.025 mg per tablet Take 1 Tab by mouth three (3) times daily as needed for Diarrhea. Max Daily Amount: 3 Tabs.  montelukast (SINGULAIR) 10 mg tablet Take 1 Tab by mouth daily.  hydrocortisone (ANUSOL-HC) 2.5 % rectal cream Insert  into rectum four (4) times daily.  ESTRACE 0.01 % (0.1 mg/gram) vaginal cream     diazePAM (VALIUM) 2 mg tablet Only for procedures    fluticasone-salmeterol (ADVAIR) 250-50 mcg/dose diskus inhaler Take 1 Puff by inhalation two (2) times a day.  MUST rinse mouth after use    budesonide (RHINOCORT AQUA) 32 mcg/actuation nasal spray     omega-3 fatty acids-vitamin e (FISH OIL) 1,000 mg Cap Take 3 capsules by mouth daily.  cholecalciferol, vitamin d3, (VITAMIN D) 1,000 unit tablet Take 2,000 Units by mouth daily.  cyanocobalamin (VITAMIN B-12) 1,000 mcg/mL injection 1 mL by IntraMUSCular route once for 1 dose.  cyanocobalamin (VITAMIN B-12) 1,000 mcg/mL injection 1 mL by IntraMUSCular route once for 1 dose. No current facility-administered medications for this visit. Past Medical History:   Diagnosis Date    Arthritis     total knee    Endometriosis 1/30/2009    resolved with hysterectomy    Hypertension     Ill-defined condition     IBS    Morbid obesity ([facility] [address]     Precancerous skin lesion     wang    Urge incontinence 08/25/2010    abdullahi michell; botox      Past Surgical History:   Procedure Laterality Date    HX APPENDECTOMY  2010    HX COLONOSCOPY  11/08/2015       10 years again, dr. arreola    HX GYN      c section times 3    HX GYN      ectopic r fallopian tube resected    HX HYSTERECTOMY  1998    HX ORTHOPAEDIC  2006    right wrist fx    HX ORTHOPAEDIC  2010    left knee replacement     HX ORTHOPAEDIC Right 10/19/2017    Elbow     HX UROLOGICAL  2014,   12/3/15    Urethral sling       Social History     Tobacco Use    Smoking status: Never Smoker    Smokeless tobacco: Never Used   Substance Use Topics    Alcohol use: Yes     Alcohol/week: 1.7 standard drinks     Types: 2 Glasses of wine per week     Comment: on saturedays      Family History   Problem Relation Age of Onset    Cancer Father         prostate ca    Cancer Sister         lung ca 55    Heart Disease Mother 68        a fib    Prostate Cancer Brother         Allergies   Allergen Reactions    Codeine Nausea and Vomiting    Erythromycin Rash    Hydrocodone Rash        Assessment/Plan  Diagnoses and all orders for this visit:    1. Piriformis syndrome of right side -distribution consistent with piriformis syndrome. Patient has failed NSAIDs and will place on a short course of prednisone. Provided with stretches to do at home. -     predniSONE (STERAPRED DS) 10 mg dose pack; Take 1 Tab by mouth See Admin Instructions. See administration instruction per 10mg dose pack            Yohan Hagan MD  9/10/2019    This note was created with the help of speech recognition software [employer]) and may contain some 'sound alike' errors.

## 2019-09-10 NOTE — PROGRESS NOTES
Pt is in the office today for right sided low back pain that goes into her right buttock and leg. Pt states about 2 weeks ago she was taking doors off hinges and twisted the wrong way. Pt is now intermittent.

## 2019-09-10 NOTE — TELEPHONE ENCOUNTER
----- Message from Antonina Epstein sent at 9/10/2019  7:58 AM EDT -----  Regarding: Dr. Santos Favre first and last name: (((PATIENT)))    Reason for call: Back pain and to be fit in today     Callback required yes/no and why: yes, to have a appt.     Best contact number(s):[phone]    Details to clarify the request: Pt would like a call back to be fit in today for back pain      Antonina Epstein

## 2019-09-10 NOTE — TELEPHONE ENCOUNTER
PSR called patient & scheduled with DR. Payne for back pain at [facility] 9/10/19. PSR wanted to make DR. Beavers/Nurse  aware.

## 2019-09-26 DIAGNOSIS — J98.01 BRONCHOSPASM: ICD-10-CM

## 2019-09-27 RX ORDER — FLUTICASONE PROPIONATE AND SALMETEROL 250; 50 UG/1; UG/1
1 POWDER RESPIRATORY (INHALATION) 2 TIMES DAILY
Qty: 2 INHALER | Refills: 3 | Status: SHIPPED | OUTPATIENT
Start: 2019-09-27 | End: 2020-09-10

## 2019-10-14 ENCOUNTER — PATIENT MESSAGE (OUTPATIENT)
Dept: INTERNAL MEDICINE CLINIC | Age: 64
End: 2019-10-14

## 2019-10-14 DIAGNOSIS — J98.01 BRONCHOSPASM: ICD-10-CM

## 2019-10-14 RX ORDER — OLMESARTAN MEDOXOMIL 40 MG/1
TABLET ORAL
Qty: 90 TAB | Refills: 0 | Status: SHIPPED | OUTPATIENT
Start: 2019-10-14 | End: 2019-10-16 | Stop reason: ALTCHOICE

## 2019-10-15 DIAGNOSIS — I10 ESSENTIAL HYPERTENSION: ICD-10-CM

## 2019-10-15 DIAGNOSIS — E53.8 VITAMIN B12 DEFICIENCY: ICD-10-CM

## 2019-10-15 RX ORDER — HYDROCHLOROTHIAZIDE 25 MG/1
TABLET ORAL
Qty: 90 TAB | Refills: 0 | Status: SHIPPED | OUTPATIENT
Start: 2019-10-15 | End: 2020-01-15 | Stop reason: SDUPTHER

## 2019-10-15 RX ORDER — FLUTICASONE PROPIONATE AND SALMETEROL 250; 50 UG/1; UG/1
1 POWDER RESPIRATORY (INHALATION) 2 TIMES DAILY
Qty: 2 INHALER | Refills: 3 | Status: CANCELLED | OUTPATIENT
Start: 2019-10-15

## 2019-10-15 RX ORDER — FLUTICASONE PROPIONATE 50 MCG
2 SPRAY, SUSPENSION (ML) NASAL DAILY
Qty: 1 BOTTLE | Refills: 3 | Status: SHIPPED | OUTPATIENT
Start: 2019-10-15 | End: 2020-05-19

## 2019-10-15 RX ORDER — LANOLIN ALCOHOL/MO/W.PET/CERES
CREAM (GRAM) TOPICAL
Qty: 60 TAB | Refills: 0 | Status: SHIPPED | OUTPATIENT
Start: 2019-10-15 | End: 2020-09-10

## 2019-10-15 NOTE — TELEPHONE ENCOUNTER
From: Nabor Alegria  To:  Felipe Soares MD  Sent: 10/14/2019 6:05 PM EDT  Subject: Prescription Question    please refill fluticasone propionate nasal spray - did not see on my prescription list - thank you

## 2019-10-16 DIAGNOSIS — I10 ESSENTIAL HYPERTENSION: Primary | ICD-10-CM

## 2019-10-16 RX ORDER — IRBESARTAN 300 MG/1
300 TABLET ORAL
Qty: 90 TAB | Refills: 0 | Status: SHIPPED | OUTPATIENT
Start: 2019-10-16 | End: 2020-01-13 | Stop reason: SDUPTHER

## 2019-11-03 DIAGNOSIS — J98.01 BRONCHOSPASM: ICD-10-CM

## 2019-11-04 RX ORDER — MONTELUKAST SODIUM 10 MG/1
TABLET ORAL
Qty: 90 TAB | Refills: 1 | Status: SHIPPED | OUTPATIENT
Start: 2019-11-04 | End: 2020-05-01

## 2019-11-19 ENCOUNTER — TELEPHONE (OUTPATIENT)
Dept: INTERNAL MEDICINE CLINIC | Age: 64
End: 2019-11-19

## 2019-11-19 NOTE — TELEPHONE ENCOUNTER
----- Message from Olvin Cruz sent at 11/19/2019  2:39 PM EST -----  Regarding: Dr. Marlen Ureña first and last name:  Shirley Daugherty    With [facility]      Reason for call:Education support program. and wondered if you could mention it to her on her next visit. We were unable to reach her directly.       Callback required yes/no and why:      Best contact number(s):  [phone]      Details to clarify the request:      Olvin Cruz

## 2020-02-21 ENCOUNTER — PATIENT MESSAGE (OUTPATIENT)
Dept: INTERNAL MEDICINE CLINIC | Age: 65
End: 2020-02-21

## 2020-02-21 NOTE — TELEPHONE ENCOUNTER
From: Lexie Rojo LPN  To: Sydney Ortega  Sent: 2/21/2020 11:05 AM EST  Subject: appointment    Hello  I am reaching out to you on behalf of Dr. Silvia Parker. We have received a refill request for one of your medications, which we have refilled, however, you are due for a follow up in the office.  Please contact the office to schedule an appointment with Dr. Silvia Parker at your earliest convenience to avoid any potential future refill delays  Thank you

## 2020-02-28 ENCOUNTER — HOSPITAL ENCOUNTER (OUTPATIENT)
Dept: LAB | Age: 65
Discharge: HOME | End: 2020-02-28

## 2020-02-28 ENCOUNTER — OFFICE VISIT (OUTPATIENT)
Dept: INTERNAL MEDICINE CLINIC | Age: 65
End: 2020-02-28

## 2020-02-28 VITALS
HEIGHT: 63 IN | WEIGHT: 212.6 LBS | TEMPERATURE: 97.7 F | DIASTOLIC BLOOD PRESSURE: 70 MMHG | BODY MASS INDEX: 37.67 KG/M2 | SYSTOLIC BLOOD PRESSURE: 113 MMHG | HEART RATE: 63 BPM | RESPIRATION RATE: 16 BRPM | OXYGEN SATURATION: 97 %

## 2020-02-28 DIAGNOSIS — I10 ESSENTIAL HYPERTENSION: ICD-10-CM

## 2020-02-28 DIAGNOSIS — E66.9 OBESITY, CLASS II, BMI 35-39.9, ISOLATED (SEE ACTUAL BMI): Primary | ICD-10-CM

## 2020-02-28 DIAGNOSIS — E53.8 B12 DEFICIENCY DUE TO DIET: ICD-10-CM

## 2020-02-28 DIAGNOSIS — E03.9 ACQUIRED HYPOTHYROIDISM: ICD-10-CM

## 2020-02-28 DIAGNOSIS — E66.9 OBESITY, CLASS II, BMI 35-39.9, ISOLATED (SEE ACTUAL BMI): ICD-10-CM

## 2020-02-28 PROBLEM — N95.2 ATROPHIC VAGINITIS: Status: ACTIVE | Noted: 2018-06-25

## 2020-02-28 PROBLEM — R31.29 OTHER MICROSCOPIC HEMATURIA: Status: ACTIVE | Noted: 2018-05-11

## 2020-02-28 LAB
ALBUMIN SERPL-MCNC: 4 G/DL (ref 3.5–5)
ALBUMIN/GLOB SERPL: 1.4 {RATIO} (ref 1.1–2.2)
ALP SERPL-CCNC: 50 U/L (ref 45–117)
ALT SERPL-CCNC: 40 U/L (ref 12–78)
ANION GAP SERPL CALC-SCNC: 5 MMOL/L (ref 5–15)
AST SERPL-CCNC: 22 U/L (ref 15–37)
BILIRUB SERPL-MCNC: 0.5 MG/DL (ref 0.2–1)
BUN SERPL-MCNC: 29 MG/DL (ref 6–20)
BUN/CREAT SERPL: 29 (ref 12–20)
CALCIUM SERPL-MCNC: 10.3 MG/DL (ref 8.5–10.1)
CHLORIDE SERPL-SCNC: 106 MMOL/L (ref 97–108)
CHOLEST SERPL-MCNC: 237 MG/DL
CO2 SERPL-SCNC: 28 MMOL/L (ref 21–32)
CREAT SERPL-MCNC: 0.99 MG/DL (ref 0.55–1.02)
EST. AVERAGE GLUCOSE BLD GHB EST-MCNC: 105 MG/DL
FOLATE SERPL-MCNC: 13.7 NG/ML (ref 5–21)
GLOBULIN SER CALC-MCNC: 2.9 G/DL (ref 2–4)
GLUCOSE SERPL-MCNC: 84 MG/DL (ref 65–100)
HBA1C MFR BLD: 5.3 % (ref 4–5.6)
HDLC SERPL-MCNC: 52 MG/DL
HDLC SERPL: 4.6 {RATIO} (ref 0–5)
LDLC SERPL CALC-MCNC: 163.4 MG/DL (ref 0–100)
LIPID PROFILE,FLP: ABNORMAL
POTASSIUM SERPL-SCNC: 4.2 MMOL/L (ref 3.5–5.1)
PROT SERPL-MCNC: 6.9 G/DL (ref 6.4–8.2)
SODIUM SERPL-SCNC: 139 MMOL/L (ref 136–145)
T4 FREE SERPL-MCNC: 0.8 NG/DL (ref 0.8–1.5)
TRIGL SERPL-MCNC: 108 MG/DL (ref ?–150)
TSH SERPL DL<=0.05 MIU/L-ACNC: 1.58 UIU/ML (ref 0.36–3.74)
VIT B12 SERPL-MCNC: 1211 PG/ML (ref 193–986)
VLDLC SERPL CALC-MCNC: 21.6 MG/DL

## 2020-02-28 RX ORDER — HYDROCHLOROTHIAZIDE 25 MG/1
25 TABLET ORAL DAILY
Qty: 90 TAB | Refills: 3 | Status: SHIPPED | OUTPATIENT
Start: 2020-02-28 | End: 2020-03-17

## 2020-02-28 RX ORDER — BUSPIRONE HYDROCHLORIDE 5 MG/1
TABLET ORAL
Qty: 90 TAB | Refills: 0 | Status: SHIPPED | OUTPATIENT
Start: 2020-02-28 | End: 2020-09-10

## 2020-02-28 RX ORDER — OLMESARTAN MEDOXOMIL 40 MG/1
TABLET ORAL
Qty: 90 TAB | Refills: 3 | Status: SHIPPED | OUTPATIENT
Start: 2020-02-28 | End: 2020-04-14

## 2020-02-28 RX ORDER — BUSPIRONE HYDROCHLORIDE 5 MG/1
TABLET ORAL
Qty: 30 TAB | Refills: 0 | Status: SHIPPED | OUTPATIENT
Start: 2020-02-28 | End: 2020-02-28 | Stop reason: SDUPTHER

## 2020-02-28 RX ORDER — BUSPIRONE HYDROCHLORIDE 5 MG/1
TABLET ORAL
Qty: 30 TAB | Refills: 0 | Status: SHIPPED | OUTPATIENT
Start: 2020-02-28 | End: 2020-09-10

## 2020-02-28 NOTE — PROGRESS NOTES
Stan Wong is a 71 y.o. female and presents with Follow-up  . Subjective:    Since last visit InterStim placed for bladder issues  She had difficulty with anesthesia. Situational stress  Patient reports she is currently on Wellbutrin. She was placed on this by Nita Palacios. Patient reports she is still having some moodiness and so little bit worse. She notes that sometimes she cannot sleep. She has occasional palpitations but this is not new for her. She is having some diarrhea but not extreme bouts. B12 deficiency  Patient is not taking B12 regularly. bmi 36.9  She has lost close to 50 pounds with Nita Palacios /Dr. Gila Pimentel with [place] [facility]  Energy level good   She is back on her Saxenda. SUBJECTIVE: Stan Wong is a 71 y.o. female here for follow up of hypothyroidism. Lab Results   Component Value Date/Time    TSH 1.090 08/07/2019 09:33 AM     Thyroid ROS: denies fatigue, weight changes, heat/cold intolerance, bowel/skin changes or CVS symptoms. Patient notes that she thinks something may be off it may be her thyroid. She is a little bit off from her baseline      Memory issues  She notes for at least 6 months  Stable not progressing  She does not take B12 or multivitamins. Subjective:   Stan Wong is a 71 y.o. female with hypertension. Hypertension ROS: taking medications as instructed, no medication side effects noted, no TIA's, no chest pain on exertion, no dyspnea on exertion, no swelling of ankles. New concerns patient would like 90-day supply. She is not having any side effects on her Benicar. She is taking 25 mg of hydrochlorothiazide    Incontinence  Patient is working with Dr. Clarissa Conteh and Dr. Tim Cowan. Her interstim is working well.       Review of Systems  Constitutional: negative for fevers, chills, anorexia and weight loss  Eyes:   negative for visual disturbance and irritation  ENT:   negative for tinnitus,sore throat,nasal congestion,ear pains.hoarseness  Respiratory:  negative for cough, hemoptysis, dyspnea,wheezing  CV:   negative for chest pain, palpitations, lower extremity edema  GI:   negative for nausea, vomiting, diarrhea, abdominal pain,melena  Endo:               negative for polyuria,polydipsia,polyphagia,heat intolerance  Genitourinary: negative for frequency, dysuria and hematuria  Integument:  negative for rash and pruritus  Hematologic:  negative for easy bruising and gum/nose bleeding  Musculoskel: negative for myalgias, arthralgias, back pain, muscle weakness, joint pain  Neurological:  negative for headaches, dizziness, vertigo, memory problems and gait   Behavl/Psych: negative for feelings of anxiety, depression, mood changes    Past Medical History:   Diagnosis Date    Arthritis     total knee    Endometriosis 1/30/2009    resolved with hysterectomy    Hypertension     Ill-defined condition     IBS    Morbid obesity ([facility] [address]     Precancerous skin lesion     wang    Urge incontinence 08/25/2010    abdullahi michell; botox     Past Surgical History:   Procedure Laterality Date    HX APPENDECTOMY  2010    HX COLONOSCOPY  11/08/2015       10 years again, dr. arreola    HX GYN      c section times 3    HX GYN      ectopic r fallopian tube resected    HX HYSTERECTOMY  1998    HX ORTHOPAEDIC  2006    right wrist fx    HX ORTHOPAEDIC  2010    left knee replacement     HX ORTHOPAEDIC Right 10/19/2017    Elbow     HX UROLOGICAL  2014,   12/3/15    Urethral sling      Social History     Socioeconomic History    Marital status:      Spouse name: Not on file    Number of children: Not on file    Years of education: Not on file    Highest education level: Not on file   Tobacco Use    Smoking status: Never Smoker    Smokeless tobacco: Never Used   Substance and Sexual Activity    Alcohol use:  Yes     Alcohol/week: 1.7 standard drinks     Types: 2 Glasses of wine per week     Comment: on saturedays    Drug use: No    Sexual activity: Yes     Partners: Male     Comment: 39 years    Social History Narrative    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  hydroCHLOROthiazide (HYDRODIURIL) 25 mg tablet Take 1 Tab by mouth daily. 30 Tab 0    olmesartan (BENICAR) 40 mg tablet Take 1 tablet po daily. This is instead of irbesartan. 90 Tab 0    montelukast (SINGULAIR) 10 mg tablet TAKE 1 TABLET DAILY 90 Tab 1    fluticasone propionate (FLONASE) 50 mcg/actuation nasal spray 2 Sprays by Both Nostrils route daily. 1 Bottle 3    cyanocobalamin (VITAMIN B12) 500 mcg tablet TAKE 2 TABLETS BY MOUTH EVERY DAY 60 Tab 0    fluticasone propion-salmeterol (ADVAIR/WIXELA) 250-50 mcg/dose diskus inhaler Take 1 Puff by inhalation two (2) times a day. MUST rinse mouth after use 2 Inhaler 3    Biotin 2,500 mcg cap Take  by mouth.  buPROPion XL (WELLBUTRIN XL) 300 mg XL tablet Take 300 mg by mouth every morning.  thyroid, Pork, (ARMOUR THYROID) 90 mg tablet Take 1 Tab by mouth daily. Indications: hypothyroidism 90 Tab 1    meloxicam (MOBIC) 15 mg tablet Take 1 Tab by mouth daily. DR. Cardoza  30 Tab 3    diphenoxylate-atropine (LOMOTIL) 2.5-0.025 mg per tablet Take 1 Tab by mouth three (3) times daily as needed for Diarrhea. Max Daily Amount: 3 Tabs. 45 Tab 0    hydrocortisone (ANUSOL-HC) 2.5 % rectal cream Insert  into rectum four (4) times daily.  30 g 2    ESTRACE 0.01 % (0.1 mg/gram) vaginal cream       omega-3 fatty acids-vitamin e (FISH OIL) 1,000 mg Cap Take 3 capsules by mouth daily.  cholecalciferol, vitamin d3, (VITAMIN D) 1,000 unit tablet Take 2,000 Units by mouth daily.  cyanocobalamin (VITAMIN B-12) 1,000 mcg/mL injection 1 mL by IntraMUSCular route once for 1 dose. 1 mL 0    cyanocobalamin (VITAMIN B-12) 1,000 mcg/mL injection 1 mL by IntraMUSCular route once for 1 dose. 1 mL 0    diazePAM (VALIUM) 2 mg tablet Only for procedures       Allergies   Allergen Reactions    Codeine Nausea and Vomiting    Erythromycin Rash    Hydrocodone Rash       Objective:  Visit Vitals  /70 (BP 1 Location: Left arm, BP Patient Position: Sitting)   Pulse 63   Temp 97.7 °F (36.5 °C) (Oral)   Resp 16   Ht 5' 3\" (1.6 m)   Wt 212 lb 9.6 oz (96.4 kg)   SpO2 97%   BMI 37.66 kg/m²     Physical Exam:   General appearance - alert, well appearing, and in no distress and overweight  Mental status - alert, oriented to person, place, and time  EYE-PATRICK, EOMI, no foreign bodies, visual acuity normal both eyes, cerumen left ear  ENT-ENT exam normal, no neck nodes or sinus tenderness  Nose - normal and patent, no erythema, discharge or polyps, enlarged nasal turbinates  Mouth - mucous membranes moist, pharynx normal without lesions  Neck - supple, no significant adenopathy   Chest - clear to auscultation, no wheezes, rales or rhonchi, symmetric air entry   Heart - normal rate, regular rhythm, normal S1, S2, no murmurs, rubs, clicks or gallops   Abdomen - soft, nontender, nondistended, no masses or organomegaly  Lymph- no adenopathy palpable  Ext-peripheral pulses normal, no pedal edema, no clubbing or cyanosis  Skin-Warm and dry.  no hyperpigmentation, vitiligo, or suspicious lesions  Neuro -alert, oriented, normal speech, no focal findings or movement disorder noted  Neck-normal C-spine, no tenderness, full ROM without pain          Labs: cmp reviewed with pt from January      Prevention    Cardiovascular profile  Family hx  Exercising:  Walking x 10K, will consider weights  Blood pressure:  Health healthy diet:  Diabetes:  Cholesterol:  Renal function:      Cancer risk profile  Mammogram 10/15, 11/2018  Lung  Colonoscopy 11/15  Skin nonhealing in 2 weeks none, Dr. Pearl Medina abnormal bleeding/discharge/abd pain/pressure hysterectomy      Thyroid sx  constipation    Osteopenia prevention stopped due to kidney stone  BMD  Family hx of kidney stones  Calcium 1000mg/day yes  Vitamin D 800iu/day yes    Mental health scale: 8-9/10, 7-8/10 , 6-7/10 sad since group home  Depression  Anxiety  Sleep # of hours:  Energy Level:        Immunizations  TDAP  Pneumonia vaccine pneumovax  Flu vaccine  Shingles vaccine  HPV  Shingles vaccine not a bad outbreak        Diagnoses and all orders for this visit:    1. Obesity, Class II, BMI 35-39.9, isolated (see actual BMI)  Discussed with patient  Will do baseline labs patient will follow-up with cathie  -     METABOLIC PANEL, COMPREHENSIVE; Future  -     LIPID PANEL; Future  -     HEMOGLOBIN A1C WITH EAG; Future    2. Essential hypertension  Continue blood pressure medication  I will not make any changes at this visit  -     hydroCHLOROthiazide (HYDRODIURIL) 25 mg tablet; Take 1 Tab by mouth daily. -     olmesartan (BENICAR) 40 mg tablet; Take 1 tablet po daily. This is instead of irbesartan. 3. Acquired hypothyroidism  Patient reports symptoms of possible hyperthyroidism  We will recheck labs  -     TSH 3RD GENERATION; Future  -     T4, FREE; Future    4. B12 deficiency due to diet  Replete as needed  Patient was checked for celiac and she does not have celiac disease  -     VITAMIN B12 & FOLATE; Future    Situational stress  We will augment her Wellbutrin with BuSpar  First stress levels are better controlled she may lose more weight. -     busPIRone (BUSPAR) 5 mg tablet;  Take 1/2 to 1 tab po qhs prn insomnia or situational stress      Follow-up in 6 months or earlier if needed

## 2020-03-16 RX ORDER — PREDNISONE 20 MG/1
20 TABLET ORAL
Qty: 7 TAB | Refills: 0 | Status: SHIPPED | OUTPATIENT
Start: 2020-03-16 | End: 2020-04-08 | Stop reason: ALTCHOICE

## 2020-03-17 DIAGNOSIS — I10 ESSENTIAL HYPERTENSION: ICD-10-CM

## 2020-03-17 RX ORDER — HYDROCHLOROTHIAZIDE 25 MG/1
TABLET ORAL
Qty: 30 TAB | Refills: 0 | Status: SHIPPED | OUTPATIENT
Start: 2020-03-17 | End: 2020-04-04

## 2020-04-08 ENCOUNTER — VIRTUAL VISIT (OUTPATIENT)
Dept: INTERNAL MEDICINE CLINIC | Age: 65
End: 2020-04-08

## 2020-04-08 VITALS
DIASTOLIC BLOOD PRESSURE: 66 MMHG | WEIGHT: 210 LBS | BODY MASS INDEX: 37.21 KG/M2 | HEIGHT: 63 IN | SYSTOLIC BLOOD PRESSURE: 176 MMHG

## 2020-04-08 DIAGNOSIS — I10 ESSENTIAL HYPERTENSION: Primary | ICD-10-CM

## 2020-04-08 DIAGNOSIS — F43.9 SITUATIONAL STRESS: ICD-10-CM

## 2020-04-08 DIAGNOSIS — N28.9 RENAL INSUFFICIENCY: ICD-10-CM

## 2020-04-08 RX ORDER — FUROSEMIDE 40 MG/1
40 TABLET ORAL DAILY
Qty: 90 TAB | Refills: 0 | Status: SHIPPED | OUTPATIENT
Start: 2020-04-08 | End: 2020-09-10

## 2020-04-08 RX ORDER — CLONIDINE HYDROCHLORIDE 0.1 MG/1
0.1 TABLET ORAL 2 TIMES DAILY
Qty: 180 TAB | Refills: 1 | Status: SHIPPED | OUTPATIENT
Start: 2020-04-08 | End: 2020-04-15

## 2020-04-08 NOTE — PROGRESS NOTES
Consent: Pavithra Roque, who was seen by synchronous (real-time) audio-video technology, and/or her healthcare decision maker, is aware that this patient-initiated, Telehealth encounter on 2020 is a billable service, with coverage as determined by her insurance carrier. She is aware that she may receive a bill and has provided verbal consent to proceed: YES      712  Assessment & Plan:   Diagnoses and all orders for this visit:    1. Essential hypertension  Not controlled  Pitting edema on video of left foot and historically ankle by patient  HCTZ is not effective  We will transition her to Lasix  If blood pressure still is not improving then add on clonidine  She will need her labs done for her electrolytes  -     furosemide (LASIX) 40 mg tablet; Take 1 Tab by mouth daily. -     cloNIDine HCL (CATAPRES) 0.1 mg tablet; Take 1 Tab by mouth two (2) times a day. -     METABOLIC PANEL, BASIC; Future  -     MAGNESIUM; Future  -     URINALYSIS W/ RFLX MICROSCOPIC; Future    2. Renal insufficiency  Noted renal insufficiency  Will need to recheck her kidney function  -     furosemide (LASIX) 40 mg tablet; Take 1 Tab by mouth daily.  -     URINALYSIS W/ RFLX MICROSCOPIC; Future    3. Situational stress  Stable  Patient is at home with her family      Patient needs labs in 3 weeks. Subjective:   Pavithra Roque is a 71 y.o. female who was seen for Hypertension (BP running high)      Since last visit InterStim placed for bladder issues  She had difficulty with anesthesia. 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 bmi 36.9  She has lost close to 50 pounds with Lubna Anthony /Dr. Portia Johnson with [place] [facility]  Energy level good   She is back on her Saxenda. SUBJECTIVE: Pavithra Roque is a 71 y.o. female here for follow up of hypothyroidism.     Lab Results   Component Value Date/Time    TSH 1.090 08/07/2019 09:33 AM     Thyroid ROS: denies fatigue, weight changes, heat/cold intolerance, bowel/skin changes or CVS symptoms. Patient notes that she thinks something may be off it may be her thyroid. She is a little bit off from her baseline       Subjective:   Jorie Saint is a 71 y.o. female with hypertension. Hypertension ROS: taking medications as instructed, no medication side effects noted, no TIA's, no chest pain on exertion, no dyspnea on exertion,. Swelling of ankles. New concerns she reports her blood pressure at home is been running high today 269/65 and systolic 011. She does have fluid in her legs. She notes the hydrochlorothiazide was not working for her and was not causing her to void. Incontinence  Patient is working with Dr. Leslie Wheeler and Dr. Kisha Fuentes. Her interstim is working well. Current Outpatient Medications   Medication Sig    hydroCHLOROthiazide (HYDRODIURIL) 25 mg tablet TAKE 1/2 TABLET DAILY (Patient taking differently: Take 25 mg by mouth daily. 1 tablet daily)    liraglutide, weight loss, (SAXENDA) 3 mg/0.5 mL (18 mg/3 mL) pen 3 mg by SubCUTAneous route daily.  olmesartan (BENICAR) 40 mg tablet Take 1 tablet po daily. This is instead of irbesartan.  montelukast (SINGULAIR) 10 mg tablet TAKE 1 TABLET DAILY    fluticasone propionate (FLONASE) 50 mcg/actuation nasal spray 2 Sprays by Both Nostrils route daily.  cyanocobalamin (VITAMIN B12) 500 mcg tablet TAKE 2 TABLETS BY MOUTH EVERY DAY    fluticasone propion-salmeterol (ADVAIR/WIXELA) 250-50 mcg/dose diskus inhaler Take 1 Puff by inhalation two (2) times a day. MUST rinse mouth after use (Patient taking differently: Take 1 Puff by inhalation as needed. MUST rinse mouth after use)    Biotin 2,500 mcg cap Take  by mouth.  buPROPion XL (WELLBUTRIN XL) 300 mg XL tablet Take 200 mg by mouth two (2) times a day. 200mg BID    thyroid, Pork, (ARMOUR THYROID) 90 mg tablet Take 1 Tab by mouth daily.  Indications: hypothyroidism    meloxicam (MOBIC) 15 mg tablet Take 1 Tab by mouth daily. DR. Jade You    diphenoxylate-atropine (LOMOTIL) 2.5-0.025 mg per tablet Take 1 Tab by mouth three (3) times daily as needed for Diarrhea. Max Daily Amount: 3 Tabs.  omega-3 fatty acids-vitamin e (FISH OIL) 1,000 mg Cap Take 3 capsules by mouth daily.  cholecalciferol, vitamin d3, (VITAMIN D) 1,000 unit tablet Take 2,000 Units by mouth daily.  busPIRone (BUSPAR) 5 mg tablet Take 1/2 to 1 tab po qhs prn insomnia or situational stress    busPIRone (BUSPAR) 5 mg tablet Take 1/2 to 1 tab po qhs prn insomnia or situational stress    hydrocortisone (ANUSOL-HC) 2.5 % rectal cream Insert  into rectum four (4) times daily. (Patient taking differently: Insert  into rectum as needed.)     No current facility-administered medications for this visit.         Allergies   Allergen Reactions    Codeine Nausea and Vomiting    Erythromycin Rash    Hydrocodone Rash     Subjective    Past Medical History:   Diagnosis Date    Arthritis     total knee    Endometriosis 1/30/2009    resolved with hysterectomy    Hypertension     Ill-defined condition     IBS    Morbid obesity ([facility] [address]     Precancerous skin lesion     wang    Urge incontinence 08/25/2010    abdullahi galarza; botox       ROS  All other systems reviewed and negative, unless mentioned in HPI    Objective:   Vital Signs: (As obtained by patient/caregiver at home)  Visit Vitals  /66 (BP 1 Location: Left arm, BP Patient Position: Sitting) Comment: 6:40am per pt   Ht 5' 3\" (1.6 m)   Wt 210 lb (95.3 kg) Comment: per pt   BMI 37.20 kg/m²        [INSTRUCTIONS:  \"[x]\" Indicates a positive item  \"[]\" Indicates a negative item  -- DELETE ALL ITEMS NOT EXAMINED]    Constitutional: [x] Appears well-developed and well-nourished [x] No apparent distress      [] Abnormal -     Mental status: [x] Alert and awake  [x] Oriented to person/place/time [x] Able to follow commands    [] Abnormal - Eyes:   EOM    [x]  Normal    [] Abnormal -   Sclera  [x]  Normal    [] Abnormal -          Discharge [x]  None visible   [] Abnormal -     HENT: [x] Normocephalic, atraumatic  [] Abnormal -   [x] Mouth/Throat: Mucous membranes are moist    External Ears [x] Normal  [] Abnormal -    Neck: [x] No visualized mass [] Abnormal -     Pulmonary/Chest: [x] Respiratory effort normal   [x] No visualized signs of difficulty breathing or respiratory distress        [] Abnormal -      Musculoskeletal:   [x] Normal gait with no signs of ataxia         [x] Normal range of motion of neck        [] Abnormal -     Neurological:        [x] No Facial Asymmetry (Cranial nerve 7 motor function) (limited exam due to video visit)          [x] No gaze palsy        [] Abnormal -          Skin:        [x] No significant exanthematous lesions or discoloration noted on facial skin         [] Abnormal -            Psychiatric:       [x] Normal Affect [] Abnormal -        [x] No Hallucinations    Other pertinent observable physical exam findings:-      We discussed the expected course, resolution and complications of the diagnosis(es) in detail. Medication risks, benefits, costs, interactions, and alternatives were discussed as indicated. I advised her to contact the office if her condition worsens, changes or fails to improve as anticipated. She expressed understanding with the diagnosis(es) and plan. Christie Fernandes is a 71 y.o. female being evaluated by a video visit encounter for concerns as above. A caregiver was present when appropriate. Due to this being a TeleHealth encounter (During VILMW-56 public health emergency), evaluation of the following organ systems was limited: Vitals/Constitutional/EENT/Resp/CV/GI//MS/Neuro/Skin/Heme-Lymph-Imm.   Pursuant to the emergency declaration under the [address] [facility] [address] waiver authority and the [employer] Appropriations Act, this Virtual  Visit was conducted, with patient's (and/or legal guardian's) consent, to reduce the patient's risk of exposure to COVID-19 and provide necessary medical care. Services were provided through a video synchronous discussion virtually to substitute for in-person clinic visit. Patient and provider were located at their individual homes.     David Aldana MD

## 2020-04-14 DIAGNOSIS — I10 ESSENTIAL HYPERTENSION: ICD-10-CM

## 2020-04-14 RX ORDER — OLMESARTAN MEDOXOMIL 40 MG/1
TABLET ORAL
Qty: 90 TAB | Refills: 0 | Status: SHIPPED | OUTPATIENT
Start: 2020-04-14 | End: 2020-04-24 | Stop reason: ALTCHOICE

## 2020-04-15 ENCOUNTER — VIRTUAL VISIT (OUTPATIENT)
Dept: INTERNAL MEDICINE CLINIC | Age: 65
End: 2020-04-15

## 2020-04-15 ENCOUNTER — PATIENT MESSAGE (OUTPATIENT)
Dept: INTERNAL MEDICINE CLINIC | Age: 65
End: 2020-04-15

## 2020-04-15 VITALS
TEMPERATURE: 96.5 F | DIASTOLIC BLOOD PRESSURE: 83 MMHG | BODY MASS INDEX: 37.39 KG/M2 | SYSTOLIC BLOOD PRESSURE: 185 MMHG | HEIGHT: 63 IN | WEIGHT: 211 LBS

## 2020-04-15 DIAGNOSIS — I10 ESSENTIAL HYPERTENSION: ICD-10-CM

## 2020-04-15 DIAGNOSIS — E78.2 MIXED HYPERLIPIDEMIA: ICD-10-CM

## 2020-04-15 DIAGNOSIS — R68.83 CHILLS (WITHOUT FEVER): ICD-10-CM

## 2020-04-15 DIAGNOSIS — N28.9 RENAL INSUFFICIENCY: ICD-10-CM

## 2020-04-15 DIAGNOSIS — I10 ESSENTIAL HYPERTENSION: Primary | ICD-10-CM

## 2020-04-15 RX ORDER — CLONIDINE HYDROCHLORIDE 0.1 MG/1
0.2 TABLET ORAL 2 TIMES DAILY
Qty: 180 TAB | Refills: 1 | Status: SHIPPED | OUTPATIENT
Start: 2020-04-15 | End: 2020-05-01

## 2020-04-15 RX ORDER — ATORVASTATIN CALCIUM 10 MG/1
10 TABLET, FILM COATED ORAL DAILY
Qty: 90 TAB | Refills: 0 | Status: SHIPPED | OUTPATIENT
Start: 2020-04-15 | End: 2020-06-01 | Stop reason: SDUPTHER

## 2020-04-15 NOTE — PROGRESS NOTES
Consent: Lin Arango, who was seen by synchronous (real-time) audio-video technology, and/or her healthcare decision maker, is aware that this patient-initiated, Telehealth encounter on 4/15/2020 is a billable service, with coverage as determined by her insurance carrier. She is aware that she may receive a bill and has provided verbal consent to proceed: YES      712  Assessment & Plan:     Diagnoses and all orders for this visit:    1. Essential hypertension  Not well controlled  Will increase decrease her clonidine 0.2 mg twice a day  Will monitor and if needed we will also increase her Lasix. -     cloNIDine HCL (CATAPRES) 0.1 mg tablet; Take 2 Tabs by mouth two (2) times a day. Indications: high blood pressure    The predictive value of the PRA can be increased by measuring the rise in PRA one hour after the administration of 25 to 50 mg of captopril, a rapidly acting angiotensin-converting enzyme (ACE) inhibitor [26,29]. Patients with renal artery stenosis have an exaggerated increase in the PRA as compared with normal individuals, perhaps due to removal of the normal suppressive effect of high angiotensin II levels on renin secretion in the stenotic kidney        2. Chills (without fever)  Monitor  She does not have any COVID symptoms    3. Renal insufficiency  Patient's renal insufficiency I have requested that she get labs      4. Mixed hyperlipidemia  Given her hypertension and recent lipids she needs to restart her cholesterol medicine  -     atorvastatin (LIPITOR) 10 mg tablet; Take 1 Tab by mouth daily. Please increase to 2 po daily if tolerated              Subjective:   Lin Arango is a 71 y.o. female who was seen for Hypertension      Situational stress  Cousin  of covid   is an essential employee and still working      bmi 36.9  She has lost close to 50 pounds with Yaakov Lantigua /Dr. Quispe Settler with [place] [facility]  Energy level good   She is back on her [address].     SUBJECTIVE: Shreyas Esteves is a 71 y.o. female here for follow up of hypothyroidism. Lab Results   Component Value Date/Time    TSH 1.090 08/07/2019 09:33 AM     Thyroid ROS: denies fatigue, weight changes, heat/cold intolerance, bowel/skin changes or CVS symptoms. Patient notes that she thinks something may be off it may be her thyroid. She is a little bit off from her baseline       Subjective:   136/80  Monday 162/66 in the am, Tuesday 146/ 186, 181 at 4 in the afternoon 171, this am 185/83    Shreyas Esteves is a 71 y.o. female with hypertension. Hypertension ROS: taking medications as instructed, no medication side effects noted, no TIA's, no chest pain on exertion, no dyspnea on exertion,. Swelling of ankles. New concerns her edema has improved however her blood pressure has increased since the past 3 days. She has been on clonidine 0.1 mg twice a day for 1 day      Incontinence  Patient is working with Dr. Shiela Nava and Dr. Priscilla Boswell. Her interstim is working well. Current Outpatient Medications   Medication Sig    olmesartan (BENICAR) 40 mg tablet TAKE 1 TABLET BY MOUTH EVERY DAY *REPLACES IRBESARTAN    furosemide (LASIX) 40 mg tablet Take 1 Tab by mouth daily.  cloNIDine HCL (CATAPRES) 0.1 mg tablet Take 1 Tab by mouth two (2) times a day.  liraglutide, weight loss, (SAXENDA) 3 mg/0.5 mL (18 mg/3 mL) pen 3 mg by SubCUTAneous route daily.  montelukast (SINGULAIR) 10 mg tablet TAKE 1 TABLET DAILY    fluticasone propionate (FLONASE) 50 mcg/actuation nasal spray 2 Sprays by Both Nostrils route daily.  cyanocobalamin (VITAMIN B12) 500 mcg tablet TAKE 2 TABLETS BY MOUTH EVERY DAY    fluticasone propion-salmeterol (ADVAIR/WIXELA) 250-50 mcg/dose diskus inhaler Take 1 Puff by inhalation two (2) times a day. MUST rinse mouth after use (Patient taking differently: Take 1 Puff by inhalation as needed. MUST rinse mouth after use)    Biotin 2,500 mcg cap Take  by mouth.     buPROPion XL (Wellbutrin XL) 300 mg XL tablet Take 200 mg by mouth two (2) times a day. 200mg BID    thyroid, Pork, (ARMOUR THYROID) 90 mg tablet Take 1 Tab by mouth daily. Indications: hypothyroidism    meloxicam (MOBIC) 15 mg tablet Take 1 Tab by mouth daily. DR. Jin Severino    diphenoxylate-atropine (LOMOTIL) 2.5-0.025 mg per tablet Take 1 Tab by mouth three (3) times daily as needed for Diarrhea. Max Daily Amount: 3 Tabs.  hydrocortisone (ANUSOL-HC) 2.5 % rectal cream Insert  into rectum four (4) times daily. (Patient taking differently: Insert  into rectum as needed.)    omega-3 fatty acids-vitamin e (FISH OIL) 1,000 mg Cap Take 3 capsules by mouth daily.  cholecalciferol, vitamin d3, (VITAMIN D) 1,000 unit tablet Take 2,000 Units by mouth daily.  busPIRone (BUSPAR) 5 mg tablet Take 1/2 to 1 tab po qhs prn insomnia or situational stress    busPIRone (BUSPAR) 5 mg tablet Take 1/2 to 1 tab po qhs prn insomnia or situational stress     No current facility-administered medications for this visit.         Allergies   Allergen Reactions    Codeine Nausea and Vomiting    Erythromycin Rash    Hydrocodone Rash     Subjective    Past Medical History:   Diagnosis Date    Arthritis     total knee    Endometriosis 1/30/2009    resolved with hysterectomy    Hypertension     Ill-defined condition     IBS    Morbid obesity ([facility] [address]     Precancerous skin lesion     wang    Urge incontinence 08/25/2010    abdullahi michell; botox       ROS  All other systems reviewed and negative, unless mentioned in HPI    Objective:   Vital Signs: (As obtained by patient/caregiver at home)  Visit Vitals  /83 (BP 1 Location: Left arm, BP Patient Position: Sitting) Comment: per pt   Temp 96.5 °F (35.8 °C) (Oral) Comment: per pt   Ht 5' 3\" (1.6 m)   Wt 211 lb (95.7 kg) Comment: per pt   BMI 37.38 kg/m²        [INSTRUCTIONS:  \"[x]\" Indicates a positive item  \"[]\" Indicates a negative item  -- DELETE ALL ITEMS NOT EXAMINED]    Constitutional: [x] Appears well-developed and well-nourished [x] No apparent distress      [] Abnormal -     Mental status: [x] Alert and awake  [x] Oriented to person/place/time [x] Able to follow commands    [] Abnormal -     Eyes:   EOM    [x]  Normal    [] Abnormal -   Sclera  [x]  Normal    [] Abnormal -          Discharge [x]  None visible   [] Abnormal -     HENT: [x] Normocephalic, atraumatic  [] Abnormal -   [x] Mouth/Throat: Mucous membranes are moist    External Ears [x] Normal  [] Abnormal -    Neck: [x] No visualized mass [] Abnormal -     Pulmonary/Chest: [x] Respiratory effort normal   [x] No visualized signs of difficulty breathing or respiratory distress        [] Abnormal -      Musculoskeletal:   [x] Normal gait with no signs of ataxia         [x] Normal range of motion of neck        [] Abnormal -     Neurological:        [x] No Facial Asymmetry (Cranial nerve 7 motor function) (limited exam due to video visit)          [x] No gaze palsy        [] Abnormal -          Skin:        [x] No significant exanthematous lesions or discoloration noted on facial skin         [] Abnormal -            Psychiatric:       [x] Normal Affect [] Abnormal -        [x] No Hallucinations    Other pertinent observable physical exam findings:-      We discussed the expected course, resolution and complications of the diagnosis(es) in detail. Medication risks, benefits, costs, interactions, and alternatives were discussed as indicated. I advised her to contact the office if her condition worsens, changes or fails to improve as anticipated. She expressed understanding with the diagnosis(es) and plan. Bella Banks is a 71 y.o. female being evaluated by a video visit encounter for concerns as above. A caregiver was present when appropriate.  Due to this being a TeleHealth encounter (During [facility]-35 public health emergency), evaluation of the following organ systems was limited: Vitals/Constitutional/EENT/Resp/CV/GI//MS/Neuro/Skin/Heme-Lymph-Imm. Pursuant to the emergency declaration under the [address] [place] [address], [facility] waiver authority and the [employer] Act, this Virtual  Visit was conducted, with patient's (and/or legal guardian's) consent, to reduce the patient's risk of exposure to COVID-19 and provide necessary medical care. Services were provided through a video synchronous discussion virtually to substitute for in-person clinic visit. Patient and provider were located at their individual homes.     Francisca Olson MD

## 2020-04-15 NOTE — TELEPHONE ENCOUNTER
----- Message from Blake Beth sent at 4/14/2020  4:50 PM EDT -----  Regarding: Non-Urgent Medical Question  Contact: [phone]  Good afternoon - blood pressure is spiking today - gradually increasing   4/12 135/64  4/13 162/66  4/14 146/54 2:32am, 175/76 9:40am, 184/72 4:39pm I took a whole clonidine    thoughts please

## 2020-04-16 ENCOUNTER — PATIENT MESSAGE (OUTPATIENT)
Dept: INTERNAL MEDICINE CLINIC | Age: 65
End: 2020-04-16

## 2020-04-17 ENCOUNTER — OFFICE VISIT (OUTPATIENT)
Dept: INTERNAL MEDICINE CLINIC | Age: 65
End: 2020-04-17

## 2020-04-17 VITALS
WEIGHT: 219.5 LBS | BODY MASS INDEX: 38.89 KG/M2 | HEART RATE: 55 BPM | TEMPERATURE: 97.9 F | DIASTOLIC BLOOD PRESSURE: 66 MMHG | RESPIRATION RATE: 18 BRPM | HEIGHT: 63 IN | SYSTOLIC BLOOD PRESSURE: 103 MMHG | OXYGEN SATURATION: 98 %

## 2020-04-17 DIAGNOSIS — E78.2 MIXED HYPERLIPIDEMIA: ICD-10-CM

## 2020-04-17 DIAGNOSIS — I10 ESSENTIAL HYPERTENSION: Primary | ICD-10-CM

## 2020-04-17 NOTE — PATIENT INSTRUCTIONS
High Blood Pressure: Care Instructions  Overview    It's normal for blood pressure to go up and down throughout the day. But if it stays up, you have high blood pressure. Another name for high blood pressure is hypertension. Despite what a lot of people think, high blood pressure usually doesn't cause headaches or make you feel dizzy or lightheaded. It usually has no symptoms. But it does increase your risk of stroke, heart attack, and other problems. You and your doctor will talk about your risks of these problems based on your blood pressure. Your doctor will give you a goal for your blood pressure. Your goal will be based on your health and your age. Lifestyle changes, such as eating healthy and being active, are always important to help lower blood pressure. You might also take medicine to reach your blood pressure goal.  Follow-up care is a key part of your treatment and safety. Be sure to make and go to all appointments, and call your doctor if you are having problems. It's also a good idea to know your test results and keep a list of the medicines you take. How can you care for yourself at home? Medical treatment  · If you stop taking your medicine, your blood pressure will go back up. You may take one or more types of medicine to lower your blood pressure. Be safe with medicines. Take your medicine exactly as prescribed. Call your doctor if you think you are having a problem with your medicine. · Talk to your doctor before you start taking aspirin every day. Aspirin can help certain people lower their risk of a heart attack or stroke. But taking aspirin isn't right for everyone, because it can cause serious bleeding. · See your doctor regularly. You may need to see the doctor more often at first or until your blood pressure comes down. · If you are taking blood pressure medicine, talk to your doctor before you take decongestants or anti-inflammatory medicine, such as ibuprofen.  Some of these medicines can raise blood pressure. · Learn how to check your blood pressure at home. Lifestyle changes  · Stay at a healthy weight. This is especially important if you put on weight around the waist. Losing even 10 pounds can help you lower your blood pressure. · If your doctor recommends it, get more exercise. Walking is a good choice. Bit by bit, increase the amount you walk every day. Try for at least 30 minutes on most days of the week. You also may want to swim, bike, or do other activities. · Avoid or limit alcohol. Talk to your doctor about whether you can drink any alcohol. · Try to limit how much sodium you eat to less than 2,300 milligrams (mg) a day. Your doctor may ask you to try to eat less than 1,500 mg a day. · Eat plenty of fruits (such as bananas and oranges), vegetables, legumes, whole grains, and low-fat dairy products. · Lower the amount of saturated fat in your diet. Saturated fat is found in animal products such as milk, cheese, and meat. Limiting these foods may help you lose weight and also lower your risk for heart disease. · Do not smoke. Smoking increases your risk for heart attack and stroke. If you need help quitting, talk to your doctor about stop-smoking programs and medicines. These can increase your chances of quitting for good. When should you call for help? Call  911 anytime you think you may need emergency care. This may mean having symptoms that suggest that your blood pressure is causing a serious heart or blood vessel problem. Your blood pressure may be over 180/120.   For example, call  911 if:    · You have symptoms of a heart attack. These may include:  ? Chest pain or pressure, or a strange feeling in the chest.  ? Sweating. ? Shortness of breath. ? Nausea or vomiting. ? Pain, pressure, or a strange feeling in the back, neck, jaw, or upper belly or in one or both shoulders or arms. ? Lightheadedness or sudden weakness.   ? A fast or irregular heartbeat.     · You have symptoms of a stroke. These may include:  ? Sudden numbness, tingling, weakness, or loss of movement in your face, arm, or leg, especially on only one side of your body. ? Sudden vision changes. ? Sudden trouble speaking. ? Sudden confusion or trouble understanding simple statements. ? Sudden problems with walking or balance. ? A sudden, severe headache that is different from past headaches.     · You have severe back or belly pain.    Do not wait until your blood pressure comes down on its own. Get help right away.   Call your doctor now or seek immediate care if:    · Your blood pressure is much higher than normal (such as 180/120 or higher), but you don't have symptoms.     · You think high blood pressure is causing symptoms, such as:  ? Severe headache.  ? Blurry vision.    Watch closely for changes in your health, and be sure to contact your doctor if:    · Your blood pressure measures higher than your doctor recommends at least 2 times. That means the top number is higher or the bottom number is higher, or both.     · You think you may be having side effects from your blood pressure medicine. Where can you learn more? Go to http://jose-darvin.info/  Enter [id number] in the search box to learn more about \"High Blood Pressure: Care Instructions. \"  Current as of: December 15, 2019Content Version: 12.4  © 2968-0799 [employer]. Care instructions adapted under license by [employer] (which disclaims liability or warranty for this information). If you have questions about a medical condition or this instruction, always ask your healthcare professional. [place] [address] any warranty or liability for your use of this information.

## 2020-04-17 NOTE — PROGRESS NOTES
Chief Complaint   Patient presents with    Blood Pressure Check       Patient was called on April 24, 2020 at 2:00 due to history of blood pressure not optimally controlled and potential side effects of Benicar. She has also an elevated renin activity level and also a decrease in her kidney function. Her renal insufficiency may have been aggravated by the Lasix. The Lasix actually decreased the fluid/edema and decreased her blood pressure but blood pressure then eventually came up again. Due to side effects of Benicar we will transition her back to lisinopril. She was taken off of lisinopril due to 5% chance could have been causing her lichen planus. She notes she was able to tolerate the lisinopril. We will put her on lisinopril and spironolactone. If her blood pressure still not controlled we will add amlodipine. Discussed with patient and she will need to have her labs rechecked in 3 weeks. Will need to check her potassium and kidney function. She is aware. She is also trying to get an appointment with nephrology. Subjective:   Sander Adam is a 71 y.o. female with hypertension. Hypertension ROS: taking medications as instructed, no medication side effects noted, no TIA's, no chest pain on exertion, no dyspnea on exertion, no swelling of ankles. New concerns: Patient reports she took her blood pressure last night and it was 191/86. She took 2 clonidine last night and this morning she took 2 clonidine and increase the Lasix to 80 mg to try to get her blood pressure down. She reports this morning her blood pressure was in the 130/80 range. She has some mild lightheadedness but not severe. She does not have chest pain or shortness of breath. She has been taking her blood pressure medication in the evening and not in the morning. She read that it was more advantageous to take blood pressure medicine in the evening.   She has contacted sleep study and she will be evaluated in a week. She reports that she does have some constipation      Cholesterol  Patient has started atorvastatin. She does not have any muscle aches or muscle pains. .           Past Medical History:   Diagnosis Date    Arthritis     total knee    Endometriosis 1/30/2009    resolved with hysterectomy    Hypertension     Ill-defined condition     IBS    Morbid obesity ([address]     Precancerous skin lesion     wang    Urge incontinence 08/25/2010    abdullahi michell; botox     Past Surgical History:   Procedure Laterality Date    HX APPENDECTOMY  2010    HX COLONOSCOPY  11/08/2015       10 years again, dr. arreola    HX GYN      c section times 3    HX GYN      ectopic r fallopian tube resected    HX HYSTERECTOMY  1998    HX ORTHOPAEDIC  2006    right wrist fx    HX ORTHOPAEDIC  2010    left knee replacement     HX ORTHOPAEDIC Right 10/19/2017    Elbow     HX UROLOGICAL  2014,   12/3/15    Urethral sling      Social History     Socioeconomic History    Marital status:      Spouse name: Not on file    Number of children: Not on file    Years of education: Not on file    Highest education level: Not on file   Tobacco Use    Smoking status: Never Smoker    Smokeless tobacco: Never Used   Substance and Sexual Activity    Alcohol use: Yes     Alcohol/week: 1.7 standard drinks     Types: 2 Glasses of wine per week     Comment: on saturedays    Drug use: No    Sexual activity: Yes     Partners: Male     Comment: 39 years    Social History Narrative    Retired [employer].   She does the Financial, Administative very busy in the nursing    Enjoys it, not stressfull, flexibility            3 sons healthy:  Inflammatory Bowel 1 son, Cdiff         Family History   Problem Relation Age of Onset    Cancer Father         prostate ca    Cancer Sister         lung ca 55    Heart Disease Mother 68        a fib    Prostate Cancer Brother      Current Outpatient Medications   Medication Sig Dispense Refill    cloNIDine HCL (CATAPRES) 0.1 mg tablet Take 2 Tabs by mouth two (2) times a day. Indications: high blood pressure 180 Tab 1    atorvastatin (LIPITOR) 10 mg tablet Take 1 Tab by mouth daily. Please increase to 2 po daily if tolerated 90 Tab 0    olmesartan (BENICAR) 40 mg tablet TAKE 1 TABLET BY MOUTH EVERY DAY *REPLACES IRBESARTAN 90 Tab 0    furosemide (LASIX) 40 mg tablet Take 1 Tab by mouth daily. 90 Tab 0    liraglutide, weight loss, (SAXENDA) 3 mg/0.5 mL (18 mg/3 mL) pen 3 mg by SubCUTAneous route daily.  montelukast (SINGULAIR) 10 mg tablet TAKE 1 TABLET DAILY 90 Tab 1    fluticasone propionate (FLONASE) 50 mcg/actuation nasal spray 2 Sprays by Both Nostrils route daily. 1 Bottle 3    cyanocobalamin (VITAMIN B12) 500 mcg tablet TAKE 2 TABLETS BY MOUTH EVERY DAY 60 Tab 0    fluticasone propion-salmeterol (ADVAIR/WIXELA) 250-50 mcg/dose diskus inhaler Take 1 Puff by inhalation two (2) times a day. MUST rinse mouth after use (Patient taking differently: Take 1 Puff by inhalation as needed. MUST rinse mouth after use) 2 Inhaler 3    Biotin 2,500 mcg cap Take  by mouth.  buPROPion XL (Wellbutrin XL) 300 mg XL tablet Take 200 mg by mouth two (2) times a day. 200mg BID      thyroid, Pork, (ARMOUR THYROID) 90 mg tablet Take 1 Tab by mouth daily. Indications: hypothyroidism 90 Tab 1    meloxicam (MOBIC) 15 mg tablet Take 1 Tab by mouth daily. DR. Giles Ford 30 Tab 3    diphenoxylate-atropine (LOMOTIL) 2.5-0.025 mg per tablet Take 1 Tab by mouth three (3) times daily as needed for Diarrhea. Max Daily Amount: 3 Tabs. 45 Tab 0    hydrocortisone (ANUSOL-HC) 2.5 % rectal cream Insert  into rectum four (4) times daily. (Patient taking differently: Insert  into rectum as needed.) 30 g 2    omega-3 fatty acids-vitamin e (FISH OIL) 1,000 mg Cap Take 3 capsules by mouth daily.  cholecalciferol, vitamin d3, (VITAMIN D) 1,000 unit tablet Take 2,000 Units by mouth daily.       busPIRone (BUSPAR) 5 mg tablet Take 1/2 to 1 tab po qhs prn insomnia or situational stress 90 Tab 0    busPIRone (BUSPAR) 5 mg tablet Take 1/2 to 1 tab po qhs prn insomnia or situational stress 30 Tab 0     Allergies   Allergen Reactions    Codeine Nausea and Vomiting    Erythromycin Rash    Hydrocodone Rash       Review of Systems - General ROS: negative for - chills, fatigue, fever, hot flashes, malaise, night sweats or sleep disturbance  Cardiovascular ROS: no chest pain or dyspnea on exertion  Respiratory ROS: no cough, shortness of breath, or wheezing    Visit Vitals  /66 (BP 1 Location: Left arm, BP Patient Position: Sitting)   Pulse (!) 55   Temp 97.9 °F (36.6 °C) (Oral)   Resp 18   Ht 5' 3\" (1.6 m)   Wt 219 lb 8 oz (99.6 kg)   SpO2 98%   BMI 38.88 kg/m²     General Appearance:  Well developed, well nourished,alert and oriented x 3, and individual in no acute distress. Ears/Nose/Mouth/Throat:   Hearing grossly normal.         Neck: Supple, no lad, no bruits   Chest:   Lungs clear to auscultation bilaterally. Cardiovascular:  Regular rate and rhythm, S1, S2 normal, no murmur. Abdomen:   Soft, non-tender, bowel sounds are active. Extremities: No edema bilaterally. Skin: Warm and dry, no suspicious lesions                 Diagnoses and all orders for this visit:    1. Essential hypertension  Much better controlled  On recheck I got 10 8/64    There are multiple factors that may be affecting her labile blood pressure. I do think she needs to be evaluated for sleep study still as her blood pressure could be caused from this. She may have poor gut absorption of her blood pressure medicines which may be causing some of her symptoms  She will move her blood pressure medicines to the morning    If her blood pressure is better we may be able to pull off some of her clonidine however for now we will continue on clonidine 2 tablets twice a day, Benicar 40 mg daily and Lasix 40 mg.   She is aware that if her blood pressures continues to creep up she will add on an increase her Lasix to 80 mg follow-up in      2. Mixed hyperlipidemia  I encouraged patient to continue her atorvastatin. I encouraged her to do a CT calcification scan of her heart to look at the vessels.   Even if we can get her LDL to a good level if she has any calcification in her left anterior descending or left main would advocate potentially increasing her dose of atorvastatin    Follow-up in 6 weeks or earlier if needed

## 2020-04-20 ENCOUNTER — HOSPITAL ENCOUNTER (OUTPATIENT)
Dept: LAB | Age: 65
Discharge: HOME | End: 2020-04-20

## 2020-04-20 ENCOUNTER — VIRTUAL VISIT (OUTPATIENT)
Dept: SLEEP MEDICINE | Age: 65
End: 2020-04-20

## 2020-04-20 VITALS — HEIGHT: 63 IN | WEIGHT: 211 LBS | BODY MASS INDEX: 37.39 KG/M2

## 2020-04-20 DIAGNOSIS — I10 ESSENTIAL HYPERTENSION: ICD-10-CM

## 2020-04-20 DIAGNOSIS — N28.9 RENAL INSUFFICIENCY: ICD-10-CM

## 2020-04-20 DIAGNOSIS — G47.33 OSA (OBSTRUCTIVE SLEEP APNEA): Primary | ICD-10-CM

## 2020-04-20 LAB
ANION GAP SERPL CALC-SCNC: 6 MMOL/L (ref 5–15)
APPEARANCE UR: CLEAR
BACTERIA URNS QL MICRO: NEGATIVE /HPF
BILIRUB UR QL: NEGATIVE
BUN SERPL-MCNC: 36 MG/DL (ref 6–20)
BUN/CREAT SERPL: 28 (ref 12–20)
CALCIUM SERPL-MCNC: 9.7 MG/DL (ref 8.5–10.1)
CHLORIDE SERPL-SCNC: 108 MMOL/L (ref 97–108)
CO2 SERPL-SCNC: 25 MMOL/L (ref 21–32)
COLOR UR: ABNORMAL
CREAT SERPL-MCNC: 1.28 MG/DL (ref 0.55–1.02)
EPITH CASTS URNS QL MICRO: ABNORMAL /LPF
GLUCOSE SERPL-MCNC: 93 MG/DL (ref 65–100)
GLUCOSE UR STRIP.AUTO-MCNC: NEGATIVE MG/DL
HGB UR QL STRIP: NEGATIVE
HYALINE CASTS URNS QL MICRO: ABNORMAL /LPF (ref 0–5)
KETONES UR QL STRIP.AUTO: NEGATIVE MG/DL
LEUKOCYTE ESTERASE UR QL STRIP.AUTO: ABNORMAL
MAGNESIUM SERPL-MCNC: 2 MG/DL (ref 1.6–2.4)
NITRITE UR QL STRIP.AUTO: NEGATIVE
PH UR STRIP: 5 [PH] (ref 5–8)
POTASSIUM SERPL-SCNC: 4.2 MMOL/L (ref 3.5–5.1)
PROT UR STRIP-MCNC: NEGATIVE MG/DL
RBC #/AREA URNS HPF: ABNORMAL /HPF (ref 0–5)
SODIUM SERPL-SCNC: 139 MMOL/L (ref 136–145)
SP GR UR REFRACTOMETRY: 1.01 (ref 1–1.03)
UROBILINOGEN UR QL STRIP.AUTO: 0.2 EU/DL (ref 0.2–1)
WBC URNS QL MICRO: ABNORMAL /HPF (ref 0–4)

## 2020-04-20 NOTE — PROGRESS NOTES
[address] [facility]. [place], [address]  Tel.  [phone]  Fax. 100 [facility] 60  [place], [address] [facility]  Tel.  [phone]  Fax. [phone] [address]  Tel.  [phone]  Fax. [phone]         Subjective:    Rima Harper is a 71 y.o. female who was seen by synchronous (real-time) audio-video technology on 4/20/2020. Consent:  She is aware that this patient-initiated Telehealth encounter is a billable service, with coverage as determined by her insurance carrier. She is aware that she may receive a bill and has provided verbal consent to proceed: Yes    I was at home while conducting this encounter. Patient verified with 's License. She complains of snoring associated with awakening in the middle of the night because of no specific reason. Symptoms began several years ago, unchanged since that time. She usually can fall asleep in 5 minutes. Family or house members note snoring. She denies completely or partially paralyzed while falling asleep or waking up. Rima Harper does wake up frequently at night. She is bothered by waking up too early and left unable to get back to sleep. She actually sleeps about 6 hours at night and wakes up about 1 times during the night. She does not work shifts:  .   angy Ronny indicates she   get too little sleep at night. Her bedtime is 2030. She awakens at 0100. She does take naps. She takes 5 naps a week lasting 1 hours. She has the following observed behaviors: Loud snoring, Grinding teeth - uses a mouth guard. Other remarks:   She is referred for DRE evaluation due to difficult to control hypertension. She has a history of recurrent pneumonia - right lung noted to have low oxygen follow knee replacement during the hospitalization.     [place] Sleepiness Score: 14   and Modified F.O.S.Q. Score Total / 2: 19.5      Allergies   Allergen Reactions    Codeine Nausea and Vomiting    Erythromycin Rash    Hydrocodone Rash         Current Outpatient Medications:     cloNIDine HCL (CATAPRES) 0.1 mg tablet, Take 2 Tabs by mouth two (2) times a day. Indications: high blood pressure, Disp: 180 Tab, Rfl: 1    atorvastatin (LIPITOR) 10 mg tablet, Take 1 Tab by mouth daily. Please increase to 2 po daily if tolerated, Disp: 90 Tab, Rfl: 0    olmesartan (BENICAR) 40 mg tablet, TAKE 1 TABLET BY MOUTH EVERY DAY *REPLACES IRBESARTAN, Disp: 90 Tab, Rfl: 0    furosemide (LASIX) 40 mg tablet, Take 1 Tab by mouth daily. , Disp: 90 Tab, Rfl: 0    liraglutide, weight loss, (SAXENDA) 3 mg/0.5 mL (18 mg/3 mL) pen, 3 mg by SubCUTAneous route daily. , Disp: , Rfl:     montelukast (SINGULAIR) 10 mg tablet, TAKE 1 TABLET DAILY, Disp: 90 Tab, Rfl: 1    fluticasone propionate (FLONASE) 50 mcg/actuation nasal spray, 2 Sprays by Both Nostrils route daily. , Disp: 1 Bottle, Rfl: 3    cyanocobalamin (VITAMIN B12) 500 mcg tablet, TAKE 2 TABLETS BY MOUTH EVERY DAY, Disp: 60 Tab, Rfl: 0    fluticasone propion-salmeterol (ADVAIR/WIXELA) 250-50 mcg/dose diskus inhaler, Take 1 Puff by inhalation two (2) times a day. MUST rinse mouth after use (Patient taking differently: Take 1 Puff by inhalation as needed. MUST rinse mouth after use), Disp: 2 Inhaler, Rfl: 3    Biotin 2,500 mcg cap, Take  by mouth., Disp: , Rfl:     buPROPion XL (Wellbutrin XL) 300 mg XL tablet, Take 200 mg by mouth two (2) times a day. 200mg BID, Disp: , Rfl:     thyroid, Pork, (ARMOUR THYROID) 90 mg tablet, Take 1 Tab by mouth daily. Indications: hypothyroidism, Disp: 90 Tab, Rfl: 1    meloxicam (MOBIC) 15 mg tablet, Take 1 Tab by mouth daily. DR. Niyah James, Disp: 30 Tab, Rfl: 3    diphenoxylate-atropine (LOMOTIL) 2.5-0.025 mg per tablet, Take 1 Tab by mouth three (3) times daily as needed for Diarrhea.  Max Daily Amount: 3 Tabs., Disp: 45 Tab, Rfl: 0    hydrocortisone (ANUSOL-HC) 2.5 % rectal cream, Insert  into rectum four (4) times daily. (Patient taking differently: Insert  into rectum as needed.), Disp: 30 g, Rfl: 2    omega-3 fatty acids-vitamin e (FISH OIL) 1,000 mg Cap, Take 3 capsules by mouth daily. , Disp: , Rfl:     cholecalciferol, vitamin d3, (VITAMIN D) 1,000 unit tablet, Take 2,000 Units by mouth daily. , Disp: , Rfl:     busPIRone (BUSPAR) 5 mg tablet, Take 1/2 to 1 tab po qhs prn insomnia or situational stress, Disp: 90 Tab, Rfl: 0    busPIRone (BUSPAR) 5 mg tablet, Take 1/2 to 1 tab po qhs prn insomnia or situational stress, Disp: 30 Tab, Rfl: 0     She  has a past medical history of Arthritis, Endometriosis (1/30/2009), Hypertension, Ill-defined condition, Morbid obesity ([facility] [address] Precancerous skin lesion, and Urge incontinence (08/25/2010). She  has a past surgical history that includes hx orthopaedic (2006); hx orthopaedic (2010); hx appendectomy (2010); hx urological (2014,   12/3/15); hx colonoscopy (11/08/2015); hx hysterectomy (1998); hx gyn; hx gyn; and hx orthopaedic (Right, 10/19/2017). She family history includes Cancer in her father and sister; Heart Disease (age of onset: 68) in her mother; Prostate Cancer in her brother. She  reports that she has never smoked. She has never used smokeless tobacco. She reports current alcohol use of about 1.7 standard drinks of alcohol per week. She reports that she does not use drugs.      Review of Systems:  Constitutional:  No significant weight loss or weight gain  Eyes:  No blurred vision  CVS:  No significant chest pain  Pulm:  No significant shortness of breath  GI:  No significant nausea or vomiting  :  No significant nocturia  Musculoskeletal:  No significant joint pain at night  Skin:  No significant rashes  Neuro:  No significant dizziness   Psych:  No active mood issues    Sleep Review of Systems: notable for no difficulty falling asleep; infrequent awakenings at night;  regular dreaming noted; no nightmares ; no early morning headaches; no memory problems; no concentration issues; no history of any automobile or occupational accidents due to daytime drowsiness. Objective:   Vitals reported by patient to Medical Assistant    Visit Vitals  Ht 5' 3\" (1.6 m)   Wt 211 lb (95.7 kg)   BMI 37.38 kg/m²          Physical Exam completed by visual and auditory observation of patient with verbal input from patient. General:   Alert, oriented, not in acute distress   Eyes:  Anicteric Sclerae; no obvious strabismus   Nose:  No obvious nasal septum deviation    Neck:   Midline trachea, no visible mass   Chest/Lungs:  Respiratory effort normal, no visualized signs of difficulty breathing or respiratory distress   CVS:  No JVD   Extremities:  No obvious rashes noted on face, neck, or hands   Neuro:  No facial asymmetry, no focal deficits; no obvious tremor    Psych:  Normal affect,  normal countenance       Assessment:       ICD-10-CM ICD-9-CM    1. DRE (obstructive sleep apnea) G47.33 327.23 SLEEP STUDY UNATTENDED, 4 CHANNEL   2. BMI 37.0-37.9, adult Z68.37 V85.37    3. Essential hypertension I10 401.9          Plan:        Sleep testing was ordered for initial evaluation.  She was provided information on sleep apnea including coresponding risk factors and the importance of proper treatment.  Treatment options if indicated were reviewed today. Patient agrees to a trial of PAP therapy if indicated.  Counseling was provided regarding proper sleep hygiene, appropriate sleep schedule, need for sleep environment safety and safe driving.  Recommended a dedicated weight loss program through appropriate diet and exercise regimen as significant weight reduction has been shown to reduce severity of obstructive sleep apnea. Patient's phone number [phone] (home)  was reviewed and confirmed for accuracy. She gives permission for messages regarding results and appointments to be left at that number.     Pursuant to the emergency declaration under the [address] and the National Emergencies Act, [address] [facility] waiver authority and the [employer] and [employer] Act, this Virtual Visit was conducted, with patient's consent, to reduce the patient's risk of exposure to COVID-19 and provide continuity of care for an established patient. Services were provided through a video synchronous discussion virtually to substitute for in-person clinic visit. Guy Martinez MD, FAASM  Electronically signed.  04/20/20

## 2020-04-20 NOTE — PATIENT INSTRUCTIONS
[address] [place]. [address] [facility], [address] Tel.  [phone] Fax. 100 [facility] 60 [place], [address] [facility] Tel.  [phone] Fax. [phone] [address] Tel.  [phone] Fax. [phone] Sleep Apnea: After Your Visit Your Care Instructions Sleep apnea occurs when you frequently stop breathing for 10 seconds or longer during sleep. It can be mild to severe, based on the number of times per hour that you stop breathing or have slowed breathing. Blocked or narrowed airways in your nose, mouth, or throat can cause sleep apnea. Your airway can become blocked when your throat muscles and tongue relax during sleep. Sleep apnea is common, occurring in 1 out of 20 individuals. Individuals having any of the following characteristics should be evaluated and treated right away due to high risk and detrimental consequences from untreated sleep apnea: 
1. Obesity 2. Congestive Heart failure 3. Atrial Fibrillation 4. Uncontrolled Hypertension 5. Type II Diabetes 6. Night-time Arrhythmias 7. Stroke 8. Pulmonary Hypertension 9. High-risk Driving Populations (pilots, truck drivers, etc.) 10. Patients Considering Weight-loss Surgery How do you know you have sleep apnea? You probably have sleep apnea if you answer 'yes' to 3 or more of the following questions: S - Have you been told that you Snore? T - Are you often Tired during the day? O - Has anyone Observed you stop breathing while sleeping? P- Do you have (or are being treated for) high blood Pressure? B - Are you obese (Body Mass Index > 35)? A - Is your Age 48years old or older? N - Is your Neck size greater than 16 inches? G - Are you male Gender? A sleep physician can prescribe a breathing device that prevents tissues in the throat from blocking your airway.  Or your doctor may recommend using a dental device (oral breathing device) to help keep your airway open. In some cases, surgery may be needed to remove enlarged tissues in the throat. Follow-up care is a key part of your treatment and safety. Be sure to make and go to all appointments, and call your doctor if you are having problems. It's also a good idea to know your test results and keep a list of the medicines you take. How can you care for yourself at home? · Lose weight, if needed. It may reduce the number of times you stop breathing or have slowed breathing. · Go to bed at the same time every night. · Sleep on your side. It may stop mild apnea. If you tend to roll onto your back, sew a pocket in the back of your pajama top. Put a tennis ball into the pocket, and stitch the pocket shut. This will help keep you from sleeping on your back. · Avoid alcohol and medicines such as sleeping pills and sedatives before bed. · Do not smoke. Smoking can make sleep apnea worse. If you need help quitting, talk to your doctor about stop-smoking programs and medicines. These can increase your chances of quitting for good. · Prop up the head of your bed 4 to 6 inches by putting bricks under the legs of the bed. · Treat breathing problems, such as a stuffy nose, caused by a cold or allergies. · Use a continuous positive airway pressure (CPAP) breathing machine if lifestyle changes do not help your apnea and your doctor recommends it. The machine keeps your airway from closing when you sleep. · If CPAP does not help you, ask your doctor whether you should try other breathing machines. A bilevel positive airway pressure machine has two types of air pressureâone for breathing in and one for breathing out. Another device raises or lowers air pressure as needed while you breathe. · If your nose feels dry or bleeds when using one of these machines, talk with your doctor about increasing moisture in the air. A humidifier may help.  
· If your nose is runny or stuffy from using a breathing machine, talk with your doctor about using decongestants or a corticosteroid nasal spray. When should you call for help? Watch closely for changes in your health, and be sure to contact your doctor if: 
· You still have sleep apnea even though you have made lifestyle changes. · You are thinking of trying a device such as CPAP. · You are having problems using a CPAP or similar machine. Where can you learn more? Go to [employer]. Enter U448 in the search box to learn more about \"Sleep Apnea: After Your Visit. \"  
© 1478-9201 Healthwise, Incorporated. Care instructions adapted under license by [employer] (which disclaims liability or warranty for this information). This care instruction is for use with your licensed healthcare professional. If you have questions about a medical condition or this instruction, always ask your healthcare professional. [place] [employer] any warranty or liability for your use of this information. PROPER SLEEP HYGIENE What to avoid · Do not have drinks with caffeine, such as coffee or black tea, for 8 hours before bed. · Do not smoke or use other types of tobacco near bedtime. Nicotine is a stimulant and can keep you awake. · Avoid drinking alcohol late in the evening, because it can cause you to wake in the middle of the night. · Do not eat a big meal close to bedtime. If you are hungry, eat a light snack. · Do not drink a lot of water close to bedtime, because the need to urinate may wake you up during the night. · Do not read or watch TV in bed. Use the bed only for sleeping and sexual activity. What to try · Go to bed at the same time every night, and wake up at the same time every morning. Do not take naps during the day. · Keep your bedroom quiet, dark, and cool. · Get regular exercise, but not within 3 to 4 hours of your bedtime. Shad Mitchell · Sleep on a comfortable pillow and mattress. · If watching the clock makes you anxious, turn it facing away from you so you cannot see the time. · If you worry when you lie down, start a worry book. Well before bedtime, write down your worries, and then set the book and your concerns aside. · Try meditation or other relaxation techniques before you go to bed. · If you cannot fall asleep, get up and go to another room until you feel sleepy. Do something relaxing. Repeat your bedtime routine before you go to bed again. · Make your house quiet and calm about an hour before bedtime. Turn down the lights, turn off the TV, log off the computer, and turn down the volume on music. This can help you relax after a busy day. Drowsy Driving The [place] [address] cites drowsiness as a causing factor in more than 019,731 police reported crashes annually, resulting in 76,000 injuries and 1,500 deaths. Other surveys suggest 55% of people polled have driven while drowsy in the past year, 23% had fallen asleep but not crashed, 3% crashed, and 2% had and accident due to drowsy driving. Who is at risk? Young Drivers: One study of drowsy driving accidents states that 55% of the drivers were under 25 years. Of those, 75% were male. Shift Workers and Travelers: People who work overnight or travel across time zones frequently are at higher risk of experiencing Circadian Rhythm Disorders. They are trying to work and function when their body is programed to sleep. Sleep Deprived: Lack of sleep has a serious impact on your ability to pay attention or focus on a task. Consistently getting less than the average of 8 hours your body needs creates partial or cumulative sleep deprivation. Untreated Sleep Disorders: Sleep Apnea, Narcolepsy, R.L.S., and other sleep disorders (untreated) prevent a person from getting enough restful sleep.  This leads to excessive daytime sleepiness and increases the risk for drowsy driving accidents by up to 7 times. Medications / Alcohol: Even over the counter medications can cause drowsiness. Medications that impair a drivers attention should have a warning label. Alcohol naturally makes you sleepy and on its own can cause accidents. Combined with excessive drowsiness its effects are amplified. Signs of Drowsy Driving: * You don't remember driving the last few miles * You may drift out of your cody * You are unable to focus and your thoughts wander * You may yawn more often than normal 
 * You have difficulty keeping your eyes open / nodding off * Missing traffic signs, speeding, or tailgating Prevention-  
Good sleep hygiene, lifestyle and behavioral choices have the most impact on drowsy driving. There is no substitute for sleep and the average person requires 8 hours nightly. If you find yourself driving drowsy, stop and sleep. Consider the sleep hygiene tips provided during your visit as well. Medication Refill Policy: Refills for all medications require 1 week advance notice. Please have your pharmacy fax a refill request. We are unable to fax, or call in \"controled substance\" medications and you will need to pick these prescriptions up from our office. [employer] Activation Thank you for requesting access to [employer] Please follow the instructions below to securely access and download your online medical record. [employer] allows you to send messages to your doctor, view your test results, renew your prescriptions, schedule appointments, and more. How Do I Sign Up? 1. In your internet browser, go to [email or web address]. 2. Click on the First Time User? Click Here link in the Sign In box. You will see the New Member Sign Up page. 3. Enter your [employer] Access Code exactly as it appears below. You will not need to use this code after youve completed the sign-up process.  If you do not sign up before the expiration date, you must request a new code. [employer] Access Code: Activation code not generated Current [employer] Status: Active (This is the date your [employer] access code will ) 4. Enter the last four digits of your Social Security Number (xxxx) and Date of Birth (mm/dd/yyyy) as indicated and click Submit. You will be taken to the next sign-up page. 5. Create a [employer]ot ID. This will be your [employer] login ID and cannot be changed, so think of one that is secure and easy to remember. 6. Create a [employer] password. You can change your password at any time. 7. Enter your Password Reset Question and Answer. This can be used at a later time if you forget your password. 8. Enter your e-mail address. You will receive e-mail notification when new information is available in [address] 9. Click Sign Up. You can now view and download portions of your medical record. 10. Click the Download Summary menu link to download a portable copy of your medical information. Additional Information If you have questions, please call [phone]. Remember, [employer] is NOT to be used for urgent needs. For medical emergencies, dial 911.

## 2020-04-21 NOTE — PROGRESS NOTES
I sent patient a "[employer]" message. I will try and reach out to her as well. May need to send her to nephrology.

## 2020-04-22 LAB — RENIN PLAS-CCNC: 13.55 NG/ML/HR (ref 0.17–5.38)

## 2020-04-24 DIAGNOSIS — I10 ESSENTIAL HYPERTENSION: Primary | ICD-10-CM

## 2020-04-24 RX ORDER — AMLODIPINE BESYLATE 5 MG/1
5 TABLET ORAL DAILY
Qty: 30 TAB | Refills: 0 | Status: SHIPPED | OUTPATIENT
Start: 2020-04-24 | End: 2020-05-17 | Stop reason: SDUPTHER

## 2020-04-24 RX ORDER — LISINOPRIL 40 MG/1
40 TABLET ORAL DAILY
Qty: 90 TAB | Refills: 0 | Status: SHIPPED | OUTPATIENT
Start: 2020-04-24 | End: 2022-10-25

## 2020-04-24 RX ORDER — SPIRONOLACTONE 25 MG/1
25 TABLET ORAL DAILY
Qty: 30 TAB | Refills: 0 | Status: SHIPPED | OUTPATIENT
Start: 2020-04-24 | End: 2020-05-13 | Stop reason: SDUPTHER

## 2020-05-01 ENCOUNTER — VIRTUAL VISIT (OUTPATIENT)
Dept: INTERNAL MEDICINE CLINIC | Age: 65
End: 2020-05-01

## 2020-05-01 DIAGNOSIS — I10 ESSENTIAL HYPERTENSION: Primary | ICD-10-CM

## 2020-05-01 DIAGNOSIS — G47.30 SLEEP APNEA, UNSPECIFIED TYPE: ICD-10-CM

## 2020-05-01 RX ORDER — CLONIDINE HYDROCHLORIDE 0.1 MG/1
TABLET ORAL
Qty: 180 TAB | Refills: 1 | Status: SHIPPED | OUTPATIENT
Start: 2020-05-01 | End: 2020-05-26

## 2020-05-01 NOTE — PROGRESS NOTES
Consent: Linh Rawls, who was seen by synchronous (real-time) audio-video technology, and/or her healthcare decision maker, is aware that this patient-initiated, Telehealth encounter on 5/1/2020 is a billable service, with coverage as determined by her insurance carrier. She is aware that she may receive a bill and has provided verbal consent to proceed: YES      712  Assessment & Plan:     Diagnoses and all orders for this visit:    1. Essential hypertension  Improving but not optimally controlled. Her evening blood pressure readings are increasing. She may benefit from 0.1 mg in the afternoon. She will see nephrology on May 12. The clonidine will at least bridge her until that appointment. Discontinue amlodipine as this did not improve blood pressure and cause some edema  -     cloNIDine HCL (CATAPRES) 0.1 mg tablet; Take 2 tabs bid but take 1 tab in the afternoon  Indications: high blood pressure    2. Sleep apnea, unspecified type  I think patient has some underlying sleep apnea. Encouraged her to follow-up with the sleep clinic when they open from the COVID precautions        Subjective:   Linh Rawls is a 71 y.o. female who was seen for Hypertension        Subjective:   Linh Rawls is a 71 y.o. female with hypertension. Hypertension ROS: taking medications as instructed, no medication side effects noted, no TIA's, no chest pain on exertion, no dyspnea on exertion,  swelling of ankles. New concerns: She is no longer on amlodipine as this was not helpful and it was causing some swelling in her ankles. .  She does not seem to think that the spironolactone is helping her very much. She notes that her blood pressure does respond to clonidine.   Before the clonidine bp was 4/26 thorugh the day  167/75, 179/72, 182 /81, 151/99  Took amlodipine 171/81  Took 2 clonidine on 4/27 156/75, 141/69, before clonidine 158/79 after clonidine 126/57  4/28 127/67, stayed down for 5 hours in the am 149/53 Sleep apnea  Patient reports that she did some initial paperwork and evaluation but is still awaiting follow-up appointments. She does have some edema but it is related to the amlodipine. Situational stress  Cousin  of covid  Her  is working for [employer] and helping with material protection against viruses. bmi 36.9  Did not discuss  She has lost close to 50 pounds with Lida Anderson /Dr. May Jhaveri with [place] [facility]  Energy level good   She is back on her Saxenda. SUBJECTIVE: Charlie Rivera is a 71 y.o. female here for follow up of hypothyroidism. Lab Results   Component Value Date/Time    TSH 1.090 2019 09:33 AM     Thyroid ROS: denies fatigue, weight changes, heat/cold intolerance, bowel/skin changes or CVS symptoms. Patient notes that she thinks something may be off it may be her thyroid. She is a little bit off from her baseline    Incontinence  Patient is on spironolactone but no complaints of increased micturition or nocturia  Patient is working with Dr. Alanna Schilling and Dr. Hallie Calvo. Her interstim is working well. Current Outpatient Medications   Medication Sig    montelukast (SINGULAIR) 10 mg tablet TAKE 1 TABLET DAILY    lisinopriL (PRINIVIL, ZESTRIL) 40 mg tablet Take 1 Tab by mouth daily.  spironolactone (ALDACTONE) 25 mg tablet Take 1 Tab by mouth daily.  amLODIPine (NORVASC) 5 mg tablet Take 1 Tab by mouth daily.  cloNIDine HCL (CATAPRES) 0.1 mg tablet Take 2 Tabs by mouth two (2) times a day. Indications: high blood pressure    atorvastatin (LIPITOR) 10 mg tablet Take 1 Tab by mouth daily. Please increase to 2 po daily if tolerated    furosemide (LASIX) 40 mg tablet Take 1 Tab by mouth daily.  liraglutide, weight loss, (SAXENDA) 3 mg/0.5 mL (18 mg/3 mL) pen 3 mg by SubCUTAneous route daily.  fluticasone propionate (FLONASE) 50 mcg/actuation nasal spray 2 Sprays by Both Nostrils route daily.     cyanocobalamin (VITAMIN B12) 500 mcg tablet TAKE 2 TABLETS BY MOUTH EVERY DAY    fluticasone propion-salmeterol (ADVAIR/WIXELA) 250-50 mcg/dose diskus inhaler Take 1 Puff by inhalation two (2) times a day. MUST rinse mouth after use (Patient taking differently: Take 1 Puff by inhalation as needed. MUST rinse mouth after use)    Biotin 2,500 mcg cap Take  by mouth.  buPROPion XL (Wellbutrin XL) 300 mg XL tablet Take 200 mg by mouth two (2) times a day. 200mg BID    thyroid, Pork, (ARMOUR THYROID) 90 mg tablet Take 1 Tab by mouth daily. Indications: hypothyroidism    meloxicam (MOBIC) 15 mg tablet Take 1 Tab by mouth daily. DR. Patrica Washington    diphenoxylate-atropine (LOMOTIL) 2.5-0.025 mg per tablet Take 1 Tab by mouth three (3) times daily as needed for Diarrhea. Max Daily Amount: 3 Tabs.  hydrocortisone (ANUSOL-HC) 2.5 % rectal cream Insert  into rectum four (4) times daily. (Patient taking differently: Insert  into rectum as needed.)    omega-3 fatty acids-vitamin e (FISH OIL) 1,000 mg Cap Take 3 capsules by mouth daily.  cholecalciferol, vitamin d3, (VITAMIN D) 1,000 unit tablet Take 2,000 Units by mouth daily.  busPIRone (BUSPAR) 5 mg tablet Take 1/2 to 1 tab po qhs prn insomnia or situational stress    busPIRone (BUSPAR) 5 mg tablet Take 1/2 to 1 tab po qhs prn insomnia or situational stress     No current facility-administered medications for this visit.         Allergies   Allergen Reactions    Codeine Nausea and Vomiting    Erythromycin Rash    Hydrocodone Rash     Subjective    Past Medical History:   Diagnosis Date    Arthritis     total knee    Endometriosis 1/30/2009    resolved with hysterectomy    Hypertension     Ill-defined condition     IBS    Morbid obesity ([facility] [address]     Precancerous skin lesion     wang    Urge incontinence 08/25/2010    abdullahi michell; botox       ROS  All other systems reviewed and negative, unless mentioned in HPI    Objective:   Vital Signs: (As obtained by patient/caregiver at home)  There were no vitals taken for this visit. [INSTRUCTIONS:  \"[x]\" Indicates a positive item  \"[]\" Indicates a negative item  -- DELETE ALL ITEMS NOT EXAMINED]    Constitutional: [x] Appears well-developed and well-nourished [x] No apparent distress      [] Abnormal -     Mental status: [x] Alert and awake  [x] Oriented to person/place/time [x] Able to follow commands    [] Abnormal -     Eyes:   EOM    [x]  Normal    [] Abnormal -   Sclera  [x]  Normal    [] Abnormal -          Discharge [x]  None visible   [] Abnormal -     HENT: [x] Normocephalic, atraumatic  [] Abnormal -   [x] Mouth/Throat: Mucous membranes are moist    External Ears [x] Normal  [] Abnormal -    Neck: [x] No visualized mass [] Abnormal -     Pulmonary/Chest: [x] Respiratory effort normal   [x] No visualized signs of difficulty breathing or respiratory distress        [] Abnormal -      Musculoskeletal:   [x] Normal gait with no signs of ataxia         [x] Normal range of motion of neck        [] Abnormal -     Neurological:        [x] No Facial Asymmetry (Cranial nerve 7 motor function) (limited exam due to video visit)          [x] No gaze palsy        [] Abnormal -          Skin:        [x] No significant exanthematous lesions or discoloration noted on facial skin         [] Abnormal -            Psychiatric:       [x] Normal Affect [] Abnormal -        [x] No Hallucinations    Other pertinent observable physical exam findings:-      We discussed the expected course, resolution and complications of the diagnosis(es) in detail. Medication risks, benefits, costs, interactions, and alternatives were discussed as indicated. I advised her to contact the office if her condition worsens, changes or fails to improve as anticipated. She expressed understanding with the diagnosis(es) and plan. Reji Almeida is a 71 y.o. female being evaluated by a video visit encounter for concerns as above. A caregiver was present when appropriate.  Due to this being a TeleHealth encounter (During CGICZ-55 public health emergency), evaluation of the following organ systems was limited: Vitals/Constitutional/EENT/Resp/CV/GI//MS/Neuro/Skin/Heme-Lymph-Imm. Pursuant to the emergency declaration under the [address] [place] [address], [facility] waiver authority and the [employer] and [employer] Act, this Virtual  Visit was conducted, with patient's (and/or legal guardian's) consent, to reduce the patient's risk of exposure to COVID-19 and provide necessary medical care. Services were provided through a video synchronous discussion virtually to substitute for in-person clinic visit. Patient and provider were located at their individual homes.     Fredis Chairez MD

## 2020-05-01 NOTE — Clinical Note
Nemesio Bruno More, I hope you and your family are well. I sent referral for pt. I was having a hard time getting her bp under control but now better but given her risk factors just wanted to to check checked for sleep apnea when your clinic opens up.  Thanks and take care, Mayank Saul

## 2020-05-06 ENCOUNTER — HOSPITAL ENCOUNTER (OUTPATIENT)
Dept: LAB | Age: 65
Discharge: HOME | End: 2020-05-06

## 2020-05-06 DIAGNOSIS — I10 ESSENTIAL HYPERTENSION: ICD-10-CM

## 2020-05-06 DIAGNOSIS — N39.0 URINARY TRACT INFECTION WITHOUT HEMATURIA, SITE UNSPECIFIED: ICD-10-CM

## 2020-05-06 DIAGNOSIS — N39.0 URINARY TRACT INFECTION WITHOUT HEMATURIA, SITE UNSPECIFIED: Primary | ICD-10-CM

## 2020-05-06 LAB
ALBUMIN SERPL-MCNC: 3.6 G/DL (ref 3.5–5)
ALBUMIN/GLOB SERPL: 1.3 {RATIO} (ref 1.1–2.2)
ALP SERPL-CCNC: 45 U/L (ref 45–117)
ALT SERPL-CCNC: 40 U/L (ref 12–78)
ANION GAP SERPL CALC-SCNC: 4 MMOL/L (ref 5–15)
APPEARANCE UR: ABNORMAL
AST SERPL-CCNC: 16 U/L (ref 15–37)
BACTERIA URNS QL MICRO: NEGATIVE /HPF
BILIRUB SERPL-MCNC: 0.3 MG/DL (ref 0.2–1)
BILIRUB UR QL: NEGATIVE
BUN SERPL-MCNC: 27 MG/DL (ref 6–20)
BUN/CREAT SERPL: 27 (ref 12–20)
CALCIUM SERPL-MCNC: 9.7 MG/DL (ref 8.5–10.1)
CAOX CRY URNS QL MICRO: ABNORMAL
CHLORIDE SERPL-SCNC: 111 MMOL/L (ref 97–108)
CO2 SERPL-SCNC: 26 MMOL/L (ref 21–32)
COLOR UR: ABNORMAL
COMMENT, HOLDF: NORMAL
CREAT SERPL-MCNC: 1 MG/DL (ref 0.55–1.02)
EPITH CASTS URNS QL MICRO: ABNORMAL /LPF
GLOBULIN SER CALC-MCNC: 2.7 G/DL (ref 2–4)
GLUCOSE SERPL-MCNC: 96 MG/DL (ref 65–100)
GLUCOSE UR STRIP.AUTO-MCNC: NEGATIVE MG/DL
HGB UR QL STRIP: NEGATIVE
KETONES UR QL STRIP.AUTO: ABNORMAL MG/DL
LEUKOCYTE ESTERASE UR QL STRIP.AUTO: ABNORMAL
MAGNESIUM SERPL-MCNC: 1.7 MG/DL (ref 1.6–2.4)
NITRITE UR QL STRIP.AUTO: NEGATIVE
PH UR STRIP: 5 [PH] (ref 5–8)
POTASSIUM SERPL-SCNC: 4.2 MMOL/L (ref 3.5–5.1)
PROT SERPL-MCNC: 6.3 G/DL (ref 6.4–8.2)
PROT UR STRIP-MCNC: NEGATIVE MG/DL
RBC #/AREA URNS HPF: ABNORMAL /HPF (ref 0–5)
SAMPLES BEING HELD,HOLD: NORMAL
SODIUM SERPL-SCNC: 141 MMOL/L (ref 136–145)
SP GR UR REFRACTOMETRY: 1.02 (ref 1–1.03)
UROBILINOGEN UR QL STRIP.AUTO: 0.2 EU/DL (ref 0.2–1)
WBC URNS QL MICRO: ABNORMAL /HPF (ref 0–4)

## 2020-05-06 RX ORDER — CIPROFLOXACIN 500 MG/1
500 TABLET ORAL 2 TIMES DAILY
Qty: 14 TAB | Refills: 0 | Status: SHIPPED | OUTPATIENT
Start: 2020-05-06 | End: 2020-08-06 | Stop reason: ALTCHOICE

## 2020-05-11 LAB — RENIN PLAS-CCNC: 11.32 NG/ML/HR (ref 0.17–5.38)

## 2020-05-17 DIAGNOSIS — I10 ESSENTIAL HYPERTENSION: ICD-10-CM

## 2020-05-17 RX ORDER — AMLODIPINE BESYLATE 5 MG/1
5 TABLET ORAL DAILY
Qty: 90 TAB | Refills: 1 | Status: SHIPPED | OUTPATIENT
Start: 2020-05-17 | End: 2020-05-17 | Stop reason: ALTCHOICE

## 2020-05-17 RX ORDER — SPIRONOLACTONE 25 MG/1
25 TABLET ORAL DAILY
Qty: 90 TAB | Refills: 0 | Status: SHIPPED | OUTPATIENT
Start: 2020-05-17 | End: 2020-07-27 | Stop reason: SDUPTHER

## 2020-05-19 RX ORDER — FLUTICASONE PROPIONATE 50 MCG
SPRAY, SUSPENSION (ML) NASAL
Qty: 1 BOTTLE | Refills: 1 | Status: SHIPPED | OUTPATIENT
Start: 2020-05-19 | End: 2020-06-11

## 2020-06-03 ENCOUNTER — PATIENT MESSAGE (OUTPATIENT)
Dept: INTERNAL MEDICINE CLINIC | Age: 65
End: 2020-06-03

## 2020-06-03 DIAGNOSIS — E78.2 MIXED HYPERLIPIDEMIA: Primary | ICD-10-CM

## 2020-06-03 RX ORDER — ATORVASTATIN CALCIUM 20 MG/1
20 TABLET, FILM COATED ORAL DAILY
Qty: 30 TAB | Refills: 0 | Status: SHIPPED | OUTPATIENT
Start: 2020-06-03 | End: 2020-06-26

## 2020-06-03 NOTE — TELEPHONE ENCOUNTER
From: Almita Byrne  To: Cinthia Plunkett MD  Sent: 6/3/2020 1:20 PM EDT  Subject: Prescription Question    please send in a refill for 20 mg Atorvastatin, I have been doubling the prescription as directed and need a refill. [facility] advised would have to have [facility] prescription for 20 mg - we are on vacation starting Sat and need refill prior.  Thank you  Latrice Kumar

## 2020-06-11 RX ORDER — FLUTICASONE PROPIONATE 50 MCG
SPRAY, SUSPENSION (ML) NASAL
Qty: 1 BOTTLE | Refills: 1 | Status: SHIPPED | OUTPATIENT
Start: 2020-06-11 | End: 2020-10-01

## 2020-06-26 ENCOUNTER — PATIENT MESSAGE (OUTPATIENT)
Dept: INTERNAL MEDICINE CLINIC | Age: 65
End: 2020-06-26

## 2020-06-26 DIAGNOSIS — E78.2 MIXED HYPERLIPIDEMIA: ICD-10-CM

## 2020-06-26 RX ORDER — ATORVASTATIN CALCIUM 20 MG/1
TABLET, FILM COATED ORAL
Qty: 30 TAB | Refills: 0 | Status: SHIPPED | OUTPATIENT
Start: 2020-06-26 | End: 2020-07-23 | Stop reason: SDUPTHER

## 2020-07-07 DIAGNOSIS — I10 ESSENTIAL HYPERTENSION: ICD-10-CM

## 2020-07-07 RX ORDER — OLMESARTAN MEDOXOMIL 40 MG/1
TABLET ORAL
Qty: 90 TAB | Refills: 0 | Status: SHIPPED | OUTPATIENT
Start: 2020-07-07 | End: 2020-08-06 | Stop reason: ALTCHOICE

## 2020-07-14 ENCOUNTER — HOSPITAL ENCOUNTER (OUTPATIENT)
Dept: MAMMOGRAPHY | Age: 65
Discharge: HOME | End: 2020-07-14
Attending: INTERNAL MEDICINE
Payer: COMMERCIAL

## 2020-07-14 DIAGNOSIS — Z12.31 VISIT FOR SCREENING MAMMOGRAM: ICD-10-CM

## 2020-07-14 PROCEDURE — 77067 SCR MAMMO BI INCL CAD: CPT

## 2020-07-23 DIAGNOSIS — I10 ESSENTIAL HYPERTENSION: ICD-10-CM

## 2020-07-23 DIAGNOSIS — E78.2 MIXED HYPERLIPIDEMIA: ICD-10-CM

## 2020-07-23 RX ORDER — CLONIDINE HYDROCHLORIDE 0.1 MG/1
TABLET ORAL
Qty: 180 TAB | Refills: 0 | Status: SHIPPED | OUTPATIENT
Start: 2020-07-23 | End: 2020-09-10

## 2020-07-23 RX ORDER — ATORVASTATIN CALCIUM 20 MG/1
TABLET, FILM COATED ORAL
Qty: 30 TAB | Refills: 0 | Status: SHIPPED | OUTPATIENT
Start: 2020-07-23 | End: 2020-07-25 | Stop reason: SDUPTHER

## 2020-07-27 DIAGNOSIS — I10 ESSENTIAL HYPERTENSION: ICD-10-CM

## 2020-07-27 DIAGNOSIS — E78.2 MIXED HYPERLIPIDEMIA: ICD-10-CM

## 2020-07-27 RX ORDER — ATORVASTATIN CALCIUM 20 MG/1
TABLET, FILM COATED ORAL
Qty: 30 TAB | Refills: 0 | Status: CANCELLED | OUTPATIENT
Start: 2020-07-27

## 2020-07-28 RX ORDER — ATORVASTATIN CALCIUM 20 MG/1
TABLET, FILM COATED ORAL
Qty: 90 TAB | Refills: 0 | Status: SHIPPED | OUTPATIENT
Start: 2020-07-28 | End: 2020-11-25 | Stop reason: SDUPTHER

## 2020-07-28 RX ORDER — SPIRONOLACTONE 25 MG/1
25 TABLET ORAL DAILY
Qty: 90 TAB | Refills: 0 | Status: SHIPPED | OUTPATIENT
Start: 2020-07-28

## 2020-07-30 ENCOUNTER — HOSPITAL ENCOUNTER (OUTPATIENT)
Dept: CT IMAGING | Age: 65
Discharge: HOME | End: 2020-07-30
Attending: ORTHOPAEDIC SURGERY
Payer: COMMERCIAL

## 2020-07-30 DIAGNOSIS — M17.11 OSTEOARTHRITIS OF RIGHT KNEE: ICD-10-CM

## 2020-07-30 PROCEDURE — 73700 CT LOWER EXTREMITY W/O DYE: CPT

## 2020-08-06 ENCOUNTER — VIRTUAL VISIT (OUTPATIENT)
Dept: INTERNAL MEDICINE CLINIC | Age: 65
End: 2020-08-06
Payer: COMMERCIAL

## 2020-08-06 DIAGNOSIS — R45.1 RESTLESS: Primary | ICD-10-CM

## 2020-08-06 DIAGNOSIS — E78.2 MIXED HYPERLIPIDEMIA: ICD-10-CM

## 2020-08-06 DIAGNOSIS — R45.1 RESTLESS: ICD-10-CM

## 2020-08-06 PROCEDURE — 99214 OFFICE O/P EST MOD 30 MIN: CPT | Performed by: INTERNAL MEDICINE

## 2020-08-06 NOTE — PROGRESS NOTES
Consent: Marta Rincon, who was seen by synchronous (real-time) audio-video technology, and/or her healthcare decision maker, is aware that this patient-initiated, Telehealth encounter on 8/6/2020 is a billable service, with coverage as determined by her insurance carrier. She is aware that she may receive a bill and has provided verbal consent to proceed: YES      712  Assessment & Plan:     Diagnoses and all orders for this visit:    1. Essential hypertension  Continue on medications. I do not think the increase in amlodipine has caused her sleep issues. She will continue to see her nephrologist.  Further, I discussed with her that he will be writing for all of her blood pressure medications. The pharmacy was sending the requests which I will now refused. She is aware and agreeable. 2. Sleep apnea, unspecified type  Patient had an initial evaluation from sleep studies but a sleep test was not set up yet. Fatigue/restlessness  We will check labs  Possibly related to sleep apnea. Alopecia  We will recheck thyroid and CBC  She will also follow-up with dermatology. Subjective:   Marta Rincon is a 76 y.o. female who was seen for Hypertension        Subjective:   Marta Rincon is a 76 y.o. female with hypertension  7/16 doubled amlodipine and this past Sunday swelling so went back to 5 mg  She thinks may be parathryoid    Alopecia  Extreme hairloss all of a sudden. She denies period of stress. Restlessness not being able to sleep   Taking naps during the day  Chronic naps lethargic  hyeractivity sensation. She will have calcium recheck and parathyroid. Upcoming surgeries right knee total knee sept  Bone graft on tooth in August      Hypertension ROS: taking medications as instructed, no medication side effects noted, no TIA's, no chest pain on exertion, no dyspnea on exertion,  swelling of ankles. New concerns: She reports she is having some labile blood pressure again.   She is being followed by nephrology. She reports she has been having some swings in the 160s but also back down to the 120s. Sleep apnea  She has not had a follow-up sleep test.      Situational stress  Cousin  of covid  Her  is working for [employer] and helping with material protection against viruses. bmi 36.9  Did not discuss  She has lost close to 50 pounds with Nathan Hartman /Dr. Albino Mike with [facility]  Energy level good   He is not on Saxenda. She has not followed up with [facility] yet    SUBJECTIVE: Robert Garvin is a 71 y.o. female here for follow up of hypothyroidism. Lab Results   Component Value Date/Time    TSH 1.090 2019 09:33 AM     Thyroid ROS: As noted above patient having some restlessness and alopecia. Incontinence  Did not discuss no complaints. Current Outpatient Medications   Medication Sig    spironolactone (ALDACTONE) 25 mg tablet Take 1 Tab by mouth daily.  atorvastatin (LIPITOR) 20 mg tablet TAKE 1 TABLET BY MOUTH EVERY DAY    cloNIDine HCL (CATAPRES) 0.1 mg tablet TAKE 2 TABLETS BY MOUTH TWICE A DAY BUT TAKE 1 IN THE AFTERNOON (Patient taking differently: TAKE 2 TABLETS BY MOUTH IN THE MORNING, 1 TAB AT LUNCH, AND 2 AT BEDTIME)    fluticasone propionate (FLONASE) 50 mcg/actuation nasal spray SPRAY 2 SPRAYS INTO EACH NOSTRIL EVERY DAY    diphenoxylate-atropine (LOMOTIL) 2.5-0.025 mg per tablet Take 1 Tab by mouth three (3) times daily as needed for Diarrhea. Max Daily Amount: 3 Tabs.  amLODIPine (NORVASC) 5 mg tablet TAKE 1 TABLET BY MOUTH EVERY DAY (Patient taking differently: 10 mg.)    montelukast (SINGULAIR) 10 mg tablet TAKE 1 TABLET DAILY    lisinopriL (PRINIVIL, ZESTRIL) 40 mg tablet Take 1 Tab by mouth daily.     cyanocobalamin (VITAMIN B12) 500 mcg tablet TAKE 2 TABLETS BY MOUTH EVERY DAY    fluticasone propion-salmeterol (ADVAIR/WIXELA) 250-50 mcg/dose diskus inhaler Take 1 Puff by inhalation two (2) times a day. MUST rinse mouth after use (Patient taking differently: Take 1 Puff by inhalation as needed. MUST rinse mouth after use)    Biotin 2,500 mcg cap Take  by mouth.  buPROPion XL (Wellbutrin XL) 300 mg XL tablet Take 200 mg by mouth two (2) times a day. 200mg BID    thyroid, Pork, (ARMOUR THYROID) 90 mg tablet Take 1 Tab by mouth daily. Indications: hypothyroidism    meloxicam (MOBIC) 15 mg tablet Take 1 Tab by mouth daily. DR. Raghav Rinaldi    omega-3 fatty acids-vitamin e (FISH OIL) 1,000 mg Cap Take 3 capsules by mouth daily.  cholecalciferol, vitamin d3, (VITAMIN D) 1,000 unit tablet Take 2,000 Units by mouth daily.  olmesartan (BENICAR) 40 mg tablet TAKE 1 TABLET BY MOUTH EVERY DAY *REPLACES IRBESARTAN (Patient taking differently: TAKE 1 TABLET BY MOUTH EVERY DAY *REPLACES IRBESARTAN  Indications: not taking)    furosemide (LASIX) 40 mg tablet Take 1 Tab by mouth daily. (Patient taking differently: Take 40 mg by mouth daily. Indications: not taking)    liraglutide, weight loss, (SAXENDA) 3 mg/0.5 mL (18 mg/3 mL) pen 3 mg by SubCUTAneous route daily. Indications: not taking    busPIRone (BUSPAR) 5 mg tablet Take 1/2 to 1 tab po qhs prn insomnia or situational stress    busPIRone (BUSPAR) 5 mg tablet Take 1/2 to 1 tab po qhs prn insomnia or situational stress    hydrocortisone (ANUSOL-HC) 2.5 % rectal cream Insert  into rectum four (4) times daily. (Patient taking differently: Insert  into rectum as needed.)     No current facility-administered medications for this visit.         Allergies   Allergen Reactions    Codeine Nausea and Vomiting    Erythromycin Rash    Hydrocodone Rash     Subjective    Past Medical History:   Diagnosis Date    Arthritis     total knee    Endometriosis 1/30/2009    resolved with hysterectomy    Hypertension     Ill-defined condition     IBS    Morbid obesity ([address]     Precancerous skin lesion     wang    Urge incontinence 08/25/2010    abdullahi galarza; botox ROS  All other systems reviewed and negative, unless mentioned in HPI    Objective:   Vital Signs: (As obtained by patient/caregiver at home)  There were no vitals taken for this visit. [INSTRUCTIONS:  \"[x]\" Indicates a positive item  \"[]\" Indicates a negative item  -- DELETE ALL ITEMS NOT EXAMINED]    Constitutional: [x] Appears well-developed and well-nourished [x] No apparent distress      [] Abnormal -     Mental status: [x] Alert and awake  [x] Oriented to person/place/time [x] Able to follow commands    [] Abnormal -     Eyes:   EOM    [x]  Normal    [] Abnormal -   Sclera  [x]  Normal    [] Abnormal -          Discharge [x]  None visible   [] Abnormal -     HENT: [x] Normocephalic, atraumatic  [] Abnormal -   [x] Mouth/Throat: Mucous membranes are moist    External Ears [x] Normal  [] Abnormal -    Neck: [x] No visualized mass [] Abnormal -     Pulmonary/Chest: [x] Respiratory effort normal   [x] No visualized signs of difficulty breathing or respiratory distress        [] Abnormal -      Musculoskeletal:   [x] Normal gait with no signs of ataxia         [x] Normal range of motion of neck        [] Abnormal -     Neurological:        [x] No Facial Asymmetry (Cranial nerve 7 motor function) (limited exam due to video visit)          [x] No gaze palsy        [] Abnormal -          Skin:        [x] No significant exanthematous lesions or discoloration noted on facial skin         [] Abnormal -            Psychiatric:       [x] Normal Affect [] Abnormal -        [x] No Hallucinations    Other pertinent observable physical exam findings:-      We discussed the expected course, resolution and complications of the diagnosis(es) in detail. Medication risks, benefits, costs, interactions, and alternatives were discussed as indicated. I advised her to contact the office if her condition worsens, changes or fails to improve as anticipated. She expressed understanding with the diagnosis(es) and plan. Christie Llanos is a 76 y.o. female being evaluated by a video visit encounter for concerns as above. A caregiver was present when appropriate. Due to this being a TeleHealth encounter (During ARUJA-72 public [facility] emergency), evaluation of the following organ systems was limited: Vitals/Constitutional/EENT/Resp/CV/GI//MS/Neuro/Skin/Heme-Lymph-Imm. Pursuant to the emergency declaration under the [address] [place] [address], [facility] waiver authority and the [employer] and [employer] Act, this Virtual  Visit was conducted, with patient's (and/or legal guardian's) consent, to reduce the patient's risk of exposure to COVID-19 and provide necessary medical care. Services were provided through a video synchronous discussion virtually to substitute for in-person clinic visit. Patient and provider were located at their individual homes.     Judith Cortés MD

## 2020-08-14 LAB
ALBUMIN SERPL-MCNC: 4.2 G/DL (ref 3.8–4.8)
ALBUMIN/GLOB SERPL: 2.2 {RATIO} (ref 1.2–2.2)
ALP SERPL-CCNC: 45 IU/L (ref 39–117)
ALT SERPL-CCNC: 15 IU/L (ref 0–32)
AST SERPL-CCNC: 17 IU/L (ref 0–40)
BILIRUB SERPL-MCNC: 0.5 MG/DL (ref 0–1.2)
BUN SERPL-MCNC: 22 MG/DL (ref 8–27)
BUN/CREAT SERPL: 23 (ref 12–28)
CALCIUM SERPL-MCNC: 10 MG/DL (ref 8.7–10.3)
CHLORIDE SERPL-SCNC: 105 MMOL/L (ref 96–106)
CHOLEST SERPL-MCNC: 143 MG/DL (ref 100–199)
CO2 SERPL-SCNC: 24 MMOL/L (ref 20–29)
CREAT SERPL-MCNC: 0.97 MG/DL (ref 0.57–1)
ERYTHROCYTE [DISTWIDTH] IN BLOOD BY AUTOMATED COUNT: 12.3 % (ref 11.7–15.4)
FERRITIN SERPL-MCNC: 295 NG/ML (ref 15–150)
GLOBULIN SER CALC-MCNC: 1.9 G/DL (ref 1.5–4.5)
GLUCOSE SERPL-MCNC: 108 MG/DL (ref 65–99)
HCT VFR BLD AUTO: 34.6 % (ref 34–46.6)
HDLC SERPL-MCNC: 45 MG/DL
HGB BLD-MCNC: 11.6 G/DL (ref 11.1–15.9)
INTERPRETATION, 910389: NORMAL
IRON SATN MFR SERPL: 36 % (ref 15–55)
IRON SERPL-MCNC: 103 UG/DL (ref 27–139)
LDLC SERPL CALC-MCNC: 72 MG/DL (ref 0–99)
MCH RBC QN AUTO: 29.2 PG (ref 26.6–33)
MCHC RBC AUTO-ENTMCNC: 33.5 G/DL (ref 31.5–35.7)
MCV RBC AUTO: 87 FL (ref 79–97)
PLATELET # BLD AUTO: 194 X10E3/UL (ref 150–450)
POTASSIUM SERPL-SCNC: 4.6 MMOL/L (ref 3.5–5.2)
PROT SERPL-MCNC: 6.1 G/DL (ref 6–8.5)
RBC # BLD AUTO: 3.97 X10E6/UL (ref 3.77–5.28)
SODIUM SERPL-SCNC: 139 MMOL/L (ref 134–144)
T4 FREE SERPL-MCNC: 1.01 NG/DL (ref 0.82–1.77)
TIBC SERPL-MCNC: 290 UG/DL (ref 250–450)
TRIGL SERPL-MCNC: 130 MG/DL (ref 0–149)
TSH SERPL DL<=0.005 MIU/L-ACNC: 3.74 UIU/ML (ref 0.45–4.5)
UIBC SERPL-MCNC: 187 UG/DL (ref 118–369)
VLDLC SERPL CALC-MCNC: 26 MG/DL (ref 5–40)
WBC # BLD AUTO: 5.5 X10E3/UL (ref 3.4–10.8)

## 2020-08-25 ENCOUNTER — DOCUMENTATION ONLY (OUTPATIENT)
Dept: SLEEP MEDICINE | Age: 65
End: 2020-08-25

## 2020-08-27 ENCOUNTER — DOCUMENTATION ONLY (OUTPATIENT)
Dept: SLEEP MEDICINE | Age: 65
End: 2020-08-27

## 2020-09-10 ENCOUNTER — HOSPITAL ENCOUNTER (OUTPATIENT)
Dept: PREADMISSION TESTING | Age: 65
Discharge: HOME | End: 2020-09-10
Payer: COMMERCIAL

## 2020-09-10 VITALS
SYSTOLIC BLOOD PRESSURE: 88 MMHG | OXYGEN SATURATION: 97 % | WEIGHT: 222.3 LBS | RESPIRATION RATE: 18 BRPM | BODY MASS INDEX: 39.39 KG/M2 | DIASTOLIC BLOOD PRESSURE: 52 MMHG | TEMPERATURE: 97.9 F | HEART RATE: 53 BPM | HEIGHT: 63 IN

## 2020-09-10 LAB
ABO + RH BLD: NORMAL
ALBUMIN SERPL-MCNC: 1.9 G/DL (ref 3.5–5)
ALBUMIN/GLOB SERPL: 0.5 {RATIO} (ref 1.1–2.2)
ALP SERPL-CCNC: 44 U/L (ref 45–117)
ALT SERPL-CCNC: 28 U/L (ref 12–78)
ANION GAP SERPL CALC-SCNC: 10 MMOL/L (ref 5–15)
APPEARANCE UR: ABNORMAL
AST SERPL-CCNC: 13 U/L (ref 15–37)
ATRIAL RATE: 51 BPM
BACTERIA URNS QL MICRO: ABNORMAL /HPF
BASOPHILS # BLD: 0.1 K/UL (ref 0–0.1)
BASOPHILS NFR BLD: 1 % (ref 0–1)
BILIRUB SERPL-MCNC: 0.4 MG/DL (ref 0.2–1)
BILIRUB UR QL: NEGATIVE
BLOOD GROUP ANTIBODIES SERPL: NORMAL
BUN SERPL-MCNC: 27 MG/DL (ref 6–20)
BUN/CREAT SERPL: 28 (ref 12–20)
CALCIUM SERPL-MCNC: 9.7 MG/DL (ref 8.5–10.1)
CALCULATED P AXIS, ECG09: -7 DEGREES
CALCULATED R AXIS, ECG10: 32 DEGREES
CALCULATED T AXIS, ECG11: 51 DEGREES
CHLORIDE SERPL-SCNC: 110 MMOL/L (ref 97–108)
CO2 SERPL-SCNC: 20 MMOL/L (ref 21–32)
COLOR UR: ABNORMAL
CREAT SERPL-MCNC: 0.98 MG/DL (ref 0.55–1.02)
CRP SERPL-MCNC: <0.29 MG/DL (ref 0–0.6)
DIAGNOSIS, 93000: NORMAL
DIFFERENTIAL METHOD BLD: ABNORMAL
EOSINOPHIL # BLD: 0.4 K/UL (ref 0–0.4)
EOSINOPHIL NFR BLD: 6 % (ref 0–7)
EPITH CASTS URNS QL MICRO: ABNORMAL /LPF
ERYTHROCYTE [DISTWIDTH] IN BLOOD BY AUTOMATED COUNT: 13.3 % (ref 11.5–14.5)
ERYTHROCYTE [SEDIMENTATION RATE] IN BLOOD: 109 MM/HR (ref 0–30)
EST. AVERAGE GLUCOSE BLD GHB EST-MCNC: 108 MG/DL
GLOBULIN SER CALC-MCNC: 4.2 G/DL (ref 2–4)
GLUCOSE SERPL-MCNC: 110 MG/DL (ref 65–100)
GLUCOSE UR STRIP.AUTO-MCNC: NEGATIVE MG/DL
HBA1C MFR BLD: 5.4 % (ref 4–5.6)
HCT VFR BLD AUTO: 33.6 % (ref 35–47)
HGB BLD-MCNC: 10.9 G/DL (ref 11.5–16)
HGB UR QL STRIP: NEGATIVE
IMM GRANULOCYTES # BLD AUTO: 0 K/UL (ref 0–0.04)
IMM GRANULOCYTES NFR BLD AUTO: 0 % (ref 0–0.5)
KETONES UR QL STRIP.AUTO: NEGATIVE MG/DL
LEUKOCYTE ESTERASE UR QL STRIP.AUTO: ABNORMAL
LYMPHOCYTES # BLD: 2.1 K/UL (ref 0.8–3.5)
LYMPHOCYTES NFR BLD: 38 % (ref 12–49)
MCH RBC QN AUTO: 29.9 PG (ref 26–34)
MCHC RBC AUTO-ENTMCNC: 32.4 G/DL (ref 30–36.5)
MCV RBC AUTO: 92.3 FL (ref 80–99)
MONOCYTES # BLD: 0.4 K/UL (ref 0–1)
MONOCYTES NFR BLD: 8 % (ref 5–13)
MUCOUS THREADS URNS QL MICRO: ABNORMAL /LPF
NEUTS SEG # BLD: 2.5 K/UL (ref 1.8–8)
NEUTS SEG NFR BLD: 47 % (ref 32–75)
NITRITE UR QL STRIP.AUTO: NEGATIVE
NRBC # BLD: 0 K/UL (ref 0–0.01)
NRBC BLD-RTO: 0 PER 100 WBC
P-R INTERVAL, ECG05: 170 MS
PH UR STRIP: 5 [PH] (ref 5–8)
PLATELET # BLD AUTO: 188 K/UL (ref 150–400)
PMV BLD AUTO: 10.8 FL (ref 8.9–12.9)
POTASSIUM SERPL-SCNC: 4.8 MMOL/L (ref 3.5–5.1)
PROT SERPL-MCNC: 6.1 G/DL (ref 6.4–8.2)
PROT UR STRIP-MCNC: NEGATIVE MG/DL
Q-T INTERVAL, ECG07: 444 MS
QRS DURATION, ECG06: 104 MS
QTC CALCULATION (BEZET), ECG08: 409 MS
RBC # BLD AUTO: 3.64 M/UL (ref 3.8–5.2)
RBC #/AREA URNS HPF: ABNORMAL /HPF (ref 0–5)
SODIUM SERPL-SCNC: 140 MMOL/L (ref 136–145)
SP GR UR REFRACTOMETRY: 1.02 (ref 1–1.03)
SPECIMEN EXP DATE BLD: NORMAL
UA: UC IF INDICATED,UAUC: ABNORMAL
UROBILINOGEN UR QL STRIP.AUTO: 0.2 EU/DL (ref 0.2–1)
VENTRICULAR RATE, ECG03: 51 BPM
WBC # BLD AUTO: 5.5 K/UL (ref 3.6–11)
WBC URNS QL MICRO: ABNORMAL /HPF (ref 0–4)

## 2020-09-10 PROCEDURE — 80053 COMPREHEN METABOLIC PANEL: CPT

## 2020-09-10 PROCEDURE — 86140 C-REACTIVE PROTEIN: CPT

## 2020-09-10 PROCEDURE — 81001 URINALYSIS AUTO W/SCOPE: CPT

## 2020-09-10 PROCEDURE — 85025 COMPLETE CBC W/AUTO DIFF WBC: CPT

## 2020-09-10 PROCEDURE — 86900 BLOOD TYPING SEROLOGIC ABO: CPT

## 2020-09-10 PROCEDURE — 87086 URINE CULTURE/COLONY COUNT: CPT

## 2020-09-10 PROCEDURE — 93005 ELECTROCARDIOGRAM TRACING: CPT

## 2020-09-10 PROCEDURE — 83036 HEMOGLOBIN GLYCOSYLATED A1C: CPT

## 2020-09-10 PROCEDURE — 36415 COLL VENOUS BLD VENIPUNCTURE: CPT

## 2020-09-10 PROCEDURE — 85652 RBC SED RATE AUTOMATED: CPT

## 2020-09-10 PROCEDURE — 87186 SC STD MICRODIL/AGAR DIL: CPT

## 2020-09-10 PROCEDURE — 87077 CULTURE AEROBIC IDENTIFY: CPT

## 2020-09-10 RX ORDER — HYDRALAZINE HYDROCHLORIDE 50 MG/1
50 TABLET, FILM COATED ORAL 3 TIMES DAILY
COMMUNITY
End: 2022-10-25

## 2020-09-10 RX ORDER — AMLODIPINE BESYLATE 5 MG/1
5 TABLET ORAL
COMMUNITY
End: 2022-10-25

## 2020-09-10 RX ORDER — ALBUTEROL SULFATE 90 UG/1
1 AEROSOL, METERED RESPIRATORY (INHALATION)
COMMUNITY

## 2020-09-10 RX ORDER — CLONIDINE HYDROCHLORIDE 0.3 MG/1
0.3 TABLET ORAL 2 TIMES DAILY
COMMUNITY
End: 2022-10-25

## 2020-09-10 RX ORDER — LEVOTHYROXINE AND LIOTHYRONINE 38; 9 UG/1; UG/1
60 TABLET ORAL DAILY
COMMUNITY
End: 2022-10-25

## 2020-09-10 RX ORDER — LANOLIN ALCOHOL/MO/W.PET/CERES
1000 CREAM (GRAM) TOPICAL DAILY
COMMUNITY

## 2020-09-10 NOTE — PERIOP NOTES
Patient states she has an internal bladder stimulator that was deactivated/turned off on 09/096/2020 and that she self catheterizes once daily.   Patient also states that she will bring in her own catheters to use while in hospital.

## 2020-09-10 NOTE — H&P
Preoperative Evaluation                     History and Physical with Surgical Risk Stratification     9/10/2020    CC: Right knee pain  Surgery: RTK     HPI:   Jhoana Villela is a 76 y.o. female referred for pre-operative evaluation by Dr. Sae Jefferson for surgery on 9/24/20. Ms. Thomas Wilde states her knee has been painful for the past 2-3 years. June of this year she was getting out of her car and tore her meniscus. She received a cortisone injection and then planned on having surgery. Her patella has shifted to the right. Denies swelling, buckling and falls. Pain is local to the knee. Walking up a hill makes the pain worse. She is followed by Dr. Sekou Andujar with nephrology. She has an Inter-stem inserted for her bladder but it is turned off. The patient was evaluated in the surgeon's office and it was determined that the most appropriate plan of care is to proceed with surgical intervention. Patient's PCP Prabha Forrester MD    Review of Systems     Constitutional: Negative for chills and fever  HENT: Negative for congestion and sore throat  Eyes: negative for blurred vision and double vision. Floaters  Respiratory: Negative for cough, shortness of breath and wheezing  Mouth: Negative for loose, broken or chipped teeth. Cardiovascular: Negative for chest pain and palpitations  Gastrointestinal: Negative for abdominal pain, constipation, diarrhea and nausea  Genitourinary: Negative for dysuria and hematuria. Urge incontinence  Musculoskeletal: Knee pain  Skin: Negative for rash, open wounds. Negative for bruises easily. Spider veins  Neurological: Negative for dizziness, tremors and headaches  Psychiatric: Negative for depression. The patient is not nervous/anxious.     Inherent Risk of Surgery     Surgical risk:  Intermediate  Low:  EIntermediate:  Joint Replacement, High:    Patient Cardiac Risk Assessment     Revised Cardiac Risk Index (RCRI)    Rate if cardiac death, nonfatal MI, nonfatal cardiac arrest by number of risk factor- 0.4%    ROCHELLE/AHA 2007 Guidelines:   1) Surgery Emergency, Non-cardiac -> to surgery  2) If not, look at clinical predictors    Major Intermediate Minor   Abnormal EKG    Blood Thinner: NA    METS      EQUAL TO 4 Care for self Walk indoors around house Walk 2-3 blocks on level ground (2-3 mph) Light work around house (dust, dishes)     Other Risk Factors:   Screening for ETOH use:  Done and low risk  Smoking status:  Never     Personal or FH of bleeding problems:  No  Personal or FH of blood clots:  No  Personal or FH of anesthesia problems:  Yes    Pulmonary Risk:  Asthma or COPD:  No  Body mass index is 39.38 kg/m². Known DRE:  Possible  Albumin normal, BUN normal    Past Medical, Surgical, Social History     Allergies: Allergies   Allergen Reactions    Codeine Nausea and Vomiting    Erythromycin Rash    Hydrocodone Rash       Medication Documentation Review Audit     Reviewed by Delgado Trimble RN (Registered Nurse) on 09/10/20 at 5952    Medication Sig Documenting Provider Last Dose Status Taking? albuterol (ProAir HFA) 90 mcg/actuation inhaler Take 1 Puff by inhalation every six (6) hours as needed for Wheezing. Provider, Historical  Active Yes   amLODIPine (NORVASC) 5 mg tablet Take 5 mg by mouth daily. Provider, Historical  Active Yes   atorvastatin (LIPITOR) 20 mg tablet TAKE 1 TABLET BY MOUTH EVERY DAY Abrahan Melchor MD  Active Yes   buPROPion XL (Wellbutrin XL) 300 mg XL tablet Take 200 mg by mouth two (2) times a day. 200mg BID Provider, Historical  Active Yes   cholecalciferol, vitamin d3, (VITAMIN D) 1,000 unit tablet Take 2,000 Units by mouth daily. Provider, Historical  Active Yes   cloNIDine HCL (Catapres) 0.3 mg tablet Take 0.3 mg by mouth three (3) times daily. 2 tabs in morning, 1 at lunch, 2 in evening Provider, Historical  Active Yes   COLLAGEN Take 6,000 mg by mouth daily. With biotin combo tablet.  Provider, Historical  Active Yes   cyanocobalamin (Vitamin B-12) 1,000 mcg tablet Take 1,000 mcg by mouth daily. Provider, Historical  Active Yes   diphenoxylate-atropine (LOMOTIL) 2.5-0.025 mg per tablet Take 1 Tab by mouth three (3) times daily as needed for Diarrhea. Max Daily Amount: 3 Tabs. Jules Camp MD  Active Yes   docosahexaenoic acid/epa (FISH OIL PO) Take 1,000 mg by mouth daily. Provider, Historical  Active Yes   fluticasone propion/salmeterol (ADVAIR DISKUS IN) Take 1 Puff by inhalation daily as needed. Provider, Historical  Active Yes   fluticasone propionate (FLONASE) 50 mcg/actuation nasal spray SPRAY 2 SPRAYS INTO EACH NOSTRIL EVERY DAY Angelica Melchor MD  Active Yes   folic acid/multivit-min/lutein (CENTRUM SILVER PO) Take 1 Tab by mouth daily. Provider, Historical  Active Yes   hydrALAZINE (APRESOLINE) 25 mg tablet Take 25 mg by mouth three (3) times daily. Provider, Historical  Active Yes   lisinopriL (PRINIVIL, ZESTRIL) 40 mg tablet Take 1 Tab by mouth daily. Jules Camp MD  Active Yes   meloxicam (MOBIC) 15 mg tablet Take 1 Tab by mouth daily. DR. Kurtis Zuniga MD  Active Yes   montelukast (SINGULAIR) 10 mg tablet TAKE 1 TABLET DAILY Angelica Melchor MD  Active Yes   semaglutide (Rybelsus) 3 mg tablet Take 3 mg by mouth Daily (before breakfast). Provider, Historical  Active Yes   spironolactone (ALDACTONE) 25 mg tablet Take 1 Tab by mouth daily. Jules Camp MD  Active Yes   thyroid, [place], ([place] Thyroid) 60 mg tablet Take 60 mg by mouth daily.  Provider, Historical  Active Yes                Past Medical History:   Diagnosis Date    Anemia 09/10/2020    Arthritis     total knee    Asymptomatic spider vein     Floaters, right 09/08/2020    Hypertension     Dr. Jsoe Ware adjusted medications 9/9/20- Now 88/52    Hypotension 09/09/2020    PONV (postoperative nausea and vomiting)     Retinoschisis     Self-catheterizes urinary bladder 09/10/2020    once daily    Status post implantation of urinary electronic stimulator device     turned off for surgery 9/9/2020    Unintended awareness under general anesthesia 2010    Also has small jaw    Urge incontinence 08/25/2010    abdullahi galarza; botox     Past Surgical History:   Procedure Laterality Date    HX APPENDECTOMY  2010    HX COLONOSCOPY  11/08/2015       10 years again, dr. arreola    HX GYN      c section times 3    HX GYN      ectopic r fallopian tube resected    HX HYSTERECTOMY  1998    HX ORTHOPAEDIC  2006    right wrist fx    HX ORTHOPAEDIC  2010    left knee replacement     HX ORTHOPAEDIC Right 10/19/2017    Elbow     HX UROLOGICAL  2014,   12/3/15    Urethral sling      Social History     Tobacco Use    Smoking status: Never Smoker    Smokeless tobacco: Never Used   Substance Use Topics    Alcohol use: Yes     Alcohol/week: 1.7 standard drinks     Types: 2 Glasses of wine per week     Comment: On Saturday    Drug use: No     Family History   Problem Relation Age of Onset    Cancer Father         prostate ca    Cancer Sister         lung ca 55    Heart Disease Mother 68        a fib    Prostate Cancer Brother     Anesth Problems Neg Hx        Objective     Vitals:    09/10/20 0807   BP: (!) 88/52   Pulse: (!) 53   Resp: 18   Temp: 97.9 °F (36.6 °C)   SpO2: 97%   Weight: 100.8 kg (222 lb 4.8 oz)   Height: 5' 3\" (1.6 m)       Constitutional:  Appears well,  No Acute Distress, Vitals noted  Psychiatric:   Affect normal, Alert and Oriented to person/place/time    Eyes:   Pupils equally round and reactive, EOMI, conjunctiva clear, eyelids normal  ENT:   External ears and nose normal, teeth normal, gums normal, TMs and Orophyarynx normal  Neck:   General inspection and Thyroid normal.  No abnormal cervical or supraclavicular nodes    Lungs:   Clear to auscultation, good respiratory effort  Heart: Ausculation normal.  Regular rhythm. Bradycardia. No cardiac murmurs.   No carotid bruits or palpable thrills  Chest wall normal  Musculoskeletal: Gait antalgic  Extremities:   Without edema, good peripheral pulses  Skin:   Warm to palpation, without rashes, bruising, or suspicious lesions     See CC for labs    Assessment and Plan     Assessment/Plan:   1) OA Right Knee  2) Pre-Operative Evaluation    Labs and EKG reviewed. MRSA negative. Urine culture +    ESR- 109, Hgb 10.9, Albumin low- Sent to surgeon via EMR    Preoperative Clearance  Per RCRI, the patient has a 0.4% risk of cardiac death, nonfatal MI, nonfatal cardiac arrest based on no risk factors. Per ACC/AHA guidelines, patient is lows risk for a(n) intermediate risk surgery and may proceed to planned surgery with the above noted risk.     Ines Bourne NP

## 2020-09-10 NOTE — PERIOP NOTES
[address] [address] [facility]   MAIN OR                                  [phone]   MAIN PRE OP                          [phone]                                                                                AMBULATORY PRE OP          [phone]  PRE-ADMISSION TESTING    [phone]   Surgery Date:  Thursday, September 24, 2020*       Is surgery arrival time given by surgeon? NO  If NO, [facility] staff will call you between 3 and 7pm the day before your surgery with your arrival time. (If your surgery is on a Monday, we will call you the Friday before.)    Call [phone] after 7pm Monday-Friday if you did not receive this call. INSTRUCTIONS BEFORE YOUR SURGERY   When You  Arrive Arrive at the [address] [facility] on the day of your surgery  Have your insurance card, photo ID, and any copayment (if needed)   Food   and   Drink NO food or drink after midnight the night before surgery    This means NO water, gum, mints, coffee, juice, etc.  No alcohol (beer, wine, liquor) 24 hours before and after surgery   Medications to   TAKE   Morning of Surgery MEDICATIONS TO TAKE THE MORNING OF SURGERY WITH A SIP OF WATER:    [place] thyroid   Clonidine   Hydralazine   Amlodipine   Proair if needed   Wellbutrin   Advair if needed   Flonase if needed      Medications  To  STOP      7 days before surgery  Non-Steroidal anti-inflammatory Drugs (NSAID's): for example, Ibuprofen (Advil, Motrin), Naproxen (Aleve)   Aspirin, if taking for pain    Herbal supplements, vitamins, and fish oil   Other: Meloxicam, collagen  (Pain medications not listed above, including Tylenol may be taken)   Blood  Thinners  If you take  Aspirin, Plavix, Coumadin, or any blood-thinning or anti-blood clot medicine, talk to the doctor who prescribed the medications for pre-operative instructions.    Bathing Clothing  Jewelry  Valuables      If you shower the morning of surgery, please do not apply anything to your skin (lotions, powders, deodorant, or makeup, especially mascara)   Follow Chlorhexidine Care Fusion body wash instructions provided to you during PAT appointment. Begin 3 days prior to surgery.  Do not shave or trim anywhere 24 hours before surgery   Wear your hair loose or down; no pony-tails, buns, or metal hair clips   Wear loose, comfortable, clean clothes   Wear glasses instead of contacts   Leave money, valuables, and jewelry, including body piercings, at home   Going Home - or Spending the Night  SAME-DAY SURGERY: You must have a responsible adult drive you home and stay with you 24 hours after surgery   ADMITS: If your doctor is keeping you in the hospital after surgery, leave personal belongings/luggage in your car until you have a hospital room number. Hospital discharge time is 12 noon  Drivers must be here before 12 noon unless you are told differently   Special Instructions It is now mandated that all surgical patients be tested for COVID-19 prior to surgery. Testing has to be exactly 4 days prior to surgery. Your COVID test date is Sunday, September 20, 2020 between 8:00 am and 11:00 am.       COVID testing will be performed curbside at the [facility] [address] entrance. There will be signs leading you to the testing site. You will need to bring a photo ID with you to be swabbed. Patients are advised to self-quarantine at home after testing and prior to your surgery date. You will be notified if your results are positive.     What to watch for:   Coronavirus (COVID-19) affects different people in different ways   It also appears with a wide range of symptoms from mild to severe   Signs usually appear 2-14 days after exposure     If you develop any of the following, notify your doctor immediately:  o Fever  o Chills, with or without a shiver  o Muscle pain  o Headache  o Sore throat  o Dry cough  o New loss of taste or smell  o Tiredness      If you develop any of the following, call 170:  o Shortness of breath  o Difficulty breathing  o Chest pain  o New confusion  o Blueness of fingers and/or lips      Special Instructions:  · Use Chlorhexidine Care Fusion wash and sponges 3 days prior to surgery as instructed. · Incentive spirometer given with instructions to practice at home and bring back to the hospital on the day of surgery. · [facility] will contact you if your Hemoglobin A1C is greater than 7.5. · Ensure/Glucerna  sample, nutritional information, and Ensure/Glucerna coupon given. · Pain pamphlet and Call Don't Fall reminder reviewed with patient. ·  parking is complimentary Monday - Friday 7 am - 5 pm  · Bring PTA Medication list day of surgery with the last doses taken documented    Please bring your inhalers with you the day of surgery. You may have one visitor over the age of 13 with you the day of surgery. Please attend online joint replacement education per the written hand out. It is required by your surgeon. Follow all instructions so your surgery wont be cancelled. Please, be on time. If a situation occurs and you are delayed the day of surgery, call [phone]. If your physical condition changes (like a fever, cold, flu, etc.) call your surgeon. Home medication(s) reviewed and verified with  LIST and  VERBALLY   during PAT appointment. The patient was contacted by IN-PERSON  The patient verbalizes understanding of all instructions and DOES NOT   need reinforcement.

## 2020-09-11 LAB
BACTERIA SPEC CULT: NORMAL
BACTERIA SPEC CULT: NORMAL
SERVICE CMNT-IMP: NORMAL

## 2020-09-12 LAB
BACTERIA SPEC CULT: ABNORMAL
CC UR VC: ABNORMAL
SERVICE CMNT-IMP: ABNORMAL

## 2020-09-14 RX ORDER — CEFUROXIME AXETIL 250 MG/1
250 TABLET ORAL 2 TIMES DAILY
Qty: 10 TAB | Refills: 0 | Status: SHIPPED | OUTPATIENT
Start: 2020-09-14 | End: 2020-09-19

## 2020-09-14 RX ORDER — CIPROFLOXACIN 250 MG/1
250 TABLET, FILM COATED ORAL EVERY 12 HOURS
Qty: 6 TAB | Refills: 0 | Status: SHIPPED | OUTPATIENT
Start: 2020-09-14 | End: 2020-09-17

## 2020-09-14 NOTE — PROGRESS NOTES
Notification of Positive Urine Culture and Treatment Ordered by Preadmission Testing Nurse Practitioner      Patient Name:  Remy Jose  MRN: [id number]  : 1955    Surgeon: Hui Lawson  Date of surgery: 20  Procedure: RTK    Allergies: Allergies   Allergen Reactions    Codeine Nausea and Vomiting    Erythromycin Rash    Hydrocodone Rash       Urine culture result:     Culture result:   Date Value Ref Range Status   09/10/2020 MRSA NOT PRESENT   Final   09/10/2020    Final        Screening of patient nares for MRSA is for surveillance purposes and, if positive, to facilitate isolation considerations in high risk settings. It is not intended for automatic decolonization interventions per se as regimens are not sufficiently effective to warrant routine use.   09/10/2020 ESCHERICHIA COLI (A)   Final       Treatment ordered: Addendum: Patient is on vacation at this time. Prescription sent to [place] pharmacy as requested. She also wanted to request Cipro for her ABX as that works the best for her. I have cautioned the side effects of this medication. Cipro 250mg Q12 hrs x 3 days.      Date patient notified of results / treatment prescribed: 20    The patient was instructed to add medication and date they began treatment to their \"Prior to Admission Medication\" list given to them in [address], NP

## 2020-09-14 NOTE — PERIOP NOTES
Called patient per request of Deni Albarran to inform of + urine culture and tx sent to pharmacy. Patient OOT and [facility] [facility] [phone]. Patient requests  Cipro as \"Keflex doesn't work\". Explained to patient that urine was cultured and exact organism was verified.  Will share this information with NP.

## 2020-09-20 ENCOUNTER — HOSPITAL ENCOUNTER (OUTPATIENT)
Dept: PREADMISSION TESTING | Age: 65
Discharge: HOME | End: 2020-09-20

## 2020-10-01 RX ORDER — FLUTICASONE PROPIONATE 50 MCG
SPRAY, SUSPENSION (ML) NASAL
Qty: 1 BOTTLE | Refills: 1 | Status: ON HOLD | OUTPATIENT
Start: 2020-10-01 | End: 2020-10-29

## 2020-10-16 DIAGNOSIS — J98.01 BRONCHOSPASM: ICD-10-CM

## 2020-10-16 RX ORDER — MONTELUKAST SODIUM 10 MG/1
TABLET ORAL
Qty: 90 TAB | Refills: 1 | Status: SHIPPED | OUTPATIENT
Start: 2020-10-16 | End: 2021-04-13

## 2020-10-22 ENCOUNTER — HOSPITAL ENCOUNTER (OUTPATIENT)
Dept: PREADMISSION TESTING | Age: 65
Discharge: HOME | End: 2020-10-22
Payer: COMMERCIAL

## 2020-10-22 VITALS
DIASTOLIC BLOOD PRESSURE: 56 MMHG | HEIGHT: 63 IN | WEIGHT: 224.87 LBS | RESPIRATION RATE: 20 BRPM | OXYGEN SATURATION: 98 % | BODY MASS INDEX: 39.84 KG/M2 | TEMPERATURE: 98.4 F | HEART RATE: 59 BPM | SYSTOLIC BLOOD PRESSURE: 118 MMHG

## 2020-10-22 LAB
ABO + RH BLD: NORMAL
ALBUMIN SERPL-MCNC: 3.9 G/DL (ref 3.5–5)
ALBUMIN/GLOB SERPL: 1.3 {RATIO} (ref 1.1–2.2)
ALP SERPL-CCNC: 52 U/L (ref 45–117)
ALT SERPL-CCNC: 26 U/L (ref 12–78)
ANION GAP SERPL CALC-SCNC: 3 MMOL/L (ref 5–15)
APPEARANCE UR: ABNORMAL
AST SERPL-CCNC: 16 U/L (ref 15–37)
BACTERIA URNS QL MICRO: ABNORMAL /HPF
BASOPHILS # BLD: 0.1 K/UL (ref 0–0.1)
BASOPHILS NFR BLD: 1 % (ref 0–1)
BILIRUB SERPL-MCNC: 0.6 MG/DL (ref 0.2–1)
BILIRUB UR QL: NEGATIVE
BLOOD GROUP ANTIBODIES SERPL: NORMAL
BUN SERPL-MCNC: 38 MG/DL (ref 6–20)
BUN/CREAT SERPL: 36 (ref 12–20)
CALCIUM SERPL-MCNC: 10.6 MG/DL (ref 8.5–10.1)
CHLORIDE SERPL-SCNC: 110 MMOL/L (ref 97–108)
CO2 SERPL-SCNC: 27 MMOL/L (ref 21–32)
COLOR UR: ABNORMAL
CREAT SERPL-MCNC: 1.05 MG/DL (ref 0.55–1.02)
CRP SERPL-MCNC: <0.29 MG/DL (ref 0–0.6)
DIFFERENTIAL METHOD BLD: ABNORMAL
EOSINOPHIL # BLD: 0.4 K/UL (ref 0–0.4)
EOSINOPHIL NFR BLD: 6 % (ref 0–7)
EPITH CASTS URNS QL MICRO: ABNORMAL /LPF
ERYTHROCYTE [DISTWIDTH] IN BLOOD BY AUTOMATED COUNT: 12.5 % (ref 11.5–14.5)
ERYTHROCYTE [SEDIMENTATION RATE] IN BLOOD: 13 MM/HR (ref 0–30)
EST. AVERAGE GLUCOSE BLD GHB EST-MCNC: 108 MG/DL
GLOBULIN SER CALC-MCNC: 3 G/DL (ref 2–4)
GLUCOSE SERPL-MCNC: 107 MG/DL (ref 65–100)
GLUCOSE UR STRIP.AUTO-MCNC: NEGATIVE MG/DL
HBA1C MFR BLD: 5.4 % (ref 4–5.6)
HCT VFR BLD AUTO: 35.8 % (ref 35–47)
HGB BLD-MCNC: 11.5 G/DL (ref 11.5–16)
HGB UR QL STRIP: NEGATIVE
HYALINE CASTS URNS QL MICRO: ABNORMAL /LPF (ref 0–5)
IMM GRANULOCYTES # BLD AUTO: 0 K/UL (ref 0–0.04)
IMM GRANULOCYTES NFR BLD AUTO: 1 % (ref 0–0.5)
KETONES UR QL STRIP.AUTO: NEGATIVE MG/DL
LEUKOCYTE ESTERASE UR QL STRIP.AUTO: ABNORMAL
LYMPHOCYTES # BLD: 2 K/UL (ref 0.8–3.5)
LYMPHOCYTES NFR BLD: 31 % (ref 12–49)
MCH RBC QN AUTO: 29.7 PG (ref 26–34)
MCHC RBC AUTO-ENTMCNC: 32.1 G/DL (ref 30–36.5)
MCV RBC AUTO: 92.5 FL (ref 80–99)
MONOCYTES # BLD: 0.6 K/UL (ref 0–1)
MONOCYTES NFR BLD: 9 % (ref 5–13)
NEUTS SEG # BLD: 3.4 K/UL (ref 1.8–8)
NEUTS SEG NFR BLD: 52 % (ref 32–75)
NITRITE UR QL STRIP.AUTO: POSITIVE
NRBC # BLD: 0 K/UL (ref 0–0.01)
NRBC BLD-RTO: 0 PER 100 WBC
PH UR STRIP: 5 [PH] (ref 5–8)
PLATELET # BLD AUTO: 206 K/UL (ref 150–400)
PMV BLD AUTO: 10.9 FL (ref 8.9–12.9)
POTASSIUM SERPL-SCNC: 4.7 MMOL/L (ref 3.5–5.1)
PROT SERPL-MCNC: 6.9 G/DL (ref 6.4–8.2)
PROT UR STRIP-MCNC: NEGATIVE MG/DL
RBC # BLD AUTO: 3.87 M/UL (ref 3.8–5.2)
RBC #/AREA URNS HPF: ABNORMAL /HPF (ref 0–5)
SODIUM SERPL-SCNC: 140 MMOL/L (ref 136–145)
SP GR UR REFRACTOMETRY: 1.02 (ref 1–1.03)
SPECIMEN EXP DATE BLD: NORMAL
UA: UC IF INDICATED,UAUC: ABNORMAL
UROBILINOGEN UR QL STRIP.AUTO: 0.2 EU/DL (ref 0.2–1)
WBC # BLD AUTO: 6.4 K/UL (ref 3.6–11)
WBC URNS QL MICRO: ABNORMAL /HPF (ref 0–4)

## 2020-10-22 PROCEDURE — 86140 C-REACTIVE PROTEIN: CPT

## 2020-10-22 PROCEDURE — 80053 COMPREHEN METABOLIC PANEL: CPT

## 2020-10-22 PROCEDURE — 83036 HEMOGLOBIN GLYCOSYLATED A1C: CPT

## 2020-10-22 PROCEDURE — 81001 URINALYSIS AUTO W/SCOPE: CPT

## 2020-10-22 PROCEDURE — 86900 BLOOD TYPING SEROLOGIC ABO: CPT

## 2020-10-22 PROCEDURE — 87186 SC STD MICRODIL/AGAR DIL: CPT

## 2020-10-22 PROCEDURE — 85652 RBC SED RATE AUTOMATED: CPT

## 2020-10-22 PROCEDURE — 85025 COMPLETE CBC W/AUTO DIFF WBC: CPT

## 2020-10-22 PROCEDURE — 87086 URINE CULTURE/COLONY COUNT: CPT

## 2020-10-22 PROCEDURE — 36415 COLL VENOUS BLD VENIPUNCTURE: CPT

## 2020-10-22 RX ORDER — LEVOCETIRIZINE DIHYDROCHLORIDE 2.5 MG/5ML
5 SOLUTION ORAL
COMMUNITY
End: 2022-10-25

## 2020-10-22 RX ORDER — ACETAMINOPHEN 500 MG
500 TABLET ORAL
COMMUNITY
End: 2020-10-30

## 2020-10-22 RX ORDER — BUPROPION HYDROCHLORIDE 200 MG/1
200 TABLET, EXTENDED RELEASE ORAL 2 TIMES DAILY
COMMUNITY
End: 2022-10-25

## 2020-10-22 NOTE — H&P
Preoperative Evaluation                     History and Physical with Surgical Risk Stratification     10/22/2020    CC: Right knee pain  Surgery: RTK     HPI:     Addendum: Ms. Tomeka Tariq states she was cancelled by the surgeon for elevated labs and abnormal EKG. She was sent to Cardiology who gave her a stress test which was negative. Her lab elevation was due to a medication she was taking. Her nephrologist and other specialists have adjusted her medications and recheck of labs showed a normal level. She is feeling good other than the pain in her knee. Denies any new health concerns. Bella Shaw is a 76 y.o. female referred for pre-operative evaluation by Dr. Lissteh Alvarenga for surgery on 9/24/20. Ms. Tomeka Tariq states her knee has been painful for the past 2-3 years. June of this year she was getting out of her car and tore her meniscus. She received a cortisone injection and then planned on having surgery. Her patella has shifted to the right. Denies swelling, buckling and falls. Pain is local to the knee. Walking up a hill makes the pain worse. She is followed by Dr. Kostas Puri with nephrology.      She has an Inter-stem inserted for her bladder but it is turned off. The patient was evaluated in the surgeon's office and it was determined that the most appropriate plan of care is to proceed with surgical intervention. Patient's PCP Dale Moya MD    Review of Systems     Constitutional: Negative for chills and fever  HENT: Negative for congestion and sore throat  Eyes: negative for blurred vision and double vision. Floaters  Respiratory: Negative for cough, shortness of breath and wheezing  Mouth: Negative for loose, broken or chipped teeth. Cardiovascular: Negative for chest pain and palpitations  Gastrointestinal: Negative for abdominal pain, constipation, diarrhea and nausea  Genitourinary: Negative for dysuria and hematuria.  Urge incontinence  Musculoskeletal: Knee pain  Skin: Negative for rash, open wounds. Negative for bruises easily. Spider veins  Neurological: Negative for dizziness, tremors and headaches  Psychiatric: Negative for depression. The patient is not nervous/anxious. Inherent Risk of Surgery     Surgical risk:  Intermediate  Low:  EIntermediate:  Joint Replacement, High:    Patient Cardiac Risk Assessment     Revised Cardiac Risk Index (RCRI)    Rate if cardiac death, nonfatal MI, nonfatal cardiac arrest by number of risk factor- 0.4%    ROCHELLE/AHA 2007 Guidelines:   1) Surgery Emergency, Non-cardiac -> to surgery  2) If not, look at clinical predictors    Major Intermediate Minor   Abnormal EKG    Blood Thinner: NA    METS      EQUAL TO 4 Care for self Walk indoors around house Walk 2-3 blocks on level ground (2-3 mph) Light work around house (dust, dishes)     Other Risk Factors:   Screening for ETOH use:  Done and low risk  Smoking status:  Never     Personal or FH of bleeding problems:  No  Personal or FH of blood clots:  No  Personal or FH of anesthesia problems:  Yes    Pulmonary Risk:  Asthma or COPD:  No  Body mass index is 39.83 kg/m². Known DRE:  Yes  Albumin normal, BUN abnormal    Past Medical, Surgical, Social History     Allergies: Allergies   Allergen Reactions    Codeine Nausea and Vomiting    Erythromycin Rash    Hydrocodone Rash       Medication Documentation Review Audit     Reviewed by Lida Mercado RN (Registered Nurse) on 10/22/20 at 6276    Medication Sig Documenting Provider Last Dose Status Taking?   acetaminophen (Tylenol Extra Strength) 500 mg tablet Take 500 mg by mouth every six (6) hours as needed for Pain. Provider, Historical  Active Yes   albuterol (ProAir HFA) 90 mcg/actuation inhaler Take 1 Puff by inhalation every four (4) hours as needed for Wheezing. Provider, Historical  Active Yes   amLODIPine (NORVASC) 5 mg tablet Take 5 mg by mouth every morning.  Provider, Historical  Active Yes   atorvastatin (LIPITOR) 20 mg tablet TAKE 1 TABLET BY MOUTH EVERY DAY Danyelle Melchor MD  Active Yes   buPROPion SR (Wellbutrin SR) 200 mg SR tablet Take 200 mg by mouth two (2) times a day. Provider, Historical  Active Yes   cholecalciferol, vitamin D3, (Vitamin D3) 50 mcg (2,000 unit) tab Take 2,000 Units by mouth daily. Provider, Historical  Active Yes   cloNIDine HCL (Catapres) 0.3 mg tablet Take 0.3 mg by mouth two (2) times a day. Provider, Historical  Active Yes   cyanocobalamin (Vitamin B-12) 1,000 mcg tablet Take 1,000 mcg by mouth daily. Provider, Historical  Active Yes   diphenoxylate-atropine (LOMOTIL) 2.5-0.025 mg per tablet Take 1 Tab by mouth three (3) times daily as needed for Diarrhea. Max Daily Amount: 3 Tabs. Derrick Jackman MD  Active Yes   fluticasone propion/salmeterol (ADVAIR DISKUS IN) Take 1 Puff by inhalation daily as needed. Provider, Historical  Active Yes   fluticasone propionate (FLONASE) 50 mcg/actuation nasal spray SPRAY 2 SPRAYS INTO EACH NOSTRIL EVERY DAY Danyelle Melchor MD  Active Yes   folic acid/multivit-min/lutein (CENTRUM SILVER PO) Take 1 Tab by mouth daily. Provider, Historical  Active Yes   hydrALAZINE (APRESOLINE) 50 mg tablet Take 50 mg by mouth three (3) times daily. Provider, Historical  Active Yes   lisinopriL (PRINIVIL, ZESTRIL) 40 mg tablet Take 1 Tab by mouth daily. Derrick Jackman MD  Active Yes   meloxicam (MOBIC) 15 mg tablet Take 1 Tab by mouth daily. DR. Aaron Rosas MD  Active Yes   montelukast (SINGULAIR) 10 mg tablet TAKE 1 TABLET DAILY Danyelle Melchor MD  Active Yes   OTHER,NON-FORMULARY, Take 6,000 mg by mouth daily. Collagen & biotin Provider, Historical  Active Yes   spironolactone (ALDACTONE) 25 mg tablet Take 1 Tab by mouth daily. Derrick Jackman MD  Active Yes   thyroid, [place]ork, ([place] Thyroid) 60 mg tablet Take 60 mg by mouth daily.  Provider, Historical  Active Yes              Past Medical History: Diagnosis Date    Anemia 09/10/2020    Asymptomatic spider vein     Chronic UTI     Become resistant to Keflex    Floaters, right 09/08/2020    Hypertension     Dr. Frances Pepe adjusted medications    Hypotension 09/09/2020    Osteoarthritis     PONV (postoperative nausea and vomiting)     Retinoschisis     Self-catheterizes urinary bladder 09/10/2020    once daily    Status post implantation of urinary electronic stimulator device     turned off for surgery 9/9/2020    Unintended awareness under general anesthesia 2010    Also has small jaw    Urge incontinence 08/25/2010    abdullahi michell; botox     Past Surgical History:   Procedure Laterality Date    HX APPENDECTOMY  2010    HX COLONOSCOPY  11/08/2015       10 years again, dr. arreola    HX GYN      c section times 3    HX GYN      ectopic r fallopian tube resected    HX HYSTERECTOMY  1998    HX ORTHOPAEDIC  2006    right wrist fx    HX ORTHOPAEDIC  2010    left knee replacement     HX ORTHOPAEDIC Right 10/19/2017    Elbow     HX UROLOGICAL  2014,   12/3/15    Urethral sling      Social History     Tobacco Use    Smoking status: Never Smoker    Smokeless tobacco: Never Used   Substance Use Topics    Alcohol use:  Yes     Alcohol/week: 1.7 standard drinks     Types: 2 Glasses of wine per week     Comment: On Saturday    Drug use: No     Family History   Problem Relation Age of Onset    Cancer Father         prostate ca    Cancer Sister         lung ca 55    Heart Disease Mother 68        a fib    Prostate Cancer Brother     Anesth Problems Neg Hx        Objective     Vitals:    10/22/20 0759   BP: (!) 118/56   Pulse: (!) 59   Resp: 20   Temp: 98.4 °F (36.9 °C)   SpO2: 98%   Weight: 102 kg (224 lb 13.9 oz)   Height: 5' 3\" (1.6 m)       Constitutional:  Appears well,  No Acute Distress, Vitals noted  Psychiatric:   Affect normal, Alert and Oriented to person/place/time    Eyes:   Pupils equally round and reactive, EOMI, conjunctiva clear, eyelids normal  ENT:   External ears and nose normal, teeth normal, gums normal, TMs and Orophyarynx normal  Neck:   General inspection and Thyroid normal.  No abnormal cervical or supraclavicular nodes    Lungs:   Clear to auscultation, good respiratory effort  Heart: Ausculation normal.  Regular rhythm. Bradycardia. No cardiac murmurs. No carotid bruits or palpable thrills  Chest wall normal  Musculoskeletal: Gait antalgic  Extremities:   Without edema, good peripheral pulses  Skin:   Warm to palpation, without rashes, bruising, or suspicious lesions     Recent Results (from the past 72 hour(s))   TYPE & SCREEN    Collection Time: 10/22/20  8:42 AM   Result Value Ref Range    Crossmatch Expiration 11/01/2020     ABO/Rh(D) A POSITIVE     Antibody screen NEG    C REACTIVE PROTEIN, QT    Collection Time: 10/22/20  8:42 AM   Result Value Ref Range    C-Reactive protein <0.29 0.00 - 0.60 mg/dL   CBC WITH AUTOMATED DIFF    Collection Time: 10/22/20  8:42 AM   Result Value Ref Range    WBC 6.4 3.6 - 11.0 K/uL    RBC 3.87 3.80 - 5.20 M/uL    HGB 11.5 11.5 - 16.0 g/dL    HCT 35.8 35.0 - 47.0 %    MCV 92.5 80.0 - 99.0 FL    MCH 29.7 26.0 - 34.0 PG    MCHC 32.1 30.0 - 36.5 g/dL    RDW 12.5 11.5 - 14.5 %    PLATELET 841 382 - 911 K/uL    MPV 10.9 8.9 - 12.9 FL    NRBC 0.0 0  WBC    ABSOLUTE NRBC 0.00 0.00 - 0.01 K/uL    NEUTROPHILS 52 32 - 75 %    LYMPHOCYTES 31 12 - 49 %    MONOCYTES 9 5 - 13 %    EOSINOPHILS 6 0 - 7 %    BASOPHILS 1 0 - 1 %    IMMATURE GRANULOCYTES 1 (H) 0.0 - 0.5 %    ABS. NEUTROPHILS 3.4 1.8 - 8.0 K/UL    ABS. LYMPHOCYTES 2.0 0.8 - 3.5 K/UL    ABS. MONOCYTES 0.6 0.0 - 1.0 K/UL    ABS. EOSINOPHILS 0.4 0.0 - 0.4 K/UL    ABS. BASOPHILS 0.1 0.0 - 0.1 K/UL    ABS. IMM.  GRANS. 0.0 0.00 - 0.04 K/UL    DF AUTOMATED     METABOLIC PANEL, COMPREHENSIVE    Collection Time: 10/22/20  8:42 AM   Result Value Ref Range    Sodium 140 136 - 145 mmol/L    Potassium 4.7 3.5 - 5.1 mmol/L    Chloride 110 (H) 97 - 108 mmol/L CO2 27 21 - 32 mmol/L    Anion gap 3 (L) 5 - 15 mmol/L    Glucose 107 (H) 65 - 100 mg/dL    BUN 38 (H) 6 - 20 MG/DL    Creatinine 1.05 (H) 0.55 - 1.02 MG/DL    BUN/Creatinine ratio 36 (H) 12 - 20      GFR est AA >60 >60 ml/min/1.73m2    GFR est non-AA 52 (L) >60 ml/min/1.73m2    Calcium 10.6 (H) 8.5 - 10.1 MG/DL    Bilirubin, total 0.6 0.2 - 1.0 MG/DL    ALT (SGPT) 26 12 - 78 U/L    AST (SGOT) 16 15 - 37 U/L    Alk. phosphatase 52 45 - 117 U/L    Protein, total 6.9 6.4 - 8.2 g/dL    Albumin 3.9 3.5 - 5.0 g/dL    Globulin 3.0 2.0 - 4.0 g/dL    A-G Ratio 1.3 1.1 - 2.2     CULTURE, MRSA    Collection Time: 10/22/20  8:42 AM    Specimen: Nares; Nasal   Result Value Ref Range    Special Requests: NO SPECIAL REQUESTS      Culture result: MRSA NOT PRESENT AT 18 HOURS     SED RATE (ESR)    Collection Time: 10/22/20  8:42 AM   Result Value Ref Range    Sed rate, automated 13 0 - 30 mm/hr   URINALYSIS W/ REFLEX CULTURE    Collection Time: 10/22/20  8:42 AM    Specimen: Urine   Result Value Ref Range    Color YELLOW/STRAW      Appearance CLOUDY (A) CLEAR      Specific gravity 1.022 1.003 - 1.030      pH (UA) 5.0 5.0 - 8.0      Protein Negative NEG mg/dL    Glucose Negative NEG mg/dL    Ketone Negative NEG mg/dL    Bilirubin Negative NEG      Blood Negative NEG      Urobilinogen 0.2 0.2 - 1.0 EU/dL    Nitrites Positive (A) NEG      Leukocyte Esterase SMALL (A) NEG      WBC 10-20 0 - 4 /hpf    RBC 0-5 0 - 5 /hpf    Epithelial cells FEW FEW /lpf    Bacteria 4+ (A) NEG /hpf    UA:UC IF INDICATED URINE CULTURE ORDERED (A) CNI      Hyaline cast 0-2 0 - 5 /lpf   HEMOGLOBIN A1C WITH EAG    Collection Time: 10/22/20  8:42 AM   Result Value Ref Range    Hemoglobin A1c 5.4 4.0 - 5.6 %    Est. average glucose 108 mg/dL       Assessment and Plan     Assessment/Plan:   1) Right knee pain  2) Pre-Operative Evaluation    Labs and EKG reviewed.  MRSA & Urine culture pending    Preoperative Clearance  Per RCRI, the patient has a 0.4% risk of cardiac death, nonfatal MI, nonfatal cardiac arrest based on no risk factors. Per ACC/AHA guidelines, patient is low risk for a(n) intermediate risk surgery and may proceed to planned surgery with the above noted risk.     Diego Barrios, NP

## 2020-10-22 NOTE — PERIOP NOTES
[address] [address] [facility]   MAIN OR                                  [phone]   MAIN PRE OP                          [phone]                                                                                AMBULATORY PRE OP          [phone]  PRE-ADMISSION TESTING    [phone]   Surgery Date:  Thursday 10/29/20       Is surgery arrival time given by surgeon? NO  If NO, [facility] staff will call you between 3 and 7pm the day before your surgery with your arrival time. (If your surgery is on a Monday, we will call you the Friday before.)    Call [phone] after 7pm Monday-Friday if you did not receive this call.     INSTRUCTIONS BEFORE YOUR SURGERY   When You  Arrive Arrive at the [facility] [facility] on the day of your surgery  Have your insurance card, photo ID, and any copayment (if needed)   Food   and   Drink NO food or drink after midnight the night before surgery    This means NO water, gum, mints, coffee, juice, etc.  No alcohol (beer, wine, liquor) 24 hours before and after surgery   Medications to   TAKE   Morning of Surgery MEDICATIONS TO TAKE THE MORNING OF SURGERY WITH A SIP OF WATER:    Wellbutrin, amlodipine, albuterol if needed (bring on day of surgery), advair if needed, armour, clonidine, hydralazine   Medications  To  STOP      7 days before surgery  Non-Steroidal anti-inflammatory Drugs (NSAID's): for example, Ibuprofen (Advil, Motrin), Naproxen (Aleve), meloxicam   Aspirin, if taking for pain    Herbal supplements, vitamins, and fish oil   Other:  (Pain medications not listed above, including Tylenol may be taken)   Blood  Thinners     Bathing Clothing  Jewelry  Valuables      If you shower the morning of surgery, please do not apply anything to your skin (lotions, powders, deodorant, or makeup, especially mascara)   Follow Chlorhexidine Care Fusion body wash instructions provided to you during PAT appointment. Begin 3 days prior to surgery.  Do not shave or trim anywhere 24 hours before surgery   Wear your hair loose or down; no pony-tails, buns, or metal hair clips   Wear loose, comfortable, clean clothes   Wear glasses instead of contacts   Leave money, valuables, and jewelry, including body piercings, at home   Going Home - or Spending the Night  SAME-DAY SURGERY: You must have a responsible adult drive you home and stay with you 24 hours after surgery   ADMITS: If your doctor is keeping you in the hospital after surgery, leave personal belongings/luggage in your car until you have a hospital room number. Hospital discharge time is 12 noon  Drivers must be here before 12 noon unless you are told differently   Special Instructions It is now mandated that all surgical patients be tested for COVID-19 prior to surgery. Testing has to be exactly 4 days prior to surgery. Your COVID test date is Sathya 10/25/20 between 8:00 am and 11:00 am.       COVID testing will be performed curbside at the [address] [place] [address] Dr bronson. There will be signs leading you to the testing site. You will need to bring a photo ID with you to be swabbed. Patients are advised to self-quarantine at home after testing and prior to your surgery date. You will be notified if your results are positive. What to watch for:   Coronavirus (COVID-19) affects different people in different ways   It also appears with a wide range of symptoms from mild to severe   Signs usually appear 2-14 days after exposure     If you develop any of the following, notify your doctor immediately:  o Fever  o Chills, with or without a shiver  o Muscle pain  o Headache  o Sore throat  o Dry cough  o New loss of taste or smell  o Tiredness      If you develop any of the following, call 911:  o Shortness of breath  o Difficulty breathing  o Chest pain  o New confusion  o Blueness of fingers and/or lips    16. Special Instructions:  · Use Chlorhexidine Care Fusion wash and sponges 3 days prior to surgery as instructed. · Incentive spirometer given with instructions to practice at home and bring back to the hospital on the day of surgery. · [facility] will contact you if your Hemoglobin A1C is greater than 7.5. · Ensure/Glucerna  sample  and Ensure/Glucerna coupon given. · Pain pamphlet and Call Don't Fall reminder reviewed with patient. · Bring stimulator device remote on day of surgery  · Bring PTA Medication list day of surgery with the last doses taken documented   · Do not bring medication bottles the day of surgery     Follow all instructions so your surgery wont be cancelled. Please, be on time. If a situation occurs and you are delayed the day of surgery, call [phone] or [phone]. If your physical condition changes (like a fever, cold, flu, etc.) call your surgeon. Home medication(s) reviewed and verified via    LIST   VERBAL   during PAT appointment. The patient was contacted by      IN-PERSON  The patient verbalizes understanding of all instructions and     DOES NOT   need reinforcement.

## 2020-10-23 LAB
BACTERIA SPEC CULT: NORMAL
BACTERIA SPEC CULT: NORMAL
SERVICE CMNT-IMP: NORMAL

## 2020-10-24 LAB
BACTERIA SPEC CULT: ABNORMAL
CC UR VC: ABNORMAL
SERVICE CMNT-IMP: ABNORMAL

## 2020-10-25 ENCOUNTER — HOSPITAL ENCOUNTER (OUTPATIENT)
Dept: PREADMISSION TESTING | Age: 65
Discharge: HOME | End: 2020-10-25
Payer: COMMERCIAL

## 2020-10-25 PROCEDURE — 87635 SARS-COV-2 COVID-19 AMP PRB: CPT

## 2020-10-26 RX ORDER — CIPROFLOXACIN 500 MG/1
500 TABLET ORAL 2 TIMES DAILY
COMMUNITY
Start: 2020-10-26 | End: 2020-11-01

## 2020-10-26 RX ORDER — CIPROFLOXACIN 250 MG/1
250 TABLET, FILM COATED ORAL EVERY 12 HOURS
Qty: 6 TAB | Refills: 0 | Status: ON HOLD | OUTPATIENT
Start: 2020-10-26 | End: 2020-10-29 | Stop reason: CLARIF

## 2020-10-26 NOTE — PROGRESS NOTES
Notification of Positive Urine Culture and Treatment Ordered by Preadmission Testing Nurse Practitioner      Patient Name:  Ashley Warren  MRN: [id number]  : 1955    Surgeon: Jorje Cantrell  Date of surgery: 10/29/20  Procedure: RTK    Allergies: Allergies   Allergen Reactions    Codeine Nausea and Vomiting    Erythromycin Rash    Hydrocodone Rash       Urine culture result:     Culture result:   Date Value Ref Range Status   10/22/2020 MRSA NOT PRESENT   Final   10/22/2020    Final        Screening of patient nares for MRSA is for surveillance purposes and, if positive, to facilitate isolation considerations in high risk settings. It is not intended for automatic decolonization interventions per se as regimens are not sufficiently effective to warrant routine use. 10/22/2020 ESCHERICHIA COLI (A)   Final       Treatment ordered: per patient request. She has had multiple UTI's and this works best for her. Addendum: She sent her urine culture to Dr. Jin Johnson who has prescribed Cipro 500mg PO BID x 7 days.      Date patient notified of results / treatment prescribed: 10/26/20    The patient was instructed to add medication and date they began treatment to their \"Prior to Admission Medication\" list given to them in [address], NP

## 2020-10-27 LAB — SARS-COV-2, COV2NT: NOT DETECTED

## 2020-10-28 ENCOUNTER — ANESTHESIA EVENT (OUTPATIENT)
Dept: SURGERY | Age: 65
End: 2020-10-28
Payer: COMMERCIAL

## 2020-10-29 ENCOUNTER — HOSPITAL ENCOUNTER (OUTPATIENT)
Age: 65
Setting detail: OBSERVATION
Discharge: HOME | End: 2020-10-30
Attending: ORTHOPAEDIC SURGERY | Admitting: ORTHOPAEDIC SURGERY
Payer: COMMERCIAL

## 2020-10-29 ENCOUNTER — ANESTHESIA (OUTPATIENT)
Dept: SURGERY | Age: 65
End: 2020-10-29
Payer: COMMERCIAL

## 2020-10-29 ENCOUNTER — APPOINTMENT (OUTPATIENT)
Dept: GENERAL RADIOLOGY | Age: 65
End: 2020-10-29
Attending: PHYSICIAN ASSISTANT
Payer: COMMERCIAL

## 2020-10-29 DIAGNOSIS — M17.11 ARTHRITIS OF RIGHT KNEE: Primary | ICD-10-CM

## 2020-10-29 PROCEDURE — 77030007866 HC KT SPN ANES BBMI -B: Performed by: ANESTHESIOLOGY

## 2020-10-29 PROCEDURE — 74011250637 HC RX REV CODE- 250/637: Performed by: ORTHOPAEDIC SURGERY

## 2020-10-29 PROCEDURE — 77030031139 HC SUT VCRL2 J&J -A: Performed by: ORTHOPAEDIC SURGERY

## 2020-10-29 PROCEDURE — 74011250636 HC RX REV CODE- 250/636: Performed by: ORTHOPAEDIC SURGERY

## 2020-10-29 PROCEDURE — 74011250636 HC RX REV CODE- 250/636: Performed by: ANESTHESIOLOGY

## 2020-10-29 PROCEDURE — 77030038149 HC BLD SAW SAG STRY -D: Performed by: ORTHOPAEDIC SURGERY

## 2020-10-29 PROCEDURE — 76030000021 HC AMB SURG 2 TO 2.5 HR INTENSV-TIER 1: Performed by: ORTHOPAEDIC SURGERY

## 2020-10-29 PROCEDURE — 77030029828 HC FEM TIB CKPNT KT DISP STRY -B: Performed by: ORTHOPAEDIC SURGERY

## 2020-10-29 PROCEDURE — C1776 JOINT DEVICE (IMPLANTABLE): HCPCS | Performed by: ORTHOPAEDIC SURGERY

## 2020-10-29 PROCEDURE — 94640 AIRWAY INHALATION TREATMENT: CPT

## 2020-10-29 PROCEDURE — 77030028907 HC WRP KNEE WO BGS SOLM -B

## 2020-10-29 PROCEDURE — 77030029820: Performed by: ORTHOPAEDIC SURGERY

## 2020-10-29 PROCEDURE — 94664 DEMO&/EVAL PT USE INHALER: CPT

## 2020-10-29 PROCEDURE — 74011250636 HC RX REV CODE- 250/636

## 2020-10-29 PROCEDURE — 74011000250 HC RX REV CODE- 250

## 2020-10-29 PROCEDURE — 77030000032 HC CUF TRNQT ZIMM -B: Performed by: ORTHOPAEDIC SURGERY

## 2020-10-29 PROCEDURE — 74011000250 HC RX REV CODE- 250: Performed by: NURSE ANESTHETIST, CERTIFIED REGISTERED

## 2020-10-29 PROCEDURE — 77030041305 HC PN BN MAKO STRY -B: Performed by: ORTHOPAEDIC SURGERY

## 2020-10-29 PROCEDURE — 77030018673: Performed by: ORTHOPAEDIC SURGERY

## 2020-10-29 PROCEDURE — 76210000035 HC AMBSU PH I REC 1 TO 1.5 HR: Performed by: ORTHOPAEDIC SURGERY

## 2020-10-29 PROCEDURE — 74011000250 HC RX REV CODE- 250: Performed by: ORTHOPAEDIC SURGERY

## 2020-10-29 PROCEDURE — 74011000272 HC RX REV CODE- 272: Performed by: ORTHOPAEDIC SURGERY

## 2020-10-29 PROCEDURE — 77030035236 HC SUT PDS STRATFX BARB J&J -B: Performed by: ORTHOPAEDIC SURGERY

## 2020-10-29 PROCEDURE — 97110 THERAPEUTIC EXERCISES: CPT

## 2020-10-29 PROCEDURE — 77030040361 HC SLV COMPR DVT MDII -B

## 2020-10-29 PROCEDURE — 77030040922 HC BLNKT HYPOTHRM STRY -A

## 2020-10-29 PROCEDURE — 64445 NJX AA&/STRD SCIATIC NRV IMG: CPT | Performed by: ANESTHESIOLOGY

## 2020-10-29 PROCEDURE — 99218 HC RM OBSERVATION: CPT

## 2020-10-29 PROCEDURE — 77030002933 HC SUT MCRYL J&J -A: Performed by: ORTHOPAEDIC SURGERY

## 2020-10-29 PROCEDURE — 77030018723 HC ELCTRD BLD COVD -A: Performed by: ORTHOPAEDIC SURGERY

## 2020-10-29 PROCEDURE — 77030040393 HC DRSG OPTIFOAM GENT MDII -B: Performed by: ORTHOPAEDIC SURGERY

## 2020-10-29 PROCEDURE — 77030018836 HC SOL IRR NACL ICUM -A: Performed by: ORTHOPAEDIC SURGERY

## 2020-10-29 PROCEDURE — 76060000064 HC AMB SURG ANES 2 TO 2.5 HR: Performed by: ORTHOPAEDIC SURGERY

## 2020-10-29 PROCEDURE — 97161 PT EVAL LOW COMPLEX 20 MIN: CPT

## 2020-10-29 PROCEDURE — 97116 GAIT TRAINING THERAPY: CPT

## 2020-10-29 PROCEDURE — 73560 X-RAY EXAM OF KNEE 1 OR 2: CPT

## 2020-10-29 PROCEDURE — 74011250637 HC RX REV CODE- 250/637: Performed by: PHYSICIAN ASSISTANT

## 2020-10-29 PROCEDURE — 77030003601 HC NDL NRV BLK BBMI -A: Performed by: ANESTHESIOLOGY

## 2020-10-29 PROCEDURE — 77030010507 HC ADH SKN DERMBND J&J -B: Performed by: ORTHOPAEDIC SURGERY

## 2020-10-29 PROCEDURE — 74011000250 HC RX REV CODE- 250: Performed by: PHYSICIAN ASSISTANT

## 2020-10-29 PROCEDURE — 74011000258 HC RX REV CODE- 258: Performed by: NURSE ANESTHETIST, CERTIFIED REGISTERED

## 2020-10-29 PROCEDURE — 77030006835 HC BLD SAW SAG STRY -B: Performed by: ORTHOPAEDIC SURGERY

## 2020-10-29 PROCEDURE — 64447 NJX AA&/STRD FEMORAL NRV IMG: CPT | Performed by: ANESTHESIOLOGY

## 2020-10-29 PROCEDURE — 74011250636 HC RX REV CODE- 250/636: Performed by: NURSE ANESTHETIST, CERTIFIED REGISTERED

## 2020-10-29 PROCEDURE — 77030041680 HC PNCL ELECSURG SMK EVAC CNMD -B: Performed by: ORTHOPAEDIC SURGERY

## 2020-10-29 PROCEDURE — 2709999900 HC NON-CHARGEABLE SUPPLY: Performed by: ORTHOPAEDIC SURGERY

## 2020-10-29 PROCEDURE — 77030020263 HC SOL INJ SOD CL0.9% LFCR 1000ML: Performed by: ORTHOPAEDIC SURGERY

## 2020-10-29 PROCEDURE — 74011250636 HC RX REV CODE- 250/636: Performed by: PHYSICIAN ASSISTANT

## 2020-10-29 PROCEDURE — 74011250637 HC RX REV CODE- 250/637: Performed by: ANESTHESIOLOGY

## 2020-10-29 DEVICE — TIBIAL COMPONENT
Type: IMPLANTABLE DEVICE | Site: KNEE | Status: FUNCTIONAL
Brand: TRIATHLON

## 2020-10-29 DEVICE — PATELLA
Type: IMPLANTABLE DEVICE | Site: KNEE | Status: FUNCTIONAL
Brand: TRIATHLON

## 2020-10-29 DEVICE — KNEE K2 TOT HEMI ADV CMTLS -- IMPL CAPPED K2: Type: IMPLANTABLE DEVICE | Status: FUNCTIONAL

## 2020-10-29 DEVICE — CRUCIATE RETAINING FEMORAL
Type: IMPLANTABLE DEVICE | Site: KNEE | Status: FUNCTIONAL
Brand: TRIATHLON

## 2020-10-29 RX ORDER — SODIUM CHLORIDE, SODIUM LACTATE, POTASSIUM CHLORIDE, CALCIUM CHLORIDE 600; 310; 30; 20 MG/100ML; MG/100ML; MG/100ML; MG/100ML
125 INJECTION, SOLUTION INTRAVENOUS CONTINUOUS
Status: DISCONTINUED | OUTPATIENT
Start: 2020-10-29 | End: 2020-10-29 | Stop reason: HOSPADM

## 2020-10-29 RX ORDER — LEVOTHYROXINE AND LIOTHYRONINE 19; 4.5 UG/1; UG/1
60 TABLET ORAL DAILY
Status: DISCONTINUED | OUTPATIENT
Start: 2020-10-29 | End: 2020-10-30 | Stop reason: HOSPADM

## 2020-10-29 RX ORDER — ALBUTEROL SULFATE 0.83 MG/ML
2.5 SOLUTION RESPIRATORY (INHALATION)
Status: DISCONTINUED | OUTPATIENT
Start: 2020-10-29 | End: 2020-10-30 | Stop reason: HOSPADM

## 2020-10-29 RX ORDER — EPHEDRINE SULFATE/0.9% NACL/PF 50 MG/5 ML
SYRINGE (ML) INTRAVENOUS AS NEEDED
Status: DISCONTINUED | OUTPATIENT
Start: 2020-10-29 | End: 2020-10-29 | Stop reason: HOSPADM

## 2020-10-29 RX ORDER — SODIUM CHLORIDE 9 MG/ML
25 INJECTION, SOLUTION INTRAVENOUS AS NEEDED
Status: DISCONTINUED | OUTPATIENT
Start: 2020-10-29 | End: 2020-10-29 | Stop reason: HOSPADM

## 2020-10-29 RX ORDER — MONTELUKAST SODIUM 10 MG/1
10 TABLET ORAL
Status: DISCONTINUED | OUTPATIENT
Start: 2020-10-29 | End: 2020-10-30 | Stop reason: HOSPADM

## 2020-10-29 RX ORDER — TRAMADOL HYDROCHLORIDE 50 MG/1
50 TABLET ORAL
Qty: 28 TAB | Refills: 0 | Status: SHIPPED | OUTPATIENT
Start: 2020-10-29 | End: 2020-11-05

## 2020-10-29 RX ORDER — SODIUM CHLORIDE 0.9 % (FLUSH) 0.9 %
5-40 SYRINGE (ML) INJECTION AS NEEDED
Status: DISCONTINUED | OUTPATIENT
Start: 2020-10-29 | End: 2020-10-29 | Stop reason: HOSPADM

## 2020-10-29 RX ORDER — MELATONIN
2000 DAILY
Status: DISCONTINUED | OUTPATIENT
Start: 2020-10-29 | End: 2020-10-30 | Stop reason: HOSPADM

## 2020-10-29 RX ORDER — DIPHENOXYLATE HYDROCHLORIDE AND ATROPINE SULFATE 2.5; .025 MG/1; MG/1
1 TABLET ORAL
Status: DISCONTINUED | OUTPATIENT
Start: 2020-10-29 | End: 2020-10-30 | Stop reason: HOSPADM

## 2020-10-29 RX ORDER — HYDROMORPHONE HYDROCHLORIDE 1 MG/ML
.25-1 INJECTION, SOLUTION INTRAMUSCULAR; INTRAVENOUS; SUBCUTANEOUS
Status: DISCONTINUED | OUTPATIENT
Start: 2020-10-29 | End: 2020-10-29 | Stop reason: HOSPADM

## 2020-10-29 RX ORDER — BUDESONIDE 0.5 MG/2ML
500 INHALANT ORAL
Status: DISCONTINUED | OUTPATIENT
Start: 2020-10-29 | End: 2020-10-29

## 2020-10-29 RX ORDER — LANOLIN ALCOHOL/MO/W.PET/CERES
1000 CREAM (GRAM) TOPICAL DAILY
Status: DISCONTINUED | OUTPATIENT
Start: 2020-10-30 | End: 2020-10-30 | Stop reason: HOSPADM

## 2020-10-29 RX ORDER — ATORVASTATIN CALCIUM 20 MG/1
20 TABLET, FILM COATED ORAL DAILY
Status: DISCONTINUED | OUTPATIENT
Start: 2020-10-29 | End: 2020-10-29

## 2020-10-29 RX ORDER — PROPOFOL 10 MG/ML
INJECTION, EMULSION INTRAVENOUS
Status: DISCONTINUED | OUTPATIENT
Start: 2020-10-29 | End: 2020-10-29 | Stop reason: HOSPADM

## 2020-10-29 RX ORDER — FLUTICASONE PROPIONATE 50 MCG
SPRAY, SUSPENSION (ML) NASAL
Qty: 1 BOTTLE | Refills: 1 | Status: SHIPPED | OUTPATIENT
Start: 2020-10-29 | End: 2020-11-22

## 2020-10-29 RX ORDER — FLUMAZENIL 0.1 MG/ML
0.2 INJECTION INTRAVENOUS
Status: DISCONTINUED | OUTPATIENT
Start: 2020-10-29 | End: 2020-10-29 | Stop reason: HOSPADM

## 2020-10-29 RX ORDER — HYDRALAZINE HYDROCHLORIDE 25 MG/1
50 TABLET, FILM COATED ORAL 3 TIMES DAILY
Status: DISCONTINUED | OUTPATIENT
Start: 2020-10-29 | End: 2020-10-30 | Stop reason: HOSPADM

## 2020-10-29 RX ORDER — ASPIRIN 81 MG/1
81 TABLET ORAL 2 TIMES DAILY
Qty: 60 TAB | Refills: 0 | Status: SHIPPED | OUTPATIENT
Start: 2020-10-29 | End: 2022-10-25

## 2020-10-29 RX ORDER — HYDROMORPHONE HYDROCHLORIDE 1 MG/ML
0.5 INJECTION, SOLUTION INTRAMUSCULAR; INTRAVENOUS; SUBCUTANEOUS
Status: ACTIVE | OUTPATIENT
Start: 2020-10-29 | End: 2020-10-30

## 2020-10-29 RX ORDER — SODIUM CHLORIDE 0.9 % (FLUSH) 0.9 %
5-40 SYRINGE (ML) INJECTION AS NEEDED
Status: DISCONTINUED | OUTPATIENT
Start: 2020-10-29 | End: 2020-10-30 | Stop reason: HOSPADM

## 2020-10-29 RX ORDER — ONDANSETRON 2 MG/ML
INJECTION INTRAMUSCULAR; INTRAVENOUS AS NEEDED
Status: DISCONTINUED | OUTPATIENT
Start: 2020-10-29 | End: 2020-10-29 | Stop reason: HOSPADM

## 2020-10-29 RX ORDER — IBUPROFEN 800 MG/1
800 TABLET ORAL
Qty: 50 TAB | Refills: 2 | Status: SHIPPED | OUTPATIENT
Start: 2020-10-29 | End: 2022-10-25

## 2020-10-29 RX ORDER — OXYCODONE HYDROCHLORIDE 5 MG/1
5 TABLET ORAL
Status: DISCONTINUED | OUTPATIENT
Start: 2020-10-29 | End: 2020-10-30 | Stop reason: HOSPADM

## 2020-10-29 RX ORDER — FAMOTIDINE 20 MG/1
20 TABLET, FILM COATED ORAL 2 TIMES DAILY
Status: DISCONTINUED | OUTPATIENT
Start: 2020-10-29 | End: 2020-10-30 | Stop reason: HOSPADM

## 2020-10-29 RX ORDER — ACETAMINOPHEN 325 MG/1
975 TABLET ORAL ONCE
Status: COMPLETED | OUTPATIENT
Start: 2020-10-29 | End: 2020-10-29

## 2020-10-29 RX ORDER — OXYCODONE HYDROCHLORIDE 5 MG/1
10 TABLET ORAL
Status: DISCONTINUED | OUTPATIENT
Start: 2020-10-29 | End: 2020-10-30 | Stop reason: HOSPADM

## 2020-10-29 RX ORDER — CHOLECALCIFEROL (VITAMIN D3) 125 MCG
2000 CAPSULE ORAL DAILY
Status: DISCONTINUED | OUTPATIENT
Start: 2020-10-29 | End: 2020-10-29

## 2020-10-29 RX ORDER — BUPROPION HYDROCHLORIDE 100 MG/1
200 TABLET, EXTENDED RELEASE ORAL 2 TIMES DAILY
Status: DISCONTINUED | OUTPATIENT
Start: 2020-10-29 | End: 2020-10-30 | Stop reason: HOSPADM

## 2020-10-29 RX ORDER — NALOXONE HYDROCHLORIDE 0.4 MG/ML
0.4 INJECTION, SOLUTION INTRAMUSCULAR; INTRAVENOUS; SUBCUTANEOUS
Status: DISCONTINUED | OUTPATIENT
Start: 2020-10-29 | End: 2020-10-29 | Stop reason: HOSPADM

## 2020-10-29 RX ORDER — BUDESONIDE 0.5 MG/2ML
500 INHALANT ORAL
Status: DISCONTINUED | OUTPATIENT
Start: 2020-10-29 | End: 2020-10-30 | Stop reason: HOSPADM

## 2020-10-29 RX ORDER — OXYCODONE HYDROCHLORIDE 5 MG/1
10 TABLET ORAL
Status: DISCONTINUED | OUTPATIENT
Start: 2020-10-29 | End: 2020-10-29 | Stop reason: HOSPADM

## 2020-10-29 RX ORDER — BUPIVACAINE HYDROCHLORIDE 5 MG/ML
INJECTION, SOLUTION EPIDURAL; INTRACAUDAL AS NEEDED
Status: DISCONTINUED | OUTPATIENT
Start: 2020-10-29 | End: 2020-10-29 | Stop reason: HOSPADM

## 2020-10-29 RX ORDER — ARFORMOTEROL TARTRATE 15 UG/2ML
15 SOLUTION RESPIRATORY (INHALATION)
Status: DISCONTINUED | OUTPATIENT
Start: 2020-10-29 | End: 2020-10-30 | Stop reason: HOSPADM

## 2020-10-29 RX ORDER — FLUTICASONE PROPIONATE 50 MCG
2 SPRAY, SUSPENSION (ML) NASAL DAILY
Status: DISCONTINUED | OUTPATIENT
Start: 2020-10-29 | End: 2020-10-30 | Stop reason: HOSPADM

## 2020-10-29 RX ORDER — CIPROFLOXACIN 250 MG/1
250 TABLET, FILM COATED ORAL EVERY 12 HOURS
Status: DISCONTINUED | OUTPATIENT
Start: 2020-10-29 | End: 2020-10-29

## 2020-10-29 RX ORDER — FACIAL-BODY WIPES
10 EACH TOPICAL DAILY PRN
Status: DISCONTINUED | OUTPATIENT
Start: 2020-10-31 | End: 2020-10-30 | Stop reason: HOSPADM

## 2020-10-29 RX ORDER — GABAPENTIN 100 MG/1
100 CAPSULE ORAL
Status: DISCONTINUED | OUTPATIENT
Start: 2020-10-30 | End: 2020-10-30 | Stop reason: HOSPADM

## 2020-10-29 RX ORDER — CLONIDINE HYDROCHLORIDE 0.1 MG/1
0.3 TABLET ORAL 2 TIMES DAILY
Status: DISCONTINUED | OUTPATIENT
Start: 2020-10-29 | End: 2020-10-29

## 2020-10-29 RX ORDER — ACETAMINOPHEN 325 MG/1
650 TABLET ORAL EVERY 6 HOURS
Status: DISCONTINUED | OUTPATIENT
Start: 2020-10-29 | End: 2020-10-30 | Stop reason: HOSPADM

## 2020-10-29 RX ORDER — SODIUM CHLORIDE 0.9 % (FLUSH) 0.9 %
5-40 SYRINGE (ML) INJECTION EVERY 8 HOURS
Status: DISCONTINUED | OUTPATIENT
Start: 2020-10-29 | End: 2020-10-29 | Stop reason: HOSPADM

## 2020-10-29 RX ORDER — DEXAMETHASONE SODIUM PHOSPHATE 4 MG/ML
INJECTION, SOLUTION INTRA-ARTICULAR; INTRALESIONAL; INTRAMUSCULAR; INTRAVENOUS; SOFT TISSUE AS NEEDED
Status: DISCONTINUED | OUTPATIENT
Start: 2020-10-29 | End: 2020-10-29 | Stop reason: HOSPADM

## 2020-10-29 RX ORDER — FENTANYL CITRATE 50 UG/ML
INJECTION, SOLUTION INTRAMUSCULAR; INTRAVENOUS AS NEEDED
Status: DISCONTINUED | OUTPATIENT
Start: 2020-10-29 | End: 2020-10-29 | Stop reason: HOSPADM

## 2020-10-29 RX ORDER — GABAPENTIN 300 MG/1
300 CAPSULE ORAL
Status: DISCONTINUED | OUTPATIENT
Start: 2020-10-29 | End: 2020-10-30 | Stop reason: HOSPADM

## 2020-10-29 RX ORDER — SODIUM CHLORIDE 0.9 G/100ML
IRRIGANT IRRIGATION AS NEEDED
Status: DISCONTINUED | OUTPATIENT
Start: 2020-10-29 | End: 2020-10-29 | Stop reason: HOSPADM

## 2020-10-29 RX ORDER — DIPHENHYDRAMINE HYDROCHLORIDE 50 MG/ML
12.5 INJECTION, SOLUTION INTRAMUSCULAR; INTRAVENOUS
Status: DISPENSED | OUTPATIENT
Start: 2020-10-29 | End: 2020-10-30

## 2020-10-29 RX ORDER — ATORVASTATIN CALCIUM 20 MG/1
20 TABLET, FILM COATED ORAL
Status: DISCONTINUED | OUTPATIENT
Start: 2020-10-29 | End: 2020-10-30 | Stop reason: HOSPADM

## 2020-10-29 RX ORDER — ONDANSETRON 8 MG/1
4 TABLET, ORALLY DISINTEGRATING ORAL
Qty: 30 TAB | Refills: 0 | Status: SHIPPED | OUTPATIENT
Start: 2020-10-29 | End: 2022-10-25

## 2020-10-29 RX ORDER — LIDOCAINE HYDROCHLORIDE 10 MG/ML
0.1 INJECTION, SOLUTION EPIDURAL; INFILTRATION; INTRACAUDAL; PERINEURAL AS NEEDED
Status: DISCONTINUED | OUTPATIENT
Start: 2020-10-29 | End: 2020-10-29 | Stop reason: HOSPADM

## 2020-10-29 RX ORDER — OXYCODONE HCL 10 MG/1
10 TABLET, FILM COATED, EXTENDED RELEASE ORAL ONCE
Status: COMPLETED | OUTPATIENT
Start: 2020-10-29 | End: 2020-10-29

## 2020-10-29 RX ORDER — ONDANSETRON 2 MG/ML
4 INJECTION INTRAMUSCULAR; INTRAVENOUS
Status: ACTIVE | OUTPATIENT
Start: 2020-10-29 | End: 2020-10-30

## 2020-10-29 RX ORDER — POVIDONE-IODINE 10 %
SOLUTION, NON-ORAL TOPICAL AS NEEDED
Status: DISCONTINUED | OUTPATIENT
Start: 2020-10-29 | End: 2020-10-29 | Stop reason: HOSPADM

## 2020-10-29 RX ORDER — ARFORMOTEROL TARTRATE 15 UG/2ML
15 SOLUTION RESPIRATORY (INHALATION)
Status: DISCONTINUED | OUTPATIENT
Start: 2020-10-29 | End: 2020-10-29

## 2020-10-29 RX ORDER — SPIRONOLACTONE 25 MG/1
25 TABLET ORAL DAILY
Status: DISCONTINUED | OUTPATIENT
Start: 2020-10-30 | End: 2020-10-30 | Stop reason: HOSPADM

## 2020-10-29 RX ORDER — MIDAZOLAM HYDROCHLORIDE 1 MG/ML
INJECTION, SOLUTION INTRAMUSCULAR; INTRAVENOUS AS NEEDED
Status: DISCONTINUED | OUTPATIENT
Start: 2020-10-29 | End: 2020-10-29 | Stop reason: HOSPADM

## 2020-10-29 RX ORDER — PREGABALIN 75 MG/1
75 CAPSULE ORAL ONCE
Status: COMPLETED | OUTPATIENT
Start: 2020-10-29 | End: 2020-10-29

## 2020-10-29 RX ORDER — POLYETHYLENE GLYCOL 3350 17 G/17G
17 POWDER, FOR SOLUTION ORAL DAILY
Status: DISCONTINUED | OUTPATIENT
Start: 2020-10-29 | End: 2020-10-30 | Stop reason: HOSPADM

## 2020-10-29 RX ORDER — ROPIVACAINE HYDROCHLORIDE 2 MG/ML
INJECTION, SOLUTION EPIDURAL; INFILTRATION; PERINEURAL AS NEEDED
Status: DISCONTINUED | OUTPATIENT
Start: 2020-10-29 | End: 2020-10-29 | Stop reason: HOSPADM

## 2020-10-29 RX ORDER — AMLODIPINE BESYLATE 5 MG/1
5 TABLET ORAL
Status: DISCONTINUED | OUTPATIENT
Start: 2020-10-29 | End: 2020-10-30 | Stop reason: HOSPADM

## 2020-10-29 RX ORDER — SODIUM CHLORIDE, SODIUM LACTATE, POTASSIUM CHLORIDE, CALCIUM CHLORIDE 600; 310; 30; 20 MG/100ML; MG/100ML; MG/100ML; MG/100ML
75 INJECTION, SOLUTION INTRAVENOUS CONTINUOUS
Status: DISCONTINUED | OUTPATIENT
Start: 2020-10-29 | End: 2020-10-29 | Stop reason: HOSPADM

## 2020-10-29 RX ORDER — AMOXICILLIN 250 MG
1 CAPSULE ORAL 2 TIMES DAILY
Status: DISCONTINUED | OUTPATIENT
Start: 2020-10-29 | End: 2020-10-30 | Stop reason: HOSPADM

## 2020-10-29 RX ORDER — CETIRIZINE HCL 10 MG
10 TABLET ORAL
Status: DISCONTINUED | OUTPATIENT
Start: 2020-10-29 | End: 2020-10-30 | Stop reason: HOSPADM

## 2020-10-29 RX ORDER — FENTANYL CITRATE 50 UG/ML
25 INJECTION, SOLUTION INTRAMUSCULAR; INTRAVENOUS
Status: DISCONTINUED | OUTPATIENT
Start: 2020-10-29 | End: 2020-10-29 | Stop reason: HOSPADM

## 2020-10-29 RX ORDER — NALOXONE HYDROCHLORIDE 0.4 MG/ML
0.4 INJECTION, SOLUTION INTRAMUSCULAR; INTRAVENOUS; SUBCUTANEOUS AS NEEDED
Status: DISCONTINUED | OUTPATIENT
Start: 2020-10-29 | End: 2020-10-30 | Stop reason: HOSPADM

## 2020-10-29 RX ORDER — SODIUM CHLORIDE 9 MG/ML
125 INJECTION, SOLUTION INTRAVENOUS CONTINUOUS
Status: DISPENSED | OUTPATIENT
Start: 2020-10-29 | End: 2020-10-30

## 2020-10-29 RX ORDER — SODIUM CHLORIDE 0.9 % (FLUSH) 0.9 %
5-40 SYRINGE (ML) INJECTION EVERY 8 HOURS
Status: DISCONTINUED | OUTPATIENT
Start: 2020-10-29 | End: 2020-10-30 | Stop reason: HOSPADM

## 2020-10-29 RX ORDER — ASPIRIN 81 MG/1
81 TABLET ORAL 2 TIMES DAILY
Status: DISCONTINUED | OUTPATIENT
Start: 2020-10-29 | End: 2020-10-30 | Stop reason: HOSPADM

## 2020-10-29 RX ORDER — CIPROFLOXACIN 500 MG/1
500 TABLET ORAL 2 TIMES DAILY
Status: DISCONTINUED | OUTPATIENT
Start: 2020-10-29 | End: 2020-10-30 | Stop reason: HOSPADM

## 2020-10-29 RX ORDER — GABAPENTIN 100 MG/1
CAPSULE ORAL
Qty: 120 CAP | Refills: 1 | Status: SHIPPED | OUTPATIENT
Start: 2020-10-29 | End: 2022-10-25

## 2020-10-29 RX ORDER — OXYCODONE HYDROCHLORIDE 5 MG/1
5 TABLET ORAL
Qty: 42 TAB | Refills: 0 | Status: SHIPPED | OUTPATIENT
Start: 2020-10-29 | End: 2020-11-05

## 2020-10-29 RX ORDER — PROPOFOL 10 MG/ML
INJECTION, EMULSION INTRAVENOUS AS NEEDED
Status: DISCONTINUED | OUTPATIENT
Start: 2020-10-29 | End: 2020-10-29 | Stop reason: HOSPADM

## 2020-10-29 RX ORDER — CLONIDINE HYDROCHLORIDE 0.1 MG/1
0.3 TABLET ORAL 2 TIMES DAILY
Status: DISCONTINUED | OUTPATIENT
Start: 2020-10-29 | End: 2020-10-30 | Stop reason: HOSPADM

## 2020-10-29 RX ADMIN — Medication 2 TABLET: at 13:29

## 2020-10-29 RX ADMIN — DOCUSATE SODIUM 50MG AND SENNOSIDES 8.6MG 1 TABLET: 8.6; 5 TABLET, FILM COATED ORAL at 18:57

## 2020-10-29 RX ADMIN — FENTANYL CITRATE 50 MCG: 0.05 INJECTION, SOLUTION INTRAMUSCULAR; INTRAVENOUS at 07:18

## 2020-10-29 RX ADMIN — ROPIVACAINE HYDROCHLORIDE 20 ML: 2 INJECTION, SOLUTION EPIDURAL; INFILTRATION; PERINEURAL at 07:23

## 2020-10-29 RX ADMIN — Medication 10 ML: at 23:15

## 2020-10-29 RX ADMIN — Medication 10 MG: at 07:57

## 2020-10-29 RX ADMIN — BUPIVACAINE HYDROCHLORIDE 2.2 ML: 5 INJECTION, SOLUTION EPIDURAL; INTRACAUDAL; PERINEURAL at 07:28

## 2020-10-29 RX ADMIN — SODIUM CHLORIDE, POTASSIUM CHLORIDE, SODIUM LACTATE AND CALCIUM CHLORIDE: 600; 310; 30; 20 INJECTION, SOLUTION INTRAVENOUS at 09:30

## 2020-10-29 RX ADMIN — FAMOTIDINE 20 MG: 20 TABLET, FILM COATED ORAL at 18:56

## 2020-10-29 RX ADMIN — CETIRIZINE HYDROCHLORIDE 10 MG: 10 TABLET, FILM COATED ORAL at 23:10

## 2020-10-29 RX ADMIN — MIDAZOLAM HYDROCHLORIDE 2 MG: 2 INJECTION, SOLUTION INTRAMUSCULAR; INTRAVENOUS at 07:18

## 2020-10-29 RX ADMIN — GABAPENTIN 300 MG: 300 CAPSULE ORAL at 23:10

## 2020-10-29 RX ADMIN — CLONIDINE HYDROCHLORIDE 0.3 MG: 0.1 TABLET ORAL at 18:56

## 2020-10-29 RX ADMIN — PREGABALIN 75 MG: 75 CAPSULE ORAL at 06:05

## 2020-10-29 RX ADMIN — CIPROFLOXACIN 500 MG: 500 TABLET, FILM COATED ORAL at 18:58

## 2020-10-29 RX ADMIN — OXYCODONE HYDROCHLORIDE 10 MG: 10 TABLET, FILM COATED, EXTENDED RELEASE ORAL at 06:05

## 2020-10-29 RX ADMIN — PROPOFOL 20 MG: 10 INJECTION, EMULSION INTRAVENOUS at 07:38

## 2020-10-29 RX ADMIN — MONTELUKAST SODIUM 10 MG: 10 TABLET, FILM COATED ORAL at 23:10

## 2020-10-29 RX ADMIN — LEVOTHYROXINE, LIOTHYRONINE 60 MG: 19; 4.5 TABLET ORAL at 19:06

## 2020-10-29 RX ADMIN — FENTANYL CITRATE 50 MCG: 0.05 INJECTION, SOLUTION INTRAMUSCULAR; INTRAVENOUS at 07:25

## 2020-10-29 RX ADMIN — ROPIVACAINE HYDROCHLORIDE 20 ML: 2 INJECTION, SOLUTION EPIDURAL; INFILTRATION; PERINEURAL at 07:20

## 2020-10-29 RX ADMIN — Medication 10 MG: at 07:47

## 2020-10-29 RX ADMIN — HYDRALAZINE HYDROCHLORIDE 50 MG: 25 TABLET, FILM COATED ORAL at 16:52

## 2020-10-29 RX ADMIN — Medication 10 MG: at 08:22

## 2020-10-29 RX ADMIN — CEFAZOLIN SODIUM 2 G: 1 POWDER, FOR SOLUTION INTRAMUSCULAR; INTRAVENOUS at 07:47

## 2020-10-29 RX ADMIN — POLYETHYLENE GLYCOL 3350 17 G: 17 POWDER, FOR SOLUTION ORAL at 13:29

## 2020-10-29 RX ADMIN — BUDESONIDE 500 MCG: 0.5 INHALANT RESPIRATORY (INHALATION) at 21:16

## 2020-10-29 RX ADMIN — Medication 10 MG: at 08:02

## 2020-10-29 RX ADMIN — OXYCODONE 10 MG: 5 TABLET ORAL at 20:16

## 2020-10-29 RX ADMIN — PROPOFOL 50 MCG/KG/MIN: 10 INJECTION, EMULSION INTRAVENOUS at 07:38

## 2020-10-29 RX ADMIN — SODIUM CHLORIDE, SODIUM LACTATE, POTASSIUM CHLORIDE, AND CALCIUM CHLORIDE 1000 ML: 600; 310; 30; 20 INJECTION, SOLUTION INTRAVENOUS at 06:08

## 2020-10-29 RX ADMIN — ARFORMOTEROL TARTRATE 15 MCG: 15 SOLUTION RESPIRATORY (INHALATION) at 21:16

## 2020-10-29 RX ADMIN — SODIUM CHLORIDE 125 ML/HR: 900 INJECTION, SOLUTION INTRAVENOUS at 11:00

## 2020-10-29 RX ADMIN — ACETAMINOPHEN 650 MG: 325 TABLET ORAL at 18:57

## 2020-10-29 RX ADMIN — OXYCODONE 10 MG: 5 TABLET ORAL at 23:42

## 2020-10-29 RX ADMIN — Medication 81 MG: at 18:57

## 2020-10-29 RX ADMIN — ACETAMINOPHEN 650 MG: 325 TABLET ORAL at 13:34

## 2020-10-29 RX ADMIN — ATORVASTATIN CALCIUM 20 MG: 20 TABLET, FILM COATED ORAL at 23:10

## 2020-10-29 RX ADMIN — CEFAZOLIN SODIUM 2 G: 1 INJECTION, POWDER, FOR SOLUTION INTRAMUSCULAR; INTRAVENOUS at 13:28

## 2020-10-29 RX ADMIN — TRANEXAMIC ACID 1 G: 100 INJECTION, SOLUTION INTRAVENOUS at 07:36

## 2020-10-29 RX ADMIN — ACETAMINOPHEN 975 MG: 325 TABLET ORAL at 06:05

## 2020-10-29 RX ADMIN — TRANEXAMIC ACID 1 G: 100 INJECTION, SOLUTION INTRAVENOUS at 09:16

## 2020-10-29 RX ADMIN — SODIUM CHLORIDE, POTASSIUM CHLORIDE, SODIUM LACTATE AND CALCIUM CHLORIDE: 600; 310; 30; 20 INJECTION, SOLUTION INTRAVENOUS at 06:21

## 2020-10-29 RX ADMIN — Medication 10 ML: at 19:06

## 2020-10-29 RX ADMIN — BUPROPION HYDROCHLORIDE 200 MG: 100 TABLET, EXTENDED RELEASE ORAL at 18:55

## 2020-10-29 RX ADMIN — OXYCODONE 5 MG: 5 TABLET ORAL at 16:52

## 2020-10-29 RX ADMIN — ACETAMINOPHEN 650 MG: 325 TABLET ORAL at 23:37

## 2020-10-29 RX ADMIN — SODIUM CHLORIDE 125 ML/HR: 900 INJECTION, SOLUTION INTRAVENOUS at 18:46

## 2020-10-29 RX ADMIN — SODIUM CHLORIDE, POTASSIUM CHLORIDE, SODIUM LACTATE AND CALCIUM CHLORIDE: 600; 310; 30; 20 INJECTION, SOLUTION INTRAVENOUS at 07:35

## 2020-10-29 RX ADMIN — Medication 10 MG: at 07:41

## 2020-10-29 RX ADMIN — DEXAMETHASONE SODIUM PHOSPHATE 4 MG: 4 INJECTION, SOLUTION INTRAMUSCULAR; INTRAVENOUS at 08:03

## 2020-10-29 RX ADMIN — ONDANSETRON HYDROCHLORIDE 4 MG: 2 SOLUTION INTRAMUSCULAR; INTRAVENOUS at 08:03

## 2020-10-29 RX ADMIN — FLUTICASONE PROPIONATE 2 SPRAY: 50 SPRAY, METERED NASAL at 22:00

## 2020-10-29 RX ADMIN — CEFAZOLIN SODIUM 2 G: 1 INJECTION, POWDER, FOR SOLUTION INTRAMUSCULAR; INTRAVENOUS at 23:10

## 2020-10-29 NOTE — DISCHARGE INSTRUCTIONS
TOTAL KNEE DISCHARGE INSTRUCTIONS      Patient: Vadim Cronin MRN: [id number]  SSN: [id number]              Please take the time to review the following instructions before you leave the hospital and use them as guidelines during your recovery from surgery. If you have any questions you may contact my office at [phone]  After business hours or during the weekend you can contact me through [address] or text / call at [phone] (cell phone) for emergency's. Please use the office number during regular business hours. SPECIAL INSTRUCTIONS :   1. Full extension at the knee is the most important aspect of your range of motion. Avoid placing a pillow or bump behind the knee. Rather, place the heel up on a bump or pillow and allow gravity to help straighten the knee. 2. You may weight bear as tolerated on the knee and during the day you should bend the knee as much as possible. 3. Drainage from the incision more than 4 days from surgery is concerning. Contact my office if there is any question [phone].   4. You may contact me directly through [employer] if there are specific questions or text / call using my cell number [phone]. DRESSING :     Post-op Dressings : This should be removed by physical therapy or you may remove this yourself 7 days after the date of your surgery. If there is no drainage, then a simple dressing may be used or no dressing at all. Other dressing options can be purchased over the counter at a local pharmacy or medical supply vendor. A porous adhesive dressing such as pictured above can be purchased online ([address] [facility] [address]) or at your local [facility] or [employer]. You only need to keep the incision covered for 7 days after showers. A dressing may be used for longer if there are issues with clothing clinging to the incision. Showering/ Bathing: You may shower with the Post-op dressing in place.  This is left in place for 7 days following discharge from the hospital. If your incision is dry without drainage you may shower following your discharge home. After 7 days your dressing should be removed for showering. It is fine to have water run over the incision. Do not vigorously scrub your incision. Apply a clean, dry dressing after you have dried your incision. Do not take a bath or get into a swimming pool / Pearescope until you follow up with Dr. Lisseth Alvarenga. Do not soak your incision under water. If there is continued drainage or you are concerned contact Dr Amarilys Osuna office prior to showering [phone] ext [phone] . Diet:  You may advance to your regular diet as tolerated. Increase your clear liquid intake for the next 2-3 days. Medication:      1. You will be given prescriptions for pain medication when you are discharged from the hospital. The side effects of these medications can be substantial and the narcotic medications are not mandatory. You may substitute these medications with Tylenol or Alleve / Motrin. 2. Please use the medications as prescribed. Pain medications may cause constipation- Colace twice daily and Miralax one scoop daily while taking the narcotic medication should help prevent constipation. Please discuss with your local pharmacist regarding increasing this dosage if constipation persists. Other possible side effects of pain medication are dizziness, headache, nausea, vomiting, and urinary retention. Discontinue the pain medication if you develop itching, rash, shortness of breath, or difficulties swallowing. If these symptoms become severe or are not relieved by discontinuing the medication, you should seek immediate medical attention. 3. Refills of pain medication are authorized during office hours only (8 AM- 5 PM  Monday thru Friday). Many of these medication will require you or a family member to pick-up a physical prescription at the office.    4. Medications other than antiinflammatories will not be called into the pharmacy after business hours. 5. You may resume the medication(s) you were taking prior to your surgery. Narcotics may change the effects of some antidepressant medication(s). If you have any questions about possible interactions between your regular medications and the pain medication, you should ask the pharmacist or contact the prescribing physician. 6. If you have constipation which is not improved by oral stool softeners then a Ducolax suppository should be purchased over the counter. 7. Continue the blood thinner (Aspirin or Lovenox) for a total of 30 days following surgery. Follow up appointment:    Please call our office at [phone] for your follow up appointment. This should be scheduled 14 days following the date of surgery. You should also have a scheduled appointment for physical therapy following surgery. Physical Therapy / Nursing:    Physical Therapy following surgery will be arranged as an outpatient or at home. They have specific instructions for rehab and wound care. It is fine to have physical therapy remove your dressing at 7 days following surgery. Returning to work:    Normal return to work is 6-12 weeks following surgery. Depending on your progression following surgery and specific job duties you may take longer for a full return to work. DRIVING    You should not return to driving until you are off all opioid pain medications and able to safely and quickly apply the brakes. This is normally 2-6 weeks for left sided surgery and 2-8 weeks for right sided surgery. Important Signs and Symptoms:    If any of the following signs or symptoms occur, you should contact Dr. Abdulaziz Navarrete office.   Please be advised if a problem arises which you feel requires immediate medical attention or you are unable to contact Dr. Abdulaziz Navarrete office you should seek immediate medical attention at the ER or other health care facility you have access to.    1. A sudden increase in swelling and/or redness or warmth at the area your surgery was performed which isnt relieved by rest, ice, and elevation. 2. Oral temperature greater than 101 degrees for 12 hours or more which isnt relieved by an increase in fluid intake and taking 2 Tylenol every 4-6 hours. 3. Excessive drainage from your incisions, or drainage which hasnt stopped by 72 hours after your surgery. 4. Fever, chills, shortness of breath, chest pain, nausea, vomiting or other signs and symptoms which are of concern to you. YOUR TOTAL JOINT REPLACEMENT  FREQUENTLY ASKED QUESTIONS   What should I take for pain?  o You will be discharged with four medications for pain (Oxycodone, Tramadol, Ibuprofen and Tylenol). These may vary slightly depending on what you were taking in the hospital.   - 1st Line - Tylenol Arthritis Strength - 325-650 mg every 4 hours (scheduled for the 1st 24 hours)  - 2nd Line -  Ibuprofen 400 (2 tabs) - 800 (4 tabs) mg every 8 hours  (scheduled for the 1st 24 hours)  - 3rd Line - Oxycodone 5 mg (1-2 tablets every 4-6 hrs)  - 4th Line - Tramadol 50 mg (1-2 tablets every 4-6 hours) - take these between Oxycodone doses if your pain is not alleviated.  When should I call for advice regarding my pain?  o If your pain is still uncontrolled after being on the regimen above for at least 12 hours, please call the office [phone] or text / call my cell after hours [phone].  Can I get refills?  o Opioid refills are provided for the first 2-6 weeks following surgery. o Use Tylenol 500 mg along with Aleve 220mg twice daily or Motrin 200-800mg every 4-6 hours during the daytime hours after two weeks. - After two weeks, I suggest the opioid pain medications be used only 1 hour prior to your physical therapy appointment and 1 hour before sleeping at night. Use Tylenol and Ibuprofen at other times during the day.    - Keep in mind that you will need to discontinue opioids before you resume driving.  Is swelling normal?  o Almost everyone has some degree of swelling following surgery. o Following hip and knee replacement surgery, swelling can be normal below the incision for the first few weeks. - This swelling peaks around 5-7 days after surgery. - It is not unusual to have some bruising about the back of the thigh, calf, ankle, and foot.  What should I do for the swelling?  o Keep the limb elevated above the level of your heart - 'Toes above Nose'. o Apply compression socks (knee high for total knees and up to the mid-thigh for total hips). o Use the ice packs that you are discharged home with several times a day for the first several weeks.  How long should I remain on blood thinners following surgery? o 30 days   Which blood thinners will I be on? Can I take them with Tylenol?  o  Aspirin 81 mg twice daily - these should be taken with meals and can be used with Tylenol. In certain instances, you may be sent home on Lovenox for 30 days. o For short periods of time (30 days), aspirin and anti-inflammatories (i.e. Aleve, Motrin / Advil / ibuprofen, diclofenac, etc. can be taken together).  When can I drive?  o Once you have stopped using regular narcotic pain medications (Oxycodone, Percocet, Lortab, Norco etc.) and can safely apply the brakes without hesitation, (emergency braking).  When can I shower?  o You may shower immediately if your Optifoam bandage is dry and without discharge. The Optifoam dressing should be removed 7 days following surgery, after which you may continue to shower.  o No submersion of the incision, bathing or swimming for 14 days following surgery or until cleared by Dr Glenis Francois.    Can I remove this dressing?  o Yes, this is removed just like removing a band aid.  o If you are concerned, this can be removed by your therapist.   [place] What do I do with the dressing when I shower?  o The Optifoam dressing is waterproof and you may shower with it.   o The incision is sealed with Dermabond, a biologic skin glue, which also serves as a watertight seal. If your incision is draining, it is no longer considered to be watertight - you should contact our office prior to showering if you experience any drainage.  Which dressing should I purchase after I remove my Optifoam?  o An occlusive dressing which covers your entire incision. This does not have to be waterproof, but will need to be removed when you shower and then replaced. (Example Only)   How active should I be following surgery? o Progress activities in moderation and at your own pace.   o Walking room to room in your house is encouraged. o Walk each day and set progressive goals with small increments (1st week - ?block of walking, 2nd week - 1 block, 3rd week - 2 blocks, etc.)   Will I need help at home?  o You will likely need a caretaker who should be available for the first week following surgery. It is fine for family members to work during the day, as long as they are available by phone. o Planning ahead makes coming home from the hospital a much easier transition.  How long will my surgery take?  o On average, total joint replacement takes approximately 1-2 hour.   o The entire process, including pre-op and post-op care can last as long as 4- 5 hours before you are transferred to your room. o stroke, pulmonary embolism (a clot going from the legs to the lungs), and even death with surgery.  Will I be given antibiotics? Will I need antibiotics at discharge?  o Antibiotics will be given to you both before and after your procedure. To further minimize the risk of infection, we have streamlined the surgical procedure to take less time in the operating room.    o You do not require antibiotics following surgery.       Please do not hesitate to contact me through [employer] or by text / call me at [phone] (cell phone) for questions following surgery - Nickie Valera MD Cuba Glaesr MD  Cell [phone]  Alexandre Hancock PA-C  Cell [phone]  Medical Assistant: Adrian Cali [phone]

## 2020-10-29 NOTE — ANESTHESIA PREPROCEDURE EVALUATION
Anesthetic History     PONV          Review of Systems / Medical History  Patient summary reviewed, nursing notes reviewed and pertinent labs reviewed    Pulmonary  Within defined limits                 Neuro/Psych   Within defined limits           Cardiovascular    Hypertension          Hyperlipidemia    Exercise tolerance: >4 METS  Comments: carvedilol (COREG CR) 10 mg CR capsule taken this morning   GI/Hepatic/Renal         Renal disease: stones       Endo/Other        Morbid obesity and arthritis     Other Findings              Physical Exam    Airway  Mallampati: III  TM Distance: 4 - 6 cm  Neck ROM: normal range of motion, short neck   Mouth opening: Normal     Cardiovascular  Regular rate and rhythm,  S1 and S2 normal,  no murmur, click, rub, or gallop  Rhythm: regular  Rate: normal         Dental    Dentition: Bridges and Caps/crowns     Pulmonary  Breath sounds clear to auscultation               Abdominal  GI exam deferred       Other Findings            Anesthetic Plan    ASA: 2  Anesthesia type: regional and spinal - femoral single shot and sciatic single shot      Post-op pain plan if not by surgeon: peripheral nerve block single      Anesthetic plan and risks discussed with: Patient

## 2020-10-29 NOTE — ANESTHESIA POSTPROCEDURE EVALUATION
Procedure(s):  TOTAL KNEE REPLACEMENT RIGHT - JAYLEEN. regional, spinal    Anesthesia Post Evaluation      Multimodal analgesia: multimodal analgesia not used between 6 hours prior to anesthesia start to PACU discharge  Patient location during evaluation: PACU  Patient participation: complete - patient participated  Level of consciousness: awake  Pain management: adequate  Airway patency: patent  Anesthetic complications: no  Cardiovascular status: acceptable, blood pressure returned to baseline and hemodynamically stable  Respiratory status: acceptable  Hydration status: acceptable  Post anesthesia nausea and vomiting:  controlled      INITIAL Post-op Vital signs:   Vitals Value Taken Time   /61 10/29/2020 10:25 AM   Temp 36.4 °C (97.6 °F) 10/29/2020 10:00 AM   Pulse 67 10/29/2020 10:29 AM   Resp 11 10/29/2020 10:29 AM   SpO2 94 % 10/29/2020 10:29 AM   Vitals shown include unvalidated device data.

## 2020-10-29 NOTE — PROGRESS NOTES
TRANSFER - IN REPORT:    Verbal report received from [facility] (name) on Susana Salinas  being received from [facility] PAC[facility] (unit) for routine post - op      Report consisted of patients Situation, Background, Assessment and   Recommendations(SBAR). Information from the following report(s) SBAR, Kardex, OR Summary and Intake/Output was reviewed with the receiving nurse. Opportunity for questions and clarification was provided. Assessment completed upon patients arrival to unit and care assumed.           i

## 2020-10-29 NOTE — PERIOP NOTES
TRANSFER - OUT REPORT:    Verbal report given to Renita(jd) on Donna Garcia  being transferred to [facility](unit) for routine post - op       Report consisted of patients Situation, Background, Assessment and   Recommendations(SBAR). Information from the following report(s) SBAR was reviewed with the receiving nurse. Lines:   Peripheral IV 10/29/20 Left Arm (Active)   Site Assessment Clean, dry, & intact 10/29/20 1000   Phlebitis Assessment 0 10/29/20 1000   Infiltration Assessment 0 10/29/20 1000   Dressing Status Clean, dry, & intact 10/29/20 1000   Dressing Type Transparent 10/29/20 1000   Hub Color/Line Status Pink 10/29/20 1000   Alcohol Cap Used Yes 10/29/20 0602        Opportunity for questions and clarification was provided.       Patient transported with:   Registered Nurse

## 2020-10-29 NOTE — ANESTHESIA PROCEDURE NOTES
Peripheral Block    Start time: 10/29/2020 7:18 AM  End time: 10/29/2020 7:23 AM  Performed by: Lizzeth Weinstein MD  Authorized by: Lizzeth Weinstein MD       Pre-procedure: Indications: at surgeon's request and post-op pain management    Preanesthetic Checklist: patient identified, risks and benefits discussed, site marked, timeout performed, anesthesia consent given and patient being monitored    Timeout Time: 07:18          Block Type:   Block Type:  Femoral single shot and sciatic single shot  Laterality:  Right  Monitoring:  Continuous pulse ox, frequent vital sign checks, heart rate, responsive to questions and oxygen  Injection Technique:  Single shot  Procedures: ultrasound guided and nerve stimulator    Patient Position: supine  Prep: chlorhexidine    Needle Type:  Stimuplex  Needle Gauge:  22 G  Needle Localization:  Nerve stimulator and ultrasound guidance    Assessment:  Number of attempts:  1  Injection Assessment:  Incremental injection every 5 mL, local visualized surrounding nerve on ultrasound, negative aspiration for blood, no paresthesia and no intravascular symptoms  Patient tolerance:  Patient tolerated the procedure well with no immediate complications  Sciatic block performed with landmark identification. Needle used was 4\" stimuplex 21g. 20cc 0.2% ropivacaine injected intermittently with negative aspiration.

## 2020-10-29 NOTE — PROGRESS NOTES
Problem: Mobility Impaired (Adult and Pediatric)  Goal: *Acute Goals and Plan of Care (Insert Text)  Description: FUNCTIONAL STATUS PRIOR TO ADMISSION: Patient was independent and active without use of DME.    HOME SUPPORT PRIOR TO ADMISSION: The patient lived with  but did not require assist.    Physical Therapy Goals  Initiated 10/29/2020    1. Patient will move from supine to sit and sit to supine  in bed with independence within 4 days. 2. Patient will perform sit to stand with modified independence within 4 days. 3. Patient will ambulate with modified independence for 100 feet with the least restrictive device within 4 days. 4. Patient will ascend/descend 4 stairs with 1 handrail(s) with minimal assistance/contact guard assist within 4 days. 5. Patient will perform home exercise program per protocol with independence within 4 days. 6. Patient will demonstrate AROM 0-90 degrees in operative joint within 4 days. Outcome: Progressing Towards Goal   PHYSICAL THERAPY EVALUATION  Patient: Faustina Christensen (89 y.o. female)  Date: 10/29/2020  Primary Diagnosis: Primary osteoarthritis of right knee [M17.11]  Procedure(s) (LRB):  TOTAL KNEE REPLACEMENT RIGHT - [facility] (Right) Day of Surgery   Precautions:   WBAT, Fall(limit flexion on operative knee to 90 degrees)    ASSESSMENT  Based on the objective data described below, the patient presents with decreased ROM and strength to RLE, decreased bed mobility, transfers and gait following admission for right TKA, makoplasty. Patient has all DME and is scheduled for OP PT. Current Level of Function Impacting Discharge (mobility/balance): Educated patient regarding bed exercises and limit of knee flexion to 90 degrees on operative knee. Handout provided and she expressed understanding. Patient stood and ambulated to bedside commode and back with rolling walker +2 min assist.  Patient attempted to perform self cath while on commode but unsuccessful.  Notified nursing. Patient is a bit impulsive and needs cues to slow down. Patient reported feeling woozy upon return to bed. Vitals stable. Functional Outcome Measure: The patient scored 45/100 on the Barthel outcome measure. Other factors to consider for discharge: none     Patient will benefit from skilled therapy intervention to address the above noted impairments. PLAN :  Recommendations and Planned Interventions: bed mobility training, transfer training, gait training, and therapeutic exercises      Frequency/Duration: Patient will be followed by physical therapy:  twice daily to address goals. Recommendation for discharge: (in order for the patient to meet his/her long term goals)  Outpatient physical therapy follow up recommended for TKA    This discharge recommendation:  Has not yet been discussed the attending provider and/or case management    IF patient discharges home will need the following DME: patient owns DME required for discharge         SUBJECTIVE:   Patient stated  \" I had my left knee done in 2010; things are a lot different now.     OBJECTIVE DATA SUMMARY:   HISTORY:    Past Medical History:   Diagnosis Date    Anemia 09/10/2020    Asymptomatic spider vein     Chronic UTI     Become resistant to Keflex    Floaters, right 09/08/2020    Hypertension     Dr. Fuentes Innocent adjusted medications    Hypotension 09/09/2020    Osteoarthritis     PONV (postoperative nausea and vomiting)     Retinoschisis     Self-catheterizes urinary bladder 09/10/2020    once daily    Status post implantation of urinary electronic stimulator device     turned off for surgery 9/9/2020    Unintended awareness under general anesthesia 2010    Also has small jaw    Urge incontinence 08/25/2010    abdullahi michell; botox     Past Surgical History:   Procedure Laterality Date    HX APPENDECTOMY  2010    HX COLONOSCOPY  11/08/2015       10 years again, dr. arreola    HX GYN      c section times 3    HX GYN      ectopic r fallopian tube resected    HX HYSTERECTOMY  1998    HX ORTHOPAEDIC  2006    right wrist fx    HX ORTHOPAEDIC  2010    left knee replacement     HX ORTHOPAEDIC Right 10/19/2017    Elbow     HX UROLOGICAL  ,   12/3/15    Urethral sling        Personal factors and/or comorbidities impacting plan of care: none    Home Situation  Rails to Enter: Yes  Current DME Used/Available at Home: Cane, straight, Walker, rolling, Safety frame toliet, Grab bars, Shower chair    EXAMINATION/PRESENTATION/DECISION MAKING:   Critical Behavior:  Neurologic State: Alert  Orientation Level: Oriented X4     Safety/Judgement: Good awareness of safety precautions       Skin:  not fully observed    Range Of Motion:  AROM: Within functional limits              RLE AROM  R Knee Flexion: 80  R Knee Extension: 5        Strength:    Strength: Within functional limits(RLE less due to surgery)                    Tone & Sensation:   Tone: Normal              Sensation: Intact                     Functional Mobility:  Bed Mobility:     Supine to Sit: Contact guard assistance  Sit to Supine: Minimum assistance  Scooting: Stand-by assistance  Transfers:  Sit to Stand: Assist x2;Contact guard assistance  Stand to Sit: Assist x2;Contact guard assistance                       Balance:   Sitting: Intact  Standing: Intact; With support  Ambulation/Gait Training:  Distance (ft): 6 Feet (ft)  Assistive Device: Gait belt;Walker, rolling  Ambulation - Level of Assistance: Assist x2;Minimal assistance        Gait Abnormalities: Step to gait  Right Side Weight Bearing: As tolerated     Base of Support: Widened     Speed/Kenia: Pace decreased (<100 feet/min)                                     Therapeutic Exercises: Ankle pumps, quad and heel sets, heel slides, SLR    Functional Measure:  Barthel Index:    Bathin  Bladder:  5(patient performs self catheterization)  Bowels: 10  Groomin  Dressin  Feeding: 10  Mobility: 0  Stairs: 0  Toilet Use: 5  Transfer (Bed to Chair and Back): 5  Total: 45/100       The Barthel ADL Index: Guidelines  1. The index should be used as a record of what a patient does, not as a record of what a patient could do. 2. The main aim is to establish degree of independence from any help, physical or verbal, however minor and for whatever reason. 3. The need for supervision renders the patient not independent. 4. A patient's performance should be established using the best available evidence. Asking the patient, friends/relatives and nurses are the usual sources, but direct observation and common sense are also important. However direct testing is not needed. 5. Usually the patient's performance over the preceding 24-48 hours is important, but occasionally longer periods will be relevant. 6. Middle categories imply that the patient supplies over 50 per cent of the effort. 7. Use of aids to be independent is allowed. Aspen Reyes., Barthel, D.W. ([id number]). Functional evaluation: the Barthel Index. [address] [facility] (14)2. Radha Butcher lore JERALD Blanchard, Bruna Rose., Alayna Bartholomew., [facility], [address] [facility] (1999). Measuring the change indisability after inpatient rehabilitation; comparison of the responsiveness of the Barthel Index and Functional [place] Measure. Journal of Neurology, Neurosurgery, and Psychiatry, 66(4), 516-829. MICHELET Worley, CHILANGO Briones, & Phu Longo, MCarinaA. (2004.) Assessment of post-stroke quality of life in cost-effectiveness studies: The usefulness of the Barthel Index and the EuroQoL-5D.  Quality of Life Research, 15, 176-59           Physical Therapy Evaluation Charge Determination   History Examination Presentation Decision-Making   LOW Complexity : Zero comorbidities / personal factors that will impact the outcome / POC LOW Complexity : 1-2 Standardized tests and measures addressing body structure, function, activity limitation and / or participation in recreation  LOW Complexity : Stable, uncomplicated  Other outcome measures Barthel  LOW       Based on the above components, the patient evaluation is determined to be of the following complexity level: LOW     Pain Rating:  None at this time    Activity Tolerance:   Good  Please refer to the flowsheet for vital signs taken during this treatment. After treatment patient left in no apparent distress:   Supine in bed, Call bell within reach, Bed / chair alarm activated, Caregiver / family present, and Side rails x 3    COMMUNICATION/EDUCATION:   The patients plan of care was discussed with: Registered nurse. Fall prevention education was provided and the patient/caregiver indicated understanding. and Patient/family agree to work toward stated goals and plan of care.     Thank you for this referral.  Jamaica Ny, PT   Time Calculation: 40 mins

## 2020-10-29 NOTE — OP NOTES
OPERATIVE REPORT    Admit Date: 10/29/2020  Admit Diagnosis: PRIMARY OA OF RIGHT KNEE    Date of Procedure: 10/29/2020   Preoperative Diagnosis: PRIMARY OA OF RIGHT KNEE  Postoperative Diagnosis: * No post-op diagnosis entered *    Procedure: Procedure(s):  TOTAL KNEE REPLACEMENT RIGHT - [facility]  Surgeon: Stefanie Roman MD  Assistant(s): None  Anesthesia: Spinal   Estimated Blood Loss: 20cc  Specimens: * No specimens in log *   Complications: None      INDICATIONS:  Spring Villar is a patient with Right knee arthrofibrosis and was indicated for surgery due to knee stiffness. We discussed risks, alternatives and expectations prior to surgery. The patient was seen for medical clearance. PROCEDURE PERFORMED :   The patient was seen in the pre-operative holding area and the proper limb initialized. Questions were answered and the patient was taken to the operating room for anesthesia. The appropriate  time-out was performed prior to the incision. Will complete relaxation the hip was flexed to 90 degrees and pressure applied over the proximal tibia until palpable popping of the scar tissue and fibrous bands was felt with the knee in deep flexion. Range of motion was . A steroid injection was also performed. Tthe patient then recovered from anesthesia and was taken to the post-operative holding area in a stable condition.      POST OPERATIVE CONSIDERATIONS :  WBAT, aggressive PT    Stefanie Roman MD  Pager [phone]

## 2020-10-29 NOTE — ANESTHESIA PROCEDURE NOTES
Spinal Block    Start time: 10/29/2020 7:25 AM  End time: 10/29/2020 7:28 AM  Performed by: Katja Sr MD  Authorized by: Katja Sr MD     Pre-procedure:   Indications: at surgeon's request and primary anesthetic  Preanesthetic Checklist: patient identified, risks and benefits discussed, anesthesia consent, site marked, patient being monitored and timeout performed    Timeout Time: 07:25          Spinal Block:   Patient Position:  Seated  Prep Region:  Lumbar  Prep: chlorhexidine      Location:  L3-4  Technique:  Single shot        Needle:   Needle Type:  Pencan  Needle Gauge:  25 G  Attempts:  1      Events: CSF confirmed, no blood with aspiration and no paresthesia        Assessment:  Insertion:  Uncomplicated  Patient tolerance:  Patient tolerated the procedure well with no immediate complications

## 2020-10-30 VITALS
DIASTOLIC BLOOD PRESSURE: 76 MMHG | WEIGHT: 241.5 LBS | RESPIRATION RATE: 16 BRPM | TEMPERATURE: 98.4 F | SYSTOLIC BLOOD PRESSURE: 127 MMHG | BODY MASS INDEX: 42.79 KG/M2 | HEIGHT: 63 IN | OXYGEN SATURATION: 91 % | HEART RATE: 60 BPM

## 2020-10-30 LAB
ANION GAP SERPL CALC-SCNC: 5 MMOL/L (ref 5–15)
BUN SERPL-MCNC: 23 MG/DL (ref 6–20)
BUN/CREAT SERPL: 23 (ref 12–20)
CALCIUM SERPL-MCNC: 8.9 MG/DL (ref 8.5–10.1)
CHLORIDE SERPL-SCNC: 113 MMOL/L (ref 97–108)
CO2 SERPL-SCNC: 22 MMOL/L (ref 21–32)
CREAT SERPL-MCNC: 0.98 MG/DL (ref 0.55–1.02)
GLUCOSE SERPL-MCNC: 120 MG/DL (ref 65–100)
HGB BLD-MCNC: 9.2 G/DL (ref 11.5–16)
POTASSIUM SERPL-SCNC: 4.2 MMOL/L (ref 3.5–5.1)
SODIUM SERPL-SCNC: 140 MMOL/L (ref 136–145)

## 2020-10-30 PROCEDURE — 74011250637 HC RX REV CODE- 250/637: Performed by: ORTHOPAEDIC SURGERY

## 2020-10-30 PROCEDURE — 99218 HC RM OBSERVATION: CPT

## 2020-10-30 PROCEDURE — 74011250637 HC RX REV CODE- 250/637: Performed by: PHYSICIAN ASSISTANT

## 2020-10-30 PROCEDURE — 97116 GAIT TRAINING THERAPY: CPT

## 2020-10-30 PROCEDURE — 97530 THERAPEUTIC ACTIVITIES: CPT

## 2020-10-30 PROCEDURE — 97535 SELF CARE MNGMENT TRAINING: CPT

## 2020-10-30 PROCEDURE — 36415 COLL VENOUS BLD VENIPUNCTURE: CPT

## 2020-10-30 PROCEDURE — 74011250636 HC RX REV CODE- 250/636: Performed by: PHYSICIAN ASSISTANT

## 2020-10-30 PROCEDURE — 80048 BASIC METABOLIC PNL TOTAL CA: CPT

## 2020-10-30 PROCEDURE — 85018 HEMOGLOBIN: CPT

## 2020-10-30 PROCEDURE — 74011000250 HC RX REV CODE- 250: Performed by: PHYSICIAN ASSISTANT

## 2020-10-30 PROCEDURE — 97165 OT EVAL LOW COMPLEX 30 MIN: CPT

## 2020-10-30 PROCEDURE — 74011000250 HC RX REV CODE- 250: Performed by: ORTHOPAEDIC SURGERY

## 2020-10-30 PROCEDURE — 94640 AIRWAY INHALATION TREATMENT: CPT

## 2020-10-30 RX ADMIN — POLYETHYLENE GLYCOL 3350 17 G: 17 POWDER, FOR SOLUTION ORAL at 09:08

## 2020-10-30 RX ADMIN — AMLODIPINE BESYLATE 5 MG: 5 TABLET ORAL at 06:02

## 2020-10-30 RX ADMIN — CLONIDINE HYDROCHLORIDE 0.3 MG: 0.1 TABLET ORAL at 09:08

## 2020-10-30 RX ADMIN — Medication 10 ML: at 06:02

## 2020-10-30 RX ADMIN — OXYCODONE 10 MG: 5 TABLET ORAL at 09:08

## 2020-10-30 RX ADMIN — DIPHENHYDRAMINE HYDROCHLORIDE 12.5 MG: 50 INJECTION, SOLUTION INTRAMUSCULAR; INTRAVENOUS at 06:13

## 2020-10-30 RX ADMIN — OXYCODONE 10 MG: 5 TABLET ORAL at 06:02

## 2020-10-30 RX ADMIN — BUDESONIDE 500 MCG: 0.5 INHALANT RESPIRATORY (INHALATION) at 07:46

## 2020-10-30 RX ADMIN — ACETAMINOPHEN 650 MG: 325 TABLET ORAL at 12:17

## 2020-10-30 RX ADMIN — DOCUSATE SODIUM 50MG AND SENNOSIDES 8.6MG 1 TABLET: 8.6; 5 TABLET, FILM COATED ORAL at 09:08

## 2020-10-30 RX ADMIN — CIPROFLOXACIN 500 MG: 500 TABLET, FILM COATED ORAL at 09:08

## 2020-10-30 RX ADMIN — CEFAZOLIN SODIUM 2 G: 1 INJECTION, POWDER, FOR SOLUTION INTRAMUSCULAR; INTRAVENOUS at 06:02

## 2020-10-30 RX ADMIN — OXYCODONE 10 MG: 5 TABLET ORAL at 12:17

## 2020-10-30 RX ADMIN — ACETAMINOPHEN 650 MG: 325 TABLET ORAL at 06:02

## 2020-10-30 RX ADMIN — ARFORMOTEROL TARTRATE 15 MCG: 15 SOLUTION RESPIRATORY (INHALATION) at 07:46

## 2020-10-30 RX ADMIN — HYDRALAZINE HYDROCHLORIDE 50 MG: 25 TABLET, FILM COATED ORAL at 09:08

## 2020-10-30 RX ADMIN — CYANOCOBALAMIN TAB 500 MCG 1000 MCG: 500 TAB at 09:08

## 2020-10-30 RX ADMIN — FAMOTIDINE 20 MG: 20 TABLET, FILM COATED ORAL at 09:08

## 2020-10-30 RX ADMIN — Medication 81 MG: at 09:08

## 2020-10-30 RX ADMIN — OXYCODONE 10 MG: 5 TABLET ORAL at 03:02

## 2020-10-30 RX ADMIN — OXYCODONE 10 MG: 5 TABLET ORAL at 15:49

## 2020-10-30 RX ADMIN — BUPROPION HYDROCHLORIDE 200 MG: 100 TABLET, EXTENDED RELEASE ORAL at 09:08

## 2020-10-30 RX ADMIN — SPIRONOLACTONE 25 MG: 25 TABLET ORAL at 09:08

## 2020-10-30 RX ADMIN — Medication 2 TABLET: at 09:08

## 2020-10-30 RX ADMIN — GABAPENTIN 100 MG: 100 CAPSULE ORAL at 09:08

## 2020-10-30 NOTE — PROGRESS NOTES
OCCUPATIONAL THERAPY EVALUATION/DISCHARGE  Patient: Smiley George (53 y.o. female)  Date: 10/30/2020  Primary Diagnosis: Primary osteoarthritis of right knee [M17.11]  Procedure(s) (LRB):  TOTAL KNEE REPLACEMENT RIGHT - [facility]LEEN (Right) 1 Day Post-Op   Precautions:  WBAT, Fall(limit flexion on operative knee to 90 degrees)    ASSESSMENT  Based on the objective data described below, the patient presents with mild impulsivity with movement and impaired balance following admission. Pt is POD 1 s/p R TKA and cleared to participate with therapy. She is received up in bathroom with RN present, able to perform self catheterization with MOD I/Supervision. Noted one LOB upon leaving sink and pt requires cues for safety due to mild impulsivity with movement. She performs LB dressing at EOB with min A to manage sock over R foot. Supportive  present. All questions answered and vitals stable during activity. Current Level of Function (ADLs/self-care):  Supervision toileting; SBA to min A for LB dressing; set up to CGA for standing toileting; set up UB dressing; up to     Functional Outcome Measure: The patient scored 50/100 on the Barthel outcome measure which is indicative of minimal to moderate functional impairment. Other factors to consider for discharge: mild impulsivity; h/o joint surgery; supportive      PLAN :  Recommendation for discharge: (in order for the patient to meet his/her long term goals)  No skilled occupational therapy/ follow up rehabilitation needs identified at this time. This discharge recommendation:  Has been made in collaboration with the attending provider and/or case management    IF patient discharges home will need the following DME: patient owns DME required for discharge       SUBJECTIVE:   Patient stated I am ready to get going.     OBJECTIVE DATA SUMMARY:   HISTORY:   Past Medical History:   Diagnosis Date    Anemia 09/10/2020    Asymptomatic spider vein     Chronic UTI     Become resistant to Keflex    Floaters, right 09/08/2020    Hypertension     Dr. Gini Giles adjusted medications    Hypotension 09/09/2020    Osteoarthritis     PONV (postoperative nausea and vomiting)     Retinoschisis     Self-catheterizes urinary bladder 09/10/2020    once daily    Status post implantation of urinary electronic stimulator device     turned off for surgery 9/9/2020    Unintended awareness under general anesthesia 2010    Also has small jaw    Urge incontinence 08/25/2010    abdullahi michell; botox     Past Surgical History:   Procedure Laterality Date    HX APPENDECTOMY  2010    HX COLONOSCOPY  11/08/2015       10 years again, dr. arreola    HX GYN      c section times 3    HX GYN      ectopic r fallopian tube resected    HX HYSTERECTOMY  1998    HX ORTHOPAEDIC  2006    right wrist fx    HX ORTHOPAEDIC  2010    left knee replacement     HX ORTHOPAEDIC Right 10/19/2017    Elbow     HX UROLOGICAL  2014,   12/3/15    Urethral sling      Prior Level of Function/Environment/Context: independent with ADLs and mobility; drives; lives with   Expanded or extensive additional review of patient history:     Home Situation  Home Environment: Private residence  # Steps to Enter: 4  Rails to Enter: Yes  One/Two Story Residence: Two story, live on 1st floor  Living Alone: No  Support Systems: Spouse/Significant Other/Partner  Patient Expects to be Discharged to[de-identified] Private residence  Current DME Used/Available at Home: [employer], straight, Safety frame [employer], [employer], Shower chair  Tub or Shower Type: Shower(with built in bench)    Hand dominance: Right    EXAMINATION OF PERFORMANCE DEFICITS:  Cognitive/Behavioral Status:  Neurologic State: Alert  Orientation Level: Oriented X4  Cognition: Appropriate safety awareness; Appropriate for age attention/concentration; Appropriate decision making; Follows commands  Perception: Appears intact  Perseveration: No perseveration noted  Safety/Judgement: Awareness of environment; Fall prevention;Home safety; Insight into deficits    Hearing: Auditory  Auditory Impairment: None    Vision/Perceptual:    Corrective Lenses: Glasses    Range of Motion:  AROM: Within functional limits    Strength:  Strength: Within functional limits    Coordination:  Coordination: Within functional limits  Fine Motor Skills-Upper: Left Intact; Right Intact    Gross Motor Skills-Upper: Left Intact; Right Intact    Tone & Sensation:  Tone: Normal  Sensation: Intact    Balance:  Sitting: Intact  Standing: Intact; With support    Functional Mobility and Transfers for ADLs:  Bed Mobility:  Supine to Sit: (Pt received OOB)  Sit to Supine: Minimum assistance  Scooting: Stand-by assistance    Transfers:  Sit to Stand: Contact guard assistance;Assist x1;Additional time  Stand to Sit: Contact guard assistance;Assist x1;Additional time  Bathroom Mobility: Contact guard assistance  Toilet Transfer : Stand-by assistance    ADL Assessment:  Feeding: Independent    Oral Facial Hygiene/Grooming: Stand-by assistance(for standing tasks at sink)    Bathing: Stand-by assistance;Contact guard assistance(infer based on observations)    Upper Body Dressing: Setup    Lower Body Dressing: Stand-by assistance;Minimum assistance(min A to manage socks over toes)    Toileting: Supervision(pt performs self catheterization)    ADL Intervention and task modifications:  Grooming  Grooming Assistance: Stand-by assistance;Contact guard assistance(one LOB as she was moving away from sink)  Position Performed: Standing(at sink with rolling walker)  Washing Hands: Stand-by assistance  Cues: Verbal cues provided(for RW safety)    Lower Body Dressing Assistance  Pants With Elastic Waist: Stand-by assistance  Socks: Minimum assistance(to don over R toes)  Leg Crossed Method Used: No  Position Performed: Seated edge of bed  Cues: Don;Physical assistance; Tactile cues provided;Verbal cues provided;Visual cues provided  Adaptive Equipment Used: Walker    Toileting  Toileting Assistance: Supervision  Bladder Hygiene: Modified independent(pt able to manage self catheterization on toilet)  Clothing Management: Supervision  Adaptive Equipment: Grab bars; Walker    Cognitive Retraining  Safety/Judgement: Awareness of environment; Fall prevention;Home safety; Insight into deficits    Bathing: Patient instructed and indicated understanding when bathing to not submerge wound in water, stand to shower or sponge bathe, cover wound with plastic and tape to ensure no water reaches bandage/wound without cues. Instructed patient to use clean washcloth and towel to clean/pat dry area around incision for infection control. Patient verbalized understanding of same. Instructed patient to perform shower transfer (when ready) dry for safety prior to attempting wet for safety and fall prevention. Instructed patient to have supervision from family member/significant other when performing wet shower transfer for safety. Dressing joint: Patient instructed and demonstrated understanding to don/doff Right LE first/last with Stand-by assistance. Patient instructed and demonstrated to don all clothing while sitting prior to standing, doff all clothing to knees while standing, then sit to doff clothing off from knees to feet in order to facilitate fall prevention, pain management, and energy conservation with Contact guard assistance. Dressing joint reach exercise: To increase independence with lower body dressing, patient instructed and demonstrated to reach down Right LE in a seated position slowly to prevent tearing/shearing until slight pull is felt, hold at end range for 10 seconds, then return to starting upright position with Supervision. Patient instructed to complete three sets of three repetitions each daily.    Home safety: Patient instructed and indicated understanding on home modifications and safety (raise height of ADL objects, appropriate height of chair surfaces, recliner safety, change of floor surfaces, clear pathways) to increase independence and fall prevention. Standing: Patient instructed and demonstrated during ADLs to walk up to sink/counter top/surfaces, step into walker to increase safety of joint and fall prevention with Contact guard assistance. Patient educated about knee anatomy and educated to avoid rotation of Right LE. Instructed to apply concept to ADLs within the home (no twisting of knee during reaching across body, square off while using objects, slide objects along surfaces). Patient instructed and indicated understanding to increase amount of time standing, observe standing position during ADLs in order to increase even weight bearing through bilateral LEs in order to increase independence with ADLs. Goal to be reached 30 days post - op, per orthopedic surgeon or per PT. Rolling Walker Safety: Educated patient about importance of keeping hands free at all times when using rolling walker for fall prevention. Provided information about walker bags/baskets/trays to assist with transporting items safely while in the home to prevent falls. Patient indicated understanding of same. Instructed patient on safe and appropriate hand placement during transfers (push up from sitting surface when standing up, reach back for sitting surface when sitting down, use grab bars, etc.), including never to pull up on rolling walker for fall prevention and safety. Instructed patient to push rolling walker up to surface (countertop, sink, etc.) and  it as opposed to abandoning rolling walker on the side for safety. Patient verbalized understanding of same. Functional Measure:  Barthel Index:    Bathin  Bladder: 5(pt performs self-catheterization)  Bowels: 10  Groomin  Dressin  Feeding: 10  Mobility: 0  Stairs: 0  Toilet Use: 5  Transfer (Bed to Chair and Back): 10  Total: 50/100        The Barthel ADL Index: Guidelines  1.  The index should be used as a record of what a patient does, not as a record of what a patient could do. 2. The main aim is to establish degree of independence from any help, physical or verbal, however minor and for whatever reason. 3. The need for supervision renders the patient not independent. 4. A patient's performance should be established using the best available evidence. Asking the patient, friends/relatives and nurses are the usual sources, but direct observation and common sense are also important. However direct testing is not needed. 5. Usually the patient's performance over the preceding 24-48 hours is important, but occasionally longer periods will be relevant. 6. Middle categories imply that the patient supplies over 50 per cent of the effort. 7. Use of aids to be independent is allowed. Rubi Glendale., Barthel, D.W. ([id number]). Functional evaluation: the Barthel Index. [address] [facility] (14)2. Dinora  lore JERALD Blanchard, Aurea Walsh., Ange Hendricks., [place], [address] [place] [address] (1999). Measuring the change indisability after inpatient rehabilitation; comparison of the responsiveness of the Barthel Index and Functional [place] Measure. Journal of Neurology, Neurosurgery, and Psychiatry, 66(4), 477-125. Kamille Hammonds, N.J.A, CHILANGO Briones, & Di Pearson, M.A. (2004.) Assessment of post-stroke quality of life in cost-effectiveness studies: The usefulness of the Barthel Index and the EuroQoL-5D.  Quality of Life Research, 15, 658-45     Occupational Therapy Evaluation Charge Determination   History Examination Decision-Making   LOW Complexity : Brief history review  LOW Complexity : 1-3 performance deficits relating to physical, cognitive , or psychosocial skils that result in activity limitations and / or participation restrictions  LOW Complexity : No comorbidities that affect functional and no verbal or physical assistance needed to complete eval tasks       Based on the above components, the patient evaluation is determined to be of the following complexity level: LOW   Pain Rating:  Pt reporting minimal to moderate pain; Pre-medicated prior to session. Not limiting to participation. Activity Tolerance:   Fair and SpO2 stable on RA  Please refer to the flowsheet for vital signs taken during this treatment. After treatment patient left in no apparent distress:    Call bell within reach, Caregiver / family present and Seated EOB with PTA present    COMMUNICATION/EDUCATION:   The patients plan of care was discussed with: Physical therapist, Physical therapy assistant and Registered nurse.      Thank you for this referral.  Gabriela Hill OT  Time Calculation: 24 mins

## 2020-10-30 NOTE — PROGRESS NOTES
Problem: Falls - Risk of  Goal: *Absence of Falls  Description: Document [address] Fall Risk and appropriate interventions in the flowsheet. Outcome: Progressing Towards Goal  Note: Fall Risk Interventions:  Mobility Interventions: Bed/chair exit alarm, Communicate number of staff needed for ambulation/transfer, Patient to call before getting OOB         Medication Interventions: Bed/chair exit alarm, Evaluate medications/consider consulting pharmacy, Teach patient to arise slowly, Utilize gait belt for transfers/ambulation, Patient to call before getting OOB    Elimination Interventions: Bed/chair exit alarm, Call light in reach, Patient to call for help with toileting needs, Stay With Me (per policy), Toilet paper/wipes in reach, Toileting schedule/hourly rounds, Elevated toilet seat              Problem: Patient Education: Go to Patient Education Activity  Goal: Patient/Family Education  Outcome: Progressing Towards Goal     Problem: Patient Education: Go to Patient Education Activity  Goal: Patient/Family Education  Outcome: Progressing Towards Goal     Problem: Pain  Goal: *Control of Pain  Outcome: Progressing Towards Goal       0730-Bedside and Verbal shift change report given to Iris Lincoln RN (oncoming nurse) by Sunita Verduzco RN (offgoing nurse). Report included the following information SBAR, Kardex, Intake/Output and MAR.

## 2020-10-30 NOTE — FACE TO FACE
The patient was provided a [employer] link to view the pre-operative Joint Replacement Class Video  A pre-operative Patient education booklet specific to hip/knee joint replacement surgery was given to the patient in [facility]. The content of the class was presented using an audio power point presentation specific for patients undergoing hip/knee replacement surgery. Incentive spirometer and CHG bath kits were verbally reviewed. Day of surgery routine and expectations, hospital routine and expectations, nutrition, alcohol, nicotine, medications, infection control, pain management, DVT precautions and equipment, ice therapy, durable medical equipment, exercises, mobility expectations and precautions, home preparation and safety were reviewed in class video. My contact information was shared with the patient to provide further information as requested by the patient related to their upcoming surgery. Patient states that they viewed the joint class video.

## 2020-10-30 NOTE — PROGRESS NOTES
Problem: Falls - Risk of  Goal: *Absence of Falls  Description: Document Sanju Kaur Fall Risk and appropriate interventions in the flowsheet.   Outcome: Progressing Towards Goal  Note: Fall Risk Interventions:  Mobility Interventions: Bed/chair exit alarm, Communicate number of staff needed for ambulation/transfer, Patient to call before getting OOB         Medication Interventions: Teach patient to arise slowly, Patient to call before getting OOB    Elimination Interventions: Patient to call for help with toileting needs, Call light in reach              Problem: Patient Education: Go to Patient Education Activity  Goal: Patient/Family Education  Outcome: Progressing Towards Goal     Problem: Pain  Goal: *Control of Pain  Outcome: Progressing Towards Goal     Problem: Patient Education: Go to Patient Education Activity  Goal: Patient/Family Education  Outcome: Progressing Towards Goal

## 2020-10-30 NOTE — PROGRESS NOTES
DAILY NOTE     ASSESSMENT / PLAN :   1. Disposition : Home discharge today. 2. Comments : Doing well, some N/V with mobilization, home today. POD  1 Day Post-Op s/p Procedure(s):  TOTAL KNEE REPLACEMENT RIGHT - JAYLEEN     SUBJECTIVE :     Concerns : None - doing well. OBJECTIVE :     Vitals:    10/30/20 0730 10/30/20 0746 10/30/20 0945 10/30/20 1110   BP: (!) 114/58  (!) 156/88 128/73   Pulse: 60  78 60   Resp: 16   17   Temp: 98.4 °F (36.9 °C)   98.5 °F (36.9 °C)   SpO2: 91% 92%  90%   Weight:       Height:           Alert and oriented x3. Dressing C/D/I  Sensation is intact to light touch. No calf pain. ANTICOAGULANTS / LABS :       Key Anti-Platelet Anticoagulant Meds             aspirin delayed-release 81 mg tablet (Taking) Take 1 Tab by mouth two (2) times a day.           Labs:  Recent Labs     10/30/20  0424   HGB 9.2*      K 4.2   *   CO2 22   BUN 23*   CREA 0.98   *        Patient mobility  Gait  Base of Support: Widened  Speed/Kenia: Pace decreased (<100 feet/min)  Gait Abnormalities: Step to gait, Decreased step clearance  Ambulation - Level of Assistance: Contact guard assistance  Distance (ft): 50 Feet (ft)  Assistive Device: Walker, rolling, Gait belt        Leonardo Nelson MD  Cell [phone]  Iveth Fuentes PA-C  Cell [phone]  Medical Assistants: [id number] [phone]                 Surgery Scheduler: KERRY Terrazas [phone] ext [phone]

## 2020-10-30 NOTE — PROGRESS NOTES
Occupational Therapy:  10/30/20    Orders received, chart reviewed and patient evaluated by occupational therapy. Pending progression with skilled acute occupational therapy, recommend:  No skilled occupational therapy/ follow up rehabilitation needs identified at this time. Recommend with nursing patient to complete as able in order to maintain strength, endurance and independence: OOB to chair 3x/day with 1 person assist and functional mobility to the bathroom with 1 person assist. Thank you for your assistance. Full evaluation to follow.      Thank you,  Render Danger, OTR/L

## 2020-10-30 NOTE — PROGRESS NOTES
Discharging patient to home. Educated patient and  on discharge information and medication. Patient vital signs remained within normal limits and pain was managed with 10 mg. Roxicodone. Dressing on knee remained clean dry and intact. Patient cleared by therapy for discharge.

## 2020-10-30 NOTE — OP NOTES
OPERATIVE REPORT     Admit Date: 10/29/2020  Admit Diagnosis: Primary osteoarthritis of right knee [M17.11]    Date of Procedure: 10/29/2020   Preoperative Diagnosis: PRIMARY OA OF RIGHT KNEE  Postoperative Diagnosis: PRIMARY OA OF RIGHT KNEE    Procedure: Procedure(s):  TOTAL KNEE REPLACEMENT RIGHT - JAYLEEN  Surgeon: Bin Saleem MD  Assistant(s): Isamar Suarez PA-C  Anesthesia: Spinal   Estimated Blood Loss: 300cc  Specimens: * No specimens in log *   Complications: None       INDICATIONS:   The patient is a 76 y.o., female who has complained of a long history of knee pain. The patient  has failed conservative treatment and presents for definitive operative care. Informed consent obtained including a discussion of the risks and benefits, which include, but are not limited to, bleeding, infection, neurovascular damage, wound complications, pain and stiffness in the knee, periprosthetic loosening, fracture dislocation and DVT, the patient consented for the procedure. DESCRIPTION OF PROCEDURE:        The patient was seen in the preoperative holding area. The patient was positively identified. The limb was initialed,  questions were answered. The patient was given Ancef preop for an antibiotic. The patient was subsequently taken to the operating room. The patient underwent spinal anesthesia. The patient was positioned in the supine position. All bony prominences were well padded. The limb was prepped and draped in a sterile fashion. The appropriate pause for safety was performed. A mid-line incision was created. Utilizing an incision from above the superior pole of the patella distally to the tibial tubercle. The incision was taken down through the skin and subcutaneous tissue until the retinaculum could be identified. This was sharply incised utilizing a medial parapatellar incision. The femoral array was placed at the superior portion of the patella.  The patellar was everted, a planar resurfacing was performed and the drill guide was placed with the appropriate rotation. The medial-based soft tissue was then retracted as well as well as the lateral tissue. Tamiko pins were placed within the incision for thetibial array (one hand breadth proximal to the superior pole of the patella). The femoral and tibial trackers were placed. The hip center or rotation was established. Registration was performed on the femur and tibia. Osteophytes were removed. The knee was balanced in both flexion and extension with force applied. The computer model of implants and position was verified. Once this was complete the MAKOplasty robot was positioned. Standard cuts were made for the tibia first. The tibial cut was removed. The remaining femoral cuts were made with the robot. The blade was changed and the medial meniscus was removed. The tibial preparation was completed. Including removal of residual medial and lateral mensci. Tibial baseplate placement and keel preparation were performed along with drill holes for the tibial baseplate. Final bony osteophytes were removed and the meniscal rim was removed. The knee was injected with 60cc of Morphine / Ketoralac and Lidocaine. Trial implants were placed and range of motion along with overall fit was established with very good integrity of the MCL noted. The tamiko pins and trackers were removed and accounted for prior to closure. All the bony surfaces were irrigated. The tibia was placed first, then the poly, residual osteophytes were removed and then the femur. Finally, the patella was placed and press-fit with the clamp / impaction. There was normal tracking of the patella at the conclusion of the case. The knee was very stable after it was taken through a full range of motion. The wound was closed with 0 Vicryl and then number 2 Quill proximally.  Once this had been completed, the skin was closed with 2-0 Vicryl 4-0 monocryl suture and Dermabond. A sterile dressing was applied. The patient was taken from the operating room in stable condition. OPERATIVE FINDINGS : Severe varus and valgus OA of the knee. IMPLANTS :   Implant Name Type Inv. Item Serial No.  Lot No. LRB No. Used Action   tribeverleylon x3 tibial bearing insert CR   NA [employer] [id number] Right 1 Implanted   BASEPLATE TIB SZ 3 [id number] ML67MM KNEE TRITANIUM 4 CRUCFRM - SNA  BASEPLATE TIB SZ 3 [id number] ML67MM KNEE TRITANIUM 4 CRUCFRM NA [employer] HOW_WD [id number] Right 1 Implanted   COMPONENT FEM SZ 3 R KNEE CRUCE RET CEMENTLESS BEAD W/ ESTHER - SNA  COMPONENT FEM SZ 3 R KNEE CRUCE RET CEMENTLESS BEAD W/ ESTHER NA [employer] HOW_WD [id number] Right 1 Implanted   COMPONENT PAT [id number] SUPERIOR/INFERIOR KNEE - SNA  COMPONENT PAT [id number] SUPERIOR/INFERIOR KNEE NA [employer] HOW_WD [id number] Right 1 Implanted   KNEE K2 TOT JENNA ADV CMTLS -- IMPL CAPPED K2 - [id number]  KNEE K2 TOT JENNA ADV CMTLS -- IMPL CAPPED K2  [employer] HOW  Right 1 Implanted       POST OPERATIVE CONSIDERATIONS :  WBAT    JUSTIFICATION FOR SURGICAL ASSISTANT:   Surgical Assistant, was requried and necessary in this case, to help with soft tissue retraction, extremity positioning, equiment management, implant management, and wound closure.     Evangelista Ardon MD

## 2020-10-30 NOTE — FACE TO FACE
Rounded on patient, f/u from Joint Pre-op Patient Education Class, electronic. Patient states class information was valuable in preparing for surgery. Reviewed ice application, exercises and incentive spirometry use. Patient states their home space is prepared and safe. Questions answered.

## 2020-10-30 NOTE — PROGRESS NOTES
Bedside shift change report given to Deisi Campos (oncoming nurse) by Wilder Sanders  (offgoing nurse). Report included the following information SBAR and Kardex.

## 2020-10-30 NOTE — PROGRESS NOTES
Problem: Mobility Impaired (Adult and Pediatric)  Goal: *Acute Goals and Plan of Care (Insert Text)  Description: FUNCTIONAL STATUS PRIOR TO ADMISSION: Patient was independent and active without use of DME.    HOME SUPPORT PRIOR TO ADMISSION: The patient lived with  but did not require assist.    Physical Therapy Goals  Initiated 10/29/2020    1. Patient will move from supine to sit and sit to supine  in bed with independence within 4 days. 2. Patient will perform sit to stand with modified independence within 4 days. 3. Patient will ambulate with modified independence for 100 feet with the least restrictive device within 4 days. 4. Patient will ascend/descend 4 stairs with 1 handrail(s) with minimal assistance/contact guard assist within 4 days. 5. Patient will perform home exercise program per protocol with independence within 4 days. 6. Patient will demonstrate AROM 0-90 degrees in operative joint within 4 days. Outcome: Progressing Towards Goal  Note:   PHYSICAL THERAPY TREATMENT  Patient: Amarilys Andrew (51 y.o. female)  Date: 10/30/2020  Diagnosis: Primary osteoarthritis of right knee [M17.11]   <principal problem not specified>  Procedure(s) (LRB):  TOTAL KNEE REPLACEMENT RIGHT - JAYLEEN (Right) 1 Day Post-Op  Precautions: WBAT, Fall(limit flexion on operative knee to 90 degrees)  Chart, physical therapy assessment, plan of care and goals were reviewed. ASSESSMENT  Patient continues with skilled PT services and progressing towards goals. Pt  received sitting EOB after working with OT. CGA for functional transfers and gait training using RW. No knee instability noted with WB activity. Pt became diaphoretic and nauseous during activity , assisted pt BTB. RN notified. /88 HR  78.        Current Level of Function Impacting Discharge (mobility/balance): CGA    Other factors to consider for discharge:          PLAN :  Patient continues to benefit from skilled intervention to address the above impairments. Continue treatment per established plan of care. to address goals. Recommendation for discharge: (in order for the patient to meet his/her long term goals)  Outpatient physical therapy follow up recommended for TKA    This discharge recommendation:  Has been made in collaboration with the attending provider and/or case management    IF patient discharges home will need the following DME: patient owns DME required for discharge       SUBJECTIVE:   Patient stated I feel hot.     OBJECTIVE DATA SUMMARY:   Critical Behavior:  Neurologic State: Alert, Appropriate for age  Orientation Level: Oriented X4  Cognition: Appropriate safety awareness, Appropriate for age attention/concentration, Appropriate decision making, Follows commands  Safety/Judgement: Good awareness of safety precautions    Range of Motion:                            Functional Mobility Training:  Bed Mobility:        Sit to Supine: Minimum assistance  Scooting: Stand-by assistance        Transfers:  Sit to Stand: Contact guard assistance;Assist x1;Additional time  Stand to Sit: Contact guard assistance;Assist x1;Additional time                             Balance:  Sitting: Intact  Standing: Intact; With support  Ambulation/Gait Training:  Distance (ft): 50 Feet (ft)  Assistive Device: Walker, rolling;Gait belt  Ambulation - Level of Assistance: Contact guard assistance        Gait Abnormalities: Step to gait; Decreased step clearance        Base of Support: Widened     Speed/Kenia: Pace decreased (<100 feet/min)                       Stairs:               Therapeutic Exercises:     EXERCISE   Sets   Reps   Active Active Assist   Passive Self ROM   Comments   Ankle Pumps   [x]                                        []                                        []                                        []                                           Quad Sets   [x]                                        [] []                                        []                                           Hamstring Sets   []                                        []                                        []                                        []                                           Short Arc Quads   []                                        []                                        []                                        []                                           Knee Extension Stretch     []                                          []                                          [x]                                          []                                           Heel Slides   []                                        []                                        []                                        []                                           Long Arc Quads   []                                        []                                        []                                        []                                           Knee Flexion Stretch   []                                        []                                        []                                        []                                           Straight Leg Raises   []                                        [x]                                        []                                        []                                               Pain Ratin/10    Activity Tolerance:   Fair  Please refer to the flowsheet for vital signs taken during this treatment. After treatment patient left in no apparent distress:   Supine in bed, Call bell within reach, and Caregiver / family present    COMMUNICATION/COLLABORATION:   The patients plan of care was discussed with: Registered nurse.      Quirino Bentley   Time Calculation: 29 mins

## 2020-10-30 NOTE — PROGRESS NOTES
Problem: Mobility Impaired (Adult and Pediatric)  Goal: *Acute Goals and Plan of Care (Insert Text)  Description: FUNCTIONAL STATUS PRIOR TO ADMISSION: Patient was independent and active without use of DME.    HOME SUPPORT PRIOR TO ADMISSION: The patient lived with  but did not require assist.    Physical Therapy Goals  Initiated 10/29/2020    1. Patient will move from supine to sit and sit to supine  in bed with independence within 4 days. 2. Patient will perform sit to stand with modified independence within 4 days. 3. Patient will ambulate with modified independence for 100 feet with the least restrictive device within 4 days. 4. Patient will ascend/descend 4 stairs with 1 handrail(s) with minimal assistance/contact guard assist within 4 days. 5. Patient will perform home exercise program per protocol with independence within 4 days. 6. Patient will demonstrate AROM 0-90 degrees in operative joint within 4 days. 10/30/2020 1450 by Anahi Kennedy  Outcome: Progressing Towards Goal  Note:   PHYSICAL THERAPY TREATMENT  Patient: Katherine Epstein (20 y.o. female)  Date: 10/30/2020  Diagnosis: Primary osteoarthritis of right knee [M17.11]   <principal problem not specified>  Procedure(s) (LRB):  TOTAL KNEE REPLACEMENT RIGHT - JAYLEEN (Right) 1 Day Post-Op  Precautions: WBAT, Fall(limit flexion on operative knee to 90 degrees)  Chart, physical therapy assessment, plan of care and goals were reviewed. ASSESSMENT  Patient continues with skilled PT services and is progressing towards goals. CGA for functional tranfers and gait training using RW. Pt can be impulsive and requires occasional verbal cues for safe technique and to slow down for improved balance. Spouse present throughout session. Denies dizziness lightheadedness with activity. BP on low side 100/49  Pt and spouse verbalized understanding to use RW for all gait and transfers.  Pt is cleared for discharge from PT standpoint    Current Level of Function Impacting Discharge (mobility/balance): CGA    Other factors to consider for discharge: none         PLAN :  Patient continues to benefit from skilled intervention to address the above impairments. Continue treatment per established plan of care. to address goals. Recommendation for discharge: (in order for the patient to meet his/her long term goals)  Outpatient physical therapy follow up recommended for TKA    This discharge recommendation:  Has been made in collaboration with the attending provider and/or case management    IF patient discharges home will need the following DME: patient owns DME required for discharge       SUBJECTIVE:   Patient stated .    OBJECTIVE DATA SUMMARY:   Critical Behavior:  Neurologic State: Alert  Orientation Level: Oriented X4  Cognition: Appropriate safety awareness, Appropriate for age attention/concentration, Appropriate decision making, Follows commands  Safety/Judgement: Awareness of environment, Fall prevention, Home safety, Insight into deficits    Range of Motion:  AROM: Within functional limits                         Functional Mobility Training:  Bed Mobility:     Supine to Sit: Stand-by assistance  Sit to Supine: Minimum assistance;Contact guard assistance  Scooting: Stand-by assistance        Transfers:  Sit to Stand: Contact guard assistance  Stand to Sit: Contact guard assistance                             Balance:  Sitting: Intact  Standing: Intact; With support  Ambulation/Gait Training:  Distance (ft): 50 Feet (ft)  Assistive Device: Walker, rolling;Gait belt  Ambulation - Level of Assistance: Contact guard assistance        Gait Abnormalities: Decreased step clearance; Step to gait        Base of Support: Widened     Speed/Kenia: Pace decreased (<100 feet/min)                       Stairs:  Number of Stairs Trained: 4(4 x 2)  Stairs - Level of Assistance: Contact guard assistance   Rail Use: Both    Therapeutic Exercises:     EXERCISE   Sets Reps   Active Active Assist   Passive Self ROM   Comments   Ankle Pumps   [x]                                        []                                        []                                        []                                           Quad Sets   [x]                                        []                                        []                                        []                                           Hamstring Sets   []                                        []                                        []                                        []                                           Short Arc Quads   [x]                                        []                                        []                                        []                                           Knee Extension Stretch     []                                          []                                          [x]                                          []                                           Heel Slides   []                                        []                                        []                                        []                                           Long Arc Quads   []                                        []                                        []                                        []                                           Knee Flexion Stretch   []                                        []                                        []                                        []                                           Straight Leg Raises   []                                        []                                        []                                        []                                               Pain Ratin/10    Activity Tolerance:   Good  Please refer to the flowsheet for vital signs taken during this treatment.     After treatment patient left in no apparent distress:   Sitting in chair, Call bell within reach, and Caregiver / family present    COMMUNICATION/COLLABORATION:   The patients plan of care was discussed with: Registered nurse. Peg Hoang   Time Calculation: 25 mins          10/30/2020 1001 by Neena Grayson  Outcome: Progressing Towards Goal  Note:   PHYSICAL THERAPY TREATMENT  Patient: Elaine Newby (2 y.o. female)  Date: 10/30/2020  Diagnosis: Primary osteoarthritis of right knee [M17.11]   <principal problem not specified>  Procedure(s) (LRB):  TOTAL KNEE REPLACEMENT RIGHT - JAYLEEN (Right) 1 Day Post-Op  Precautions: WBAT, Fall(limit flexion on operative knee to 90 degrees)  Chart, physical therapy assessment, plan of care and goals were reviewed. ASSESSMENT  Patient continues with skilled PT services and progressing towards goals. Pt  received sitting EOB after working with OT. CGA for functional transfers and gait training using RW. No knee instability noted with WB activity. Pt became diaphoretic and nauseous during activity , assisted pt BTB. RN notified. /88 HR  78. Current Level of Function Impacting Discharge (mobility/balance): CGA    Other factors to consider for discharge:          PLAN :  Patient continues to benefit from skilled intervention to address the above impairments. Continue treatment per established plan of care. to address goals. Recommendation for discharge: (in order for the patient to meet his/her long term goals)  Outpatient physical therapy follow up recommended for TKA    This discharge recommendation:  Has been made in collaboration with the attending provider and/or case management    IF patient discharges home will need the following DME: patient owns DME required for discharge       SUBJECTIVE:   Patient stated I feel hot.     OBJECTIVE DATA SUMMARY:   Critical Behavior:  Neurologic State: Alert, Appropriate for age  Orientation Level: Oriented X4  Cognition: Appropriate safety awareness, Appropriate for age attention/concentration, Appropriate decision making, Follows commands  Safety/Judgement: Good awareness of safety precautions    Range of Motion:                            Functional Mobility Training:  Bed Mobility:        Sit to Supine: Minimum assistance  Scooting: Stand-by assistance        Transfers:  Sit to Stand: Contact guard assistance;Assist x1;Additional time  Stand to Sit: Contact guard assistance;Assist x1;Additional time                             Balance:  Sitting: Intact  Standing: Intact; With support  Ambulation/Gait Training:  Distance (ft): 50 Feet (ft)  Assistive Device: Walker, rolling;Gait belt  Ambulation - Level of Assistance: Contact guard assistance        Gait Abnormalities: Step to gait; Decreased step clearance        Base of Support: Widened     Speed/Kenia: Pace decreased (<100 feet/min)                       Stairs:               Therapeutic Exercises:     EXERCISE   Sets   Reps   Active Active Assist   Passive Self ROM   Comments   Ankle Pumps   [x]                                        []                                        []                                        []                                           Quad Sets   [x]                                        []                                        []                                        []                                           Hamstring Sets   []                                        []                                        []                                        []                                           Short Arc Quads   []                                        []                                        []                                        []                                           Knee Extension Stretch     []                                          []                                          [x]                                          [] Heel Slides   []                                        []                                        []                                        []                                           Long Arc Quads   []                                        []                                        []                                        []                                           Knee Flexion Stretch   []                                        []                                        []                                        []                                           Straight Leg Raises   []                                        [x]                                        []                                        []                                               Pain Ratin/10    Activity Tolerance:   Fair  Please refer to the flowsheet for vital signs taken during this treatment. After treatment patient left in no apparent distress:   Supine in bed, Call bell within reach, and Caregiver / family present    COMMUNICATION/COLLABORATION:   The patients plan of care was discussed with: Registered nurse.      Gage Merlos   Time Calculation: 29 mins

## 2020-10-30 NOTE — PROGRESS NOTES
Met with patient this morning while in OT, reviewed and signed OBS letter.  in the room as well, working online while being observant and attentive to OT working with patient. She said she has equipment needed from the time she had a knee replaced on the other side ten years ago. No HH needs anticipated per OT, will be going outpatient. Care Management Interventions  PCP Verified by CM:  Yes  Mode of Transport at Discharge: Self  Transition of Care Consult (CM Consult): (therapy recommending outpatient follow up.  no home health needed. )  Current Support Network: Lives with Spouse  Discharge Location  Discharge Placement: Home with outpatient services

## 2020-11-05 ENCOUNTER — TELEPHONE (OUTPATIENT)
Dept: SURGERY | Age: 65
End: 2020-11-05

## 2020-11-05 NOTE — TELEPHONE ENCOUNTER
Post Discharge Phone placed to patient after Joint Replacement surgery   Spoke with patient    States discharge instructions were clear and easy to understand has no questions  Patient was able to fill prescriptions, medications questions answered   States pain is tolerable and pain medication is effective.    Patient is taking tylenol and a narcotic occassionly  Patient is using a walker without difficulty, moving every hour  Able to do exercises regularly  Bruising is moderate  Swelling is moderate  Patient is elevating leg and using ice with good relief  States PT removed dressing yesterday, incision oozing and crusty, patient has history of sensitivity to dermabond,  She has been in contact with Dr. Marlen Roman and received his instructions for care of incision  Denies N/V, appetite is good   Constipation is mild, had BM  Follow up appointment is scheduled with surgeon   PT is scheduled yesterday  Denies fever, has mild cough from allergies, incentive spirometer use encouraged  denies chest pain, SOB  Patient states that arm is raw at IV site, will follow up as needed  Discussed calling surgeon or PCP for concerns  Patient states needs were met by the staff while in the hospital  Reminded patient to fill out survey  Opportunity given for patient to ask questions

## 2020-11-22 RX ORDER — FLUTICASONE PROPIONATE 50 MCG
SPRAY, SUSPENSION (ML) NASAL
Qty: 1 BOTTLE | Refills: 1 | Status: SHIPPED | OUTPATIENT
Start: 2020-11-22 | End: 2021-01-18

## 2020-11-25 DIAGNOSIS — E78.2 MIXED HYPERLIPIDEMIA: ICD-10-CM

## 2020-11-25 RX ORDER — ATORVASTATIN CALCIUM 20 MG/1
TABLET, FILM COATED ORAL
Qty: 90 TAB | Refills: 0 | Status: SHIPPED | OUTPATIENT
Start: 2020-11-25

## 2020-12-11 NOTE — PROGRESS NOTES
Consultation - Pulmonary Medicine   Demetra Samano 66 y o  male MRN: [id number]  Unit/Bed#: -01 Encounter: [id number]      Assessment:  1  Acute hypoxemic respiratory failure  2  COVID-19 pneumonia  3  Recent CVA    Plan:   Acute hypoxemic respiratory failure secondary to COVID-19 pneumonia  Currently on moderate COVID pathway  Requiring 6 L nasal cannula with sats in the low 90s  No O2 requirement at baseline, titrate to maintain O2 sats greater than 90%  - dexamethasone 6 mg day 2/10  - remdesivir day 2/5  - received convalescent plasma  - continue zinc, multivitamin, vitamin-C   - supportive care with incentive spirometer, self proning will be difficult due to recent stroke and left sided weakness  - procalcitonin has been negative x2, can monitor off antibiotics  - inflammatory markers are elevated, D-dimer is 1 99  Continue on prophylactic dose anticoagulation  Will continue to follow  History of Present Illness   Physician Requesting Consult: Lillian Warren MD  Reason for Consult / Principal Problem: COVID 19  Hx and PE limited by: None  HPI: Demetra Samano is a 66y o  year old male past medical history of recent stroke, hypertension, anemia, COPD who presents with generalized weakness  Patient recently had a CVA, has been feeling fatigued since his CVA for a profound over the past couple days  He denies any shortness for her cough  No other acute complaints  He was found to be hypoxic when EMS arrived, does not use oxygen at baseline  Inpatient consult to Pulmonology  Consult performed by: Effie Adams PA-C  Consult ordered by: Lillian Warren MD          Review of Systems   Constitutional: Positive for fatigue  HENT: Negative  Respiratory: Negative  Cardiovascular: Negative  Gastrointestinal: Negative  Genitourinary: Negative  Musculoskeletal: Negative  Skin: Negative  Allergic/Immunologic: Negative  Neurological: Positive for weakness  Ortho F/u Visit    Pt progressing well. Pain controlled. Medically stable. Discussed plan of care and answered all questions. Will d/c home this afternoon if cleared by therapy.      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     Other mechanical complication of other specified internal prosthetic devices, implants and grafts, sequela     Recurrent depressive disorder ([facility] [address] )     Wedge compression fracture of t11-T12 vertebra, initial encounter for closed fracture ([facility] [address] )     Wheezing      No past surgical history on file  Social History   Social History     Substance and Sexual Activity   Alcohol Use Yes     Social History     Substance and Sexual Activity   Drug Use Yes    Comment: pt states "I take whatever I get a hold of"     E-Cigarette/Vaping     E-Cigarette/Vaping Substances     Social History     Tobacco Use   Smoking Status Current Every Day Smoker     Occupational History:     Family History: No family history on file      Meds/Allergies   all current active meds have been reviewed, pertinent pulmonary meds have been reviewed and current meds:   Current Facility-Administered Medications   Medication Dose Route Frequency    acetaminophen (TYLENOL) tablet 650 mg  650 mg Oral Q6H PRN    ascorbic acid (VITAMIN C) tablet 1,000 mg  1,000 mg Oral Q12H [facility]    aspirin tablet 325 mg  325 mg Oral Daily    atorvastatin (LIPITOR) tablet 40 mg  40 mg Oral HS    bisacodyl (DULCOLAX) rectal suppository 10 mg  10 mg Rectal Daily    cefTRIAXone (ROCEPHIN) 1,000 mg in dextrose 5 % 50 mL IVPB  1,000 mg Intravenous Q24H    cholecalciferol (VITAMIN D3) tablet 2,000 Units  2,000 Units Oral Daily    dexamethasone (DECADRON) injection 6 mg  6 mg Intravenous Q24H    doxycycline hyclate (VIBRAMYCIN) capsule 100 mg  100 mg Oral Q12H [facility]    famotidine (PEPCID) tablet 20 mg  20 mg Oral Daily    gabapentin (NEURONTIN) capsule 600 mg  600 mg Oral TID    heparin (porcine) subcutaneous injection 5,000 Units  5,000 Units Subcutaneous Q8H [address]    lactated ringers infusion  75 mL/hr Intravenous Continuous    magnesium hydroxide (MILK OF MAGNESIA) oral suspension 30 mL  30 mL Oral Daily PRN    melatonin tablet 3 mg  3 mg Oral HS    zinc sulfate (ZINCATE) capsule 220 mg  220 mg Oral Daily    Followed by   Alex Grewal ON 12/18/2020] multivitamin-minerals (CENTRUM ADULTS) tablet 1 tablet  1 tablet Oral Daily    nicotine (NICODERM CQ) 14 mg/24hr TD 24 hr patch 1 patch  1 patch Transdermal Daily    ondansetron (ZOFRAN) injection 4 mg  4 mg Intravenous Q6H PRN    remdesivir (Veklury) 100 mg in sodium chloride 0 9 % 250 mL IVPB  100 mg Intravenous Q24H    traZODone (DESYREL) tablet 25 mg  25 mg Oral HS       No Known Allergies    Objective   Vitals: Blood pressure 136/78, pulse 64, temperature (!) 97 3 °F (36 3 °C), resp  rate 17, height 5' 4" (1 626 m), weight 62 1 kg (136 lb 14 5 oz), SpO2 93 %  ,Body mass index is 23 5 kg/m²  Intake/Output Summary (Last 24 hours) at 12/11/2020 1014  Last data filed at 12/11/2020 0601  Gross per 24 hour   Intake 50 ml   Output 400 ml   Net -350 ml     Invasive Devices     Peripheral Intravenous Line            Peripheral IV 12/10/20 Right Hand 1 day    Peripheral IV 12/10/20 Left Hand less than 1 day                Physical Exam  Constitutional:       General: He is not in acute distress  Neck:      Musculoskeletal: Normal range of motion  Cardiovascular:      Rate and Rhythm: Normal rate and regular rhythm  Pulmonary:      Effort: Pulmonary effort is normal  No respiratory distress  Breath sounds: Examination of the right-lower field reveals rales  Examination of the left-lower field reveals rales  Decreased breath sounds and rales present  No wheezing or rhonchi  Musculoskeletal:      Right lower leg: No edema  Left lower leg: No edema  Comments: Left sided weakness   Skin:     General: Skin is warm and dry     Neurological:      Mental Status: He is alert and oriented to person, place, and time  Psychiatric:         Mood and Affect: Mood normal          Behavior: Behavior normal          Lab Results:   I have personally reviewed pertinent lab results  , CBC:   Lab Results   Component Value Date    WBC 2 73 (L) 12/11/2020    HGB 11 0 (L) 12/11/2020    HCT 33 1 (L) 12/11/2020    MCV 92 12/11/2020     (L) 12/11/2020    MCH 30 5 12/11/2020    MCHC 33 2 12/11/2020    RDW 16 2 (H) 12/11/2020    MPV 9 7 12/11/2020    NRBC 0 12/10/2020   , CMP:   Lab Results   Component Value Date    SODIUM 141 12/11/2020    K 4 7 12/11/2020     12/11/2020    CO2 25 12/11/2020    BUN 30 (H) 12/11/2020    CREATININE 1 17 12/11/2020    CALCIUM 8 5 12/11/2020    AST 55 (H) 12/11/2020    ALT 27 12/11/2020    ALKPHOS 81 12/11/2020    EGFR 62 12/11/2020     Imaging Studies: I have personally reviewed pertinent reports  and I have personally reviewed pertinent films in PACS  EKG, Pathology, and Other Studies: I have personally reviewed pertinent reports      VTE Prophylaxis: Sequential compression device (Venodyne)  and Heparin    Code Status: Level 2 - DNAR: but accepts endotracheal intubation  Advance Directive and Living Will:      Power of :    POLST:

## 2021-01-18 RX ORDER — FLUTICASONE PROPIONATE 50 MCG
SPRAY, SUSPENSION (ML) NASAL
Qty: 1 BOTTLE | Refills: 1 | Status: SHIPPED | OUTPATIENT
Start: 2021-01-18

## 2021-04-13 DIAGNOSIS — J98.01 BRONCHOSPASM: ICD-10-CM

## 2021-04-13 RX ORDER — MONTELUKAST SODIUM 10 MG/1
TABLET ORAL
Qty: 90 TAB | Refills: 1 | Status: SHIPPED | OUTPATIENT
Start: 2021-04-13 | End: 2022-04-23 | Stop reason: SDUPTHER

## 2022-03-19 PROBLEM — R31.29 OTHER MICROSCOPIC HEMATURIA: Status: ACTIVE | Noted: 2018-05-11

## 2022-03-19 PROBLEM — N95.2 ATROPHIC VAGINITIS: Status: ACTIVE | Noted: 2018-06-25

## 2022-03-19 PROBLEM — E66.01 OBESITY, MORBID (HCC): Status: ACTIVE | Noted: 2017-11-29

## 2022-03-19 PROBLEM — M17.11 PRIMARY OSTEOARTHRITIS OF RIGHT KNEE: Status: ACTIVE | Noted: 2020-10-29

## 2022-04-23 DIAGNOSIS — J98.01 BRONCHOSPASM: ICD-10-CM

## 2022-04-23 RX ORDER — MONTELUKAST SODIUM 10 MG/1
TABLET ORAL
Qty: 90 TABLET | Refills: 1 | Status: SHIPPED | OUTPATIENT
Start: 2022-04-23

## 2022-09-16 ENCOUNTER — TRANSCRIBE ORDER (OUTPATIENT)
Dept: SCHEDULING | Age: 67
End: 2022-09-16

## 2022-09-16 DIAGNOSIS — M67.921 TENDINOPATHY OF RIGHT BICEPS TENDON: ICD-10-CM

## 2022-09-16 DIAGNOSIS — S46.011A TRAUMATIC COMPLETE TEAR OF RIGHT ROTATOR CUFF, INITIAL ENCOUNTER: Primary | ICD-10-CM

## 2022-09-16 DIAGNOSIS — M67.921 TENDINOPATHY OF ELBOW, RIGHT: ICD-10-CM

## 2022-09-16 DIAGNOSIS — S46.011A TRAUMATIC TEAR OF RIGHT ROTATOR CUFF: Primary | ICD-10-CM

## 2022-09-27 ENCOUNTER — HOSPITAL ENCOUNTER (OUTPATIENT)
Dept: GENERAL RADIOLOGY | Age: 67
Discharge: HOME | End: 2022-09-27
Attending: ORTHOPAEDIC SURGERY
Payer: COMMERCIAL

## 2022-09-27 ENCOUNTER — HOSPITAL ENCOUNTER (OUTPATIENT)
Dept: CT IMAGING | Age: 67
Discharge: HOME | End: 2022-09-27
Attending: ORTHOPAEDIC SURGERY
Payer: COMMERCIAL

## 2022-09-27 DIAGNOSIS — S46.011A TRAUMATIC COMPLETE TEAR OF RIGHT ROTATOR CUFF, INITIAL ENCOUNTER: ICD-10-CM

## 2022-09-27 DIAGNOSIS — M67.921 TENDINOPATHY OF RIGHT BICEPS TENDON: ICD-10-CM

## 2022-09-27 DIAGNOSIS — S46.011A TRAUMATIC TEAR OF RIGHT ROTATOR CUFF: ICD-10-CM

## 2022-09-27 DIAGNOSIS — M67.921 TENDINOPATHY OF ELBOW, RIGHT: ICD-10-CM

## 2022-09-27 PROCEDURE — 23350 INJECTION FOR SHOULDER X-RAY: CPT

## 2022-09-27 PROCEDURE — 74011000250 HC RX REV CODE- 250

## 2022-09-27 PROCEDURE — 74011000636 HC RX REV CODE- 636: Performed by: RADIOLOGY

## 2022-09-27 PROCEDURE — 73201 CT UPPER EXTREMITY W/DYE: CPT

## 2022-09-27 RX ORDER — LIDOCAINE HYDROCHLORIDE 10 MG/ML
INJECTION, SOLUTION EPIDURAL; INFILTRATION; INTRACAUDAL; PERINEURAL
Status: COMPLETED
Start: 2022-09-27 | End: 2022-09-27

## 2022-09-27 RX ADMIN — LIDOCAINE HYDROCHLORIDE 2 ML: 10 INJECTION, SOLUTION EPIDURAL; INFILTRATION; INTRACAUDAL; PERINEURAL at 14:49

## 2022-09-27 RX ADMIN — IOHEXOL 17 ML: 180 INJECTION INTRAVENOUS at 14:46

## 2022-09-27 RX ADMIN — LIDOCAINE HYDROCHLORIDE 2 ML: 10 INJECTION, SOLUTION EPIDURAL; INFILTRATION; INTRACAUDAL; PERINEURAL at 14:50

## 2022-09-27 RX ADMIN — LIDOCAINE HYDROCHLORIDE 4 ML: 10 INJECTION, SOLUTION EPIDURAL; INFILTRATION; INTRACAUDAL; PERINEURAL at 14:50

## 2022-10-24 NOTE — PERIOP NOTES
Last office note requested from Dr. Lee Clock office ([facility]). Pre- op history and physical requested from PCP katia Mac from [facility]

## 2022-10-25 ENCOUNTER — HOSPITAL ENCOUNTER (OUTPATIENT)
Dept: PREADMISSION TESTING | Age: 67
Discharge: HOME | End: 2022-10-25
Payer: COMMERCIAL

## 2022-10-25 VITALS
BODY MASS INDEX: 44.84 KG/M2 | WEIGHT: 253.09 LBS | SYSTOLIC BLOOD PRESSURE: 145 MMHG | HEART RATE: 46 BPM | OXYGEN SATURATION: 97 % | RESPIRATION RATE: 18 BRPM | TEMPERATURE: 97.1 F | DIASTOLIC BLOOD PRESSURE: 65 MMHG | HEIGHT: 63 IN

## 2022-10-25 LAB
ANION GAP SERPL CALC-SCNC: 6 MMOL/L (ref 5–15)
BUN SERPL-MCNC: 28 MG/DL (ref 6–20)
BUN/CREAT SERPL: 30 (ref 12–20)
CALCIUM SERPL-MCNC: 10.2 MG/DL (ref 8.5–10.1)
CHLORIDE SERPL-SCNC: 108 MMOL/L (ref 97–108)
CO2 SERPL-SCNC: 26 MMOL/L (ref 21–32)
CREAT SERPL-MCNC: 0.93 MG/DL (ref 0.55–1.02)
GLUCOSE SERPL-MCNC: 112 MG/DL (ref 65–100)
POTASSIUM SERPL-SCNC: 4.3 MMOL/L (ref 3.5–5.1)
SODIUM SERPL-SCNC: 140 MMOL/L (ref 136–145)

## 2022-10-25 PROCEDURE — 36415 COLL VENOUS BLD VENIPUNCTURE: CPT

## 2022-10-25 PROCEDURE — 80048 BASIC METABOLIC PNL TOTAL CA: CPT

## 2022-10-25 PROCEDURE — 93005 ELECTROCARDIOGRAM TRACING: CPT

## 2022-10-25 RX ORDER — AMLODIPINE BESYLATE 10 MG/1
TABLET ORAL DAILY
COMMUNITY

## 2022-10-25 RX ORDER — MELOXICAM 15 MG/1
15 TABLET ORAL DAILY
COMMUNITY
End: 2022-10-28

## 2022-10-25 RX ORDER — LEVOTHYROXINE AND LIOTHYRONINE 38; 9 UG/1; UG/1
60 TABLET ORAL DAILY
COMMUNITY

## 2022-10-25 RX ORDER — LOSARTAN POTASSIUM 100 MG/1
100 TABLET ORAL EVERY EVENING
COMMUNITY

## 2022-10-25 RX ORDER — NEBIVOLOL 10 MG/1
10 TABLET ORAL DAILY
COMMUNITY

## 2022-10-25 RX ORDER — BUDESONIDE AND FORMOTEROL FUMARATE DIHYDRATE 160; 4.5 UG/1; UG/1
2 AEROSOL RESPIRATORY (INHALATION) 2 TIMES DAILY
COMMUNITY

## 2022-10-25 NOTE — PERIOP NOTES
[address] [facility] [address], [address] [facility]   MAIN OR                                  [phone]   MAIN PRE OP                          [phone]                                                                                AMBULATORY PRE OP          [phone]  PRE-ADMISSION TESTING    [phone]   Surgery Date:  Thursday 10/27/2022       Is surgery arrival time given by surgeon? NO  If NO, [facility] staff will call you between 3 and 7pm the day before your surgery with your arrival time. Call [phone] after 7pm Monday-Friday if you did not receive this call. INSTRUCTIONS BEFORE YOUR SURGERY   When You  Arrive Arrive at the [address] [facility] on the day of your surgery  Have your insurance card, photo ID, and any copayment (if needed)   Food   and   Drink NO food or drink after midnight the night before surgery    This means NO water, gum, mints, coffee, juice, etc.  No alcohol (beer, wine, liquor) 24 hours before and after surgery   Medications to   TAKE   Morning of Surgery MEDICATIONS TO TAKE THE MORNING OF SURGERY WITH A SIP OF WATER:   Amlodipine  [place] Thyroid  Nebivolol  Symbicort    DO NOT take Spironolactone the morning of surgery. DO NOT take Losartan the evening before surgery. Bring rescue inhaler with you on the day of surgery.     Medications  To  STOP      7 days before surgery Non-Steroidal anti-inflammatory Drugs (NSAID's): for example, Ibuprofen (Advil, Motrin), Naproxen (Aleve)  Aspirin, if taking for pain   Herbal supplements, vitamins, and fish oil  Other: Meloxicam  (Pain medications not listed above, including Tylenol may be taken)   Blood  Thinners N/A   Bathing Clothing  Jewelry  Valuables     If you shower the morning of surgery, please do not apply anything to your skin (lotions, powders, deodorant, or makeup, especially mascara)  Follow Chlorhexidine [employer] body wash instructions provided to you during PAT appointment. Begin 3 days prior to surgery. Do not shave or trim anywhere 24 hours before surgery  Wear your hair loose or down; no pony-tails, buns, or metal hair clips  Wear loose, comfortable, clean clothes  Wear glasses instead of contacts  Leave money, valuables, and jewelry, including body piercings, at home       Going Home - or Spending the Night SAME-DAY SURGERY: You must have a responsible adult drive you home and stay with you 24 hours after surgery     Special Instructions Free  parking 7AM-5PM.        Follow all instructions so your surgery wont be cancelled. Please, be on time. If a situation occurs and you are delayed the day of surgery, call [phone]. If your physical condition changes (like a fever, cold, flu, etc.) call your surgeon. Home medication(s) reviewed and verified via  LIST   VERBAL   during PAT appointment. The patient was contacted by  IN-PERSON  The patient verbalizes understanding of all instructions and  DOES NOT   need reinforcement.

## 2022-10-26 ENCOUNTER — ANESTHESIA EVENT (OUTPATIENT)
Dept: SURGERY | Age: 67
End: 2022-10-26
Payer: COMMERCIAL

## 2022-10-26 LAB
ATRIAL RATE: 45 BPM
CALCULATED P AXIS, ECG09: 54 DEGREES
CALCULATED R AXIS, ECG10: 24 DEGREES
CALCULATED T AXIS, ECG11: 46 DEGREES
DIAGNOSIS, 93000: NORMAL
P-R INTERVAL, ECG05: 184 MS
Q-T INTERVAL, ECG07: 462 MS
QRS DURATION, ECG06: 106 MS
QTC CALCULATION (BEZET), ECG08: 399 MS
VENTRICULAR RATE, ECG03: 45 BPM

## 2022-10-27 ENCOUNTER — ANESTHESIA (OUTPATIENT)
Dept: SURGERY | Age: 67
End: 2022-10-27
Payer: COMMERCIAL

## 2022-10-27 ENCOUNTER — HOSPITAL ENCOUNTER (OUTPATIENT)
Age: 67
Setting detail: OUTPATIENT SURGERY
Discharge: HOME | End: 2022-10-27
Attending: ORTHOPAEDIC SURGERY | Admitting: ORTHOPAEDIC SURGERY
Payer: COMMERCIAL

## 2022-10-27 VITALS
SYSTOLIC BLOOD PRESSURE: 124 MMHG | BODY MASS INDEX: 44.38 KG/M2 | HEIGHT: 63 IN | TEMPERATURE: 97.9 F | DIASTOLIC BLOOD PRESSURE: 44 MMHG | HEART RATE: 89 BPM | WEIGHT: 250.44 LBS | RESPIRATION RATE: 16 BRPM | OXYGEN SATURATION: 94 %

## 2022-10-27 DIAGNOSIS — M75.121 NONTRAUMATIC COMPLETE TEAR OF RIGHT ROTATOR CUFF: Primary | ICD-10-CM

## 2022-10-27 PROCEDURE — 77030004451 HC BUR SHV S&N -B: Performed by: ORTHOPAEDIC SURGERY

## 2022-10-27 PROCEDURE — 77030018834: Performed by: ORTHOPAEDIC SURGERY

## 2022-10-27 PROCEDURE — 77030006884 HC BLD SHV INCIS S&N -B: Performed by: ORTHOPAEDIC SURGERY

## 2022-10-27 PROCEDURE — 77030003601 HC NDL NRV BLK BBMI -A

## 2022-10-27 PROCEDURE — 76030000006 HC AMB SURG OR TIME 3 TO 3.5: Performed by: ORTHOPAEDIC SURGERY

## 2022-10-27 PROCEDURE — 77030037837: Performed by: ORTHOPAEDIC SURGERY

## 2022-10-27 PROCEDURE — 74011250636 HC RX REV CODE- 250/636: Performed by: ORTHOPAEDIC SURGERY

## 2022-10-27 PROCEDURE — 76060000066 HC AMB SURG ANES 3 TO 3.5 HR: Performed by: ORTHOPAEDIC SURGERY

## 2022-10-27 PROCEDURE — 74011250636 HC RX REV CODE- 250/636: Performed by: ANESTHESIOLOGY

## 2022-10-27 PROCEDURE — 76210000046 HC AMBSU PH II REC FIRST 0.5 HR: Performed by: ORTHOPAEDIC SURGERY

## 2022-10-27 PROCEDURE — 76210000035 HC AMBSU PH I REC 1 TO 1.5 HR: Performed by: ORTHOPAEDIC SURGERY

## 2022-10-27 PROCEDURE — 74011250637 HC RX REV CODE- 250/637: Performed by: ANESTHESIOLOGY

## 2022-10-27 PROCEDURE — C1713 ANCHOR/SCREW BN/BN,TIS/BN: HCPCS | Performed by: ORTHOPAEDIC SURGERY

## 2022-10-27 PROCEDURE — 74011000250 HC RX REV CODE- 250: Performed by: ORTHOPAEDIC SURGERY

## 2022-10-27 PROCEDURE — 77030002919 HC SUT FBRTAPE ARTH -B: Performed by: ORTHOPAEDIC SURGERY

## 2022-10-27 PROCEDURE — 77030012711 HC WND ARTHRO ABLT S&N -D: Performed by: ORTHOPAEDIC SURGERY

## 2022-10-27 PROCEDURE — 2709999900 HC NON-CHARGEABLE SUPPLY: Performed by: ORTHOPAEDIC SURGERY

## 2022-10-27 PROCEDURE — 77030032497 HC WRP SHLDR WO BGS SOLM -B

## 2022-10-27 PROCEDURE — 74011000250 HC RX REV CODE- 250: Performed by: ANESTHESIOLOGY

## 2022-10-27 PROCEDURE — 77030021587 HC PNCH F/PSHLK DISP ARTH -B: Performed by: ORTHOPAEDIC SURGERY

## 2022-10-27 PROCEDURE — 74011000258 HC RX REV CODE- 258: Performed by: ANESTHESIOLOGY

## 2022-10-27 PROCEDURE — 77030008496 HC TBNG ARTHSC IRR S&N -B: Performed by: ORTHOPAEDIC SURGERY

## 2022-10-27 DEVICE — PEEK SWIVELOCK C,4.75X19.1MM
Type: IMPLANTABLE DEVICE | Site: SHOULDER | Status: FUNCTIONAL
Brand: ARTHREX®

## 2022-10-27 DEVICE — PEEK SWIVELOCK C, 5.5X19.1MM
Type: IMPLANTABLE DEVICE | Site: SHOULDER | Status: FUNCTIONAL
Brand: ARTHREX®

## 2022-10-27 RX ORDER — ROPIVACAINE HYDROCHLORIDE 5 MG/ML
30 INJECTION, SOLUTION EPIDURAL; INFILTRATION; PERINEURAL AS NEEDED
Status: DISCONTINUED | OUTPATIENT
Start: 2022-10-27 | End: 2022-10-27 | Stop reason: HOSPADM

## 2022-10-27 RX ORDER — SODIUM CHLORIDE 0.9 % (FLUSH) 0.9 %
5-40 SYRINGE (ML) INJECTION AS NEEDED
Status: DISCONTINUED | OUTPATIENT
Start: 2022-10-27 | End: 2022-10-28 | Stop reason: HOSPADM

## 2022-10-27 RX ORDER — HYDROMORPHONE HYDROCHLORIDE 2 MG/ML
.25-1 INJECTION, SOLUTION INTRAMUSCULAR; INTRAVENOUS; SUBCUTANEOUS
Status: DISCONTINUED | OUTPATIENT
Start: 2022-10-27 | End: 2022-10-28 | Stop reason: HOSPADM

## 2022-10-27 RX ORDER — DIPHENHYDRAMINE HYDROCHLORIDE 50 MG/ML
12.5 INJECTION, SOLUTION INTRAMUSCULAR; INTRAVENOUS AS NEEDED
Status: DISCONTINUED | OUTPATIENT
Start: 2022-10-27 | End: 2022-10-27 | Stop reason: HOSPADM

## 2022-10-27 RX ORDER — SODIUM CHLORIDE 0.9 % (FLUSH) 0.9 %
5-40 SYRINGE (ML) INJECTION EVERY 8 HOURS
Status: DISCONTINUED | OUTPATIENT
Start: 2022-10-27 | End: 2022-10-27 | Stop reason: HOSPADM

## 2022-10-27 RX ORDER — SUCCINYLCHOLINE CHLORIDE 20 MG/ML
INJECTION INTRAMUSCULAR; INTRAVENOUS AS NEEDED
Status: DISCONTINUED | OUTPATIENT
Start: 2022-10-27 | End: 2022-10-27 | Stop reason: HOSPADM

## 2022-10-27 RX ORDER — OXYCODONE HYDROCHLORIDE 5 MG/1
5 TABLET ORAL AS NEEDED
Status: DISCONTINUED | OUTPATIENT
Start: 2022-10-27 | End: 2022-10-28 | Stop reason: HOSPADM

## 2022-10-27 RX ORDER — LIDOCAINE HYDROCHLORIDE 20 MG/ML
INJECTION, SOLUTION EPIDURAL; INFILTRATION; INTRACAUDAL; PERINEURAL AS NEEDED
Status: DISCONTINUED | OUTPATIENT
Start: 2022-10-27 | End: 2022-10-27 | Stop reason: HOSPADM

## 2022-10-27 RX ORDER — ROPIVACAINE HYDROCHLORIDE 5 MG/ML
INJECTION, SOLUTION EPIDURAL; INFILTRATION; PERINEURAL AS NEEDED
Status: DISCONTINUED | OUTPATIENT
Start: 2022-10-27 | End: 2022-10-27 | Stop reason: HOSPADM

## 2022-10-27 RX ORDER — DEXAMETHASONE SODIUM PHOSPHATE 4 MG/ML
INJECTION, SOLUTION INTRA-ARTICULAR; INTRALESIONAL; INTRAMUSCULAR; INTRAVENOUS; SOFT TISSUE AS NEEDED
Status: DISCONTINUED | OUTPATIENT
Start: 2022-10-27 | End: 2022-10-27 | Stop reason: HOSPADM

## 2022-10-27 RX ORDER — EPHEDRINE SULFATE/0.9% NACL/PF 50 MG/5 ML
SYRINGE (ML) INTRAVENOUS AS NEEDED
Status: DISCONTINUED | OUTPATIENT
Start: 2022-10-27 | End: 2022-10-27 | Stop reason: HOSPADM

## 2022-10-27 RX ORDER — MIDAZOLAM HYDROCHLORIDE 1 MG/ML
INJECTION, SOLUTION INTRAMUSCULAR; INTRAVENOUS AS NEEDED
Status: DISCONTINUED | OUTPATIENT
Start: 2022-10-27 | End: 2022-10-27 | Stop reason: HOSPADM

## 2022-10-27 RX ORDER — PROPOFOL 10 MG/ML
INJECTION, EMULSION INTRAVENOUS AS NEEDED
Status: DISCONTINUED | OUTPATIENT
Start: 2022-10-27 | End: 2022-10-27 | Stop reason: HOSPADM

## 2022-10-27 RX ORDER — SODIUM CHLORIDE 0.9 % (FLUSH) 0.9 %
5-40 SYRINGE (ML) INJECTION EVERY 8 HOURS
Status: CANCELLED | OUTPATIENT
Start: 2022-10-27

## 2022-10-27 RX ORDER — ONDANSETRON 4 MG/1
4 TABLET, FILM COATED ORAL
Qty: 15 TABLET | Refills: 0 | Status: SHIPPED
Start: 2022-10-27

## 2022-10-27 RX ORDER — ASPIRIN 325 MG
325 TABLET ORAL DAILY
Qty: 14 TABLET | Refills: 0 | Status: SHIPPED
Start: 2022-10-27

## 2022-10-27 RX ORDER — ONDANSETRON 2 MG/ML
INJECTION INTRAMUSCULAR; INTRAVENOUS AS NEEDED
Status: DISCONTINUED | OUTPATIENT
Start: 2022-10-27 | End: 2022-10-27 | Stop reason: HOSPADM

## 2022-10-27 RX ORDER — SODIUM CHLORIDE, SODIUM LACTATE, POTASSIUM CHLORIDE, CALCIUM CHLORIDE 600; 310; 30; 20 MG/100ML; MG/100ML; MG/100ML; MG/100ML
100 INJECTION, SOLUTION INTRAVENOUS CONTINUOUS
Status: DISCONTINUED | OUTPATIENT
Start: 2022-10-27 | End: 2022-10-28 | Stop reason: HOSPADM

## 2022-10-27 RX ORDER — FLUMAZENIL 0.1 MG/ML
0.2 INJECTION INTRAVENOUS
Status: DISCONTINUED | OUTPATIENT
Start: 2022-10-27 | End: 2022-10-27 | Stop reason: HOSPADM

## 2022-10-27 RX ORDER — MIDAZOLAM HYDROCHLORIDE 1 MG/ML
1 INJECTION, SOLUTION INTRAMUSCULAR; INTRAVENOUS AS NEEDED
Status: DISCONTINUED | OUTPATIENT
Start: 2022-10-27 | End: 2022-10-27 | Stop reason: HOSPADM

## 2022-10-27 RX ORDER — NALOXONE HYDROCHLORIDE 0.4 MG/ML
0.2 INJECTION, SOLUTION INTRAMUSCULAR; INTRAVENOUS; SUBCUTANEOUS
Status: DISCONTINUED | OUTPATIENT
Start: 2022-10-27 | End: 2022-10-27 | Stop reason: HOSPADM

## 2022-10-27 RX ORDER — SODIUM CHLORIDE 0.9 % (FLUSH) 0.9 %
5-40 SYRINGE (ML) INJECTION AS NEEDED
Status: DISCONTINUED | OUTPATIENT
Start: 2022-10-27 | End: 2022-10-27 | Stop reason: HOSPADM

## 2022-10-27 RX ORDER — SODIUM CHLORIDE, SODIUM LACTATE, POTASSIUM CHLORIDE, CALCIUM CHLORIDE 600; 310; 30; 20 MG/100ML; MG/100ML; MG/100ML; MG/100ML
125 INJECTION, SOLUTION INTRAVENOUS CONTINUOUS
Status: DISCONTINUED | OUTPATIENT
Start: 2022-10-27 | End: 2022-10-27 | Stop reason: HOSPADM

## 2022-10-27 RX ORDER — LIDOCAINE HYDROCHLORIDE 10 MG/ML
0.1 INJECTION, SOLUTION EPIDURAL; INFILTRATION; INTRACAUDAL; PERINEURAL AS NEEDED
Status: DISCONTINUED | OUTPATIENT
Start: 2022-10-27 | End: 2022-10-27 | Stop reason: HOSPADM

## 2022-10-27 RX ORDER — SCOLOPAMINE TRANSDERMAL SYSTEM 1 MG/1
1 PATCH, EXTENDED RELEASE TRANSDERMAL
Status: DISCONTINUED | OUTPATIENT
Start: 2022-10-27 | End: 2022-10-28 | Stop reason: HOSPADM

## 2022-10-27 RX ORDER — FENTANYL CITRATE 50 UG/ML
50 INJECTION, SOLUTION INTRAMUSCULAR; INTRAVENOUS AS NEEDED
Status: DISCONTINUED | OUTPATIENT
Start: 2022-10-27 | End: 2022-10-27 | Stop reason: HOSPADM

## 2022-10-27 RX ORDER — PHENYLEPHRINE HCL IN 0.9% NACL 0.4MG/10ML
SYRINGE (ML) INTRAVENOUS AS NEEDED
Status: DISCONTINUED | OUTPATIENT
Start: 2022-10-27 | End: 2022-10-27 | Stop reason: HOSPADM

## 2022-10-27 RX ORDER — GLYCOPYRROLATE 0.2 MG/ML
INJECTION INTRAMUSCULAR; INTRAVENOUS AS NEEDED
Status: DISCONTINUED | OUTPATIENT
Start: 2022-10-27 | End: 2022-10-27 | Stop reason: HOSPADM

## 2022-10-27 RX ORDER — SODIUM CHLORIDE, SODIUM LACTATE, POTASSIUM CHLORIDE, CALCIUM CHLORIDE 600; 310; 30; 20 MG/100ML; MG/100ML; MG/100ML; MG/100ML
100 INJECTION, SOLUTION INTRAVENOUS CONTINUOUS
Status: DISCONTINUED | OUTPATIENT
Start: 2022-10-27 | End: 2022-10-27 | Stop reason: HOSPADM

## 2022-10-27 RX ORDER — SODIUM CHLORIDE 0.9 % (FLUSH) 0.9 %
5-40 SYRINGE (ML) INJECTION AS NEEDED
Status: CANCELLED | OUTPATIENT
Start: 2022-10-27

## 2022-10-27 RX ORDER — HYDROMORPHONE HYDROCHLORIDE 2 MG/1
2 TABLET ORAL
Qty: 15 TABLET | Refills: 0 | Status: SHIPPED
Start: 2022-10-27 | End: 2022-11-01

## 2022-10-27 RX ORDER — FENTANYL CITRATE 50 UG/ML
INJECTION, SOLUTION INTRAMUSCULAR; INTRAVENOUS AS NEEDED
Status: DISCONTINUED | OUTPATIENT
Start: 2022-10-27 | End: 2022-10-27 | Stop reason: HOSPADM

## 2022-10-27 RX ORDER — SODIUM CHLORIDE 0.9 % (FLUSH) 0.9 %
5-40 SYRINGE (ML) INJECTION EVERY 8 HOURS
Status: DISCONTINUED | OUTPATIENT
Start: 2022-10-27 | End: 2022-10-28 | Stop reason: HOSPADM

## 2022-10-27 RX ADMIN — Medication 80 MCG: at 13:04

## 2022-10-27 RX ADMIN — DEXAMETHASONE SODIUM PHOSPHATE 4 MG: 4 INJECTION, SOLUTION INTRAMUSCULAR; INTRAVENOUS at 12:33

## 2022-10-27 RX ADMIN — Medication 20 MG: at 13:29

## 2022-10-27 RX ADMIN — FENTANYL CITRATE 50 MCG: 50 INJECTION, SOLUTION INTRAMUSCULAR; INTRAVENOUS at 11:16

## 2022-10-27 RX ADMIN — LIDOCAINE HYDROCHLORIDE 100 MG: 20 INJECTION, SOLUTION INTRAVENOUS at 12:31

## 2022-10-27 RX ADMIN — PROPOFOL 30 MG: 10 INJECTION, EMULSION INTRAVENOUS at 12:48

## 2022-10-27 RX ADMIN — CEFAZOLIN SODIUM 2 G: 1 POWDER, FOR SOLUTION INTRAMUSCULAR; INTRAVENOUS at 12:57

## 2022-10-27 RX ADMIN — SODIUM CHLORIDE, POTASSIUM CHLORIDE, SODIUM LACTATE AND CALCIUM CHLORIDE 125 ML/HR: 600; 310; 30; 20 INJECTION, SOLUTION INTRAVENOUS at 11:04

## 2022-10-27 RX ADMIN — Medication 20 MG: at 13:23

## 2022-10-27 RX ADMIN — PROPOFOL 30 MG: 10 INJECTION, EMULSION INTRAVENOUS at 15:04

## 2022-10-27 RX ADMIN — Medication 80 MCG: at 13:29

## 2022-10-27 RX ADMIN — SODIUM CHLORIDE, POTASSIUM CHLORIDE, SODIUM LACTATE AND CALCIUM CHLORIDE: 600; 310; 30; 20 INJECTION, SOLUTION INTRAVENOUS at 15:06

## 2022-10-27 RX ADMIN — Medication 120 MCG: at 13:23

## 2022-10-27 RX ADMIN — Medication 10 MG: at 14:30

## 2022-10-27 RX ADMIN — FENTANYL CITRATE 50 MCG: 50 INJECTION, SOLUTION INTRAMUSCULAR; INTRAVENOUS at 12:31

## 2022-10-27 RX ADMIN — MIDAZOLAM HYDROCHLORIDE 2 MG: 1 INJECTION, SOLUTION INTRAMUSCULAR; INTRAVENOUS at 12:28

## 2022-10-27 RX ADMIN — FENTANYL CITRATE 50 MCG: 50 INJECTION, SOLUTION INTRAMUSCULAR; INTRAVENOUS at 12:48

## 2022-10-27 RX ADMIN — Medication 10 MG: at 11:23

## 2022-10-27 RX ADMIN — ROPIVACAINE HYDROCHLORIDE 30 ML: 5 INJECTION, SOLUTION EPIDURAL; INFILTRATION; PERINEURAL at 11:21

## 2022-10-27 RX ADMIN — Medication 20 MG: at 13:09

## 2022-10-27 RX ADMIN — SUCCINYLCHOLINE CHLORIDE 160 MG: 20 INJECTION, SOLUTION INTRAMUSCULAR; INTRAVENOUS at 12:33

## 2022-10-27 RX ADMIN — Medication 10 MG: at 13:00

## 2022-10-27 RX ADMIN — ONDANSETRON HYDROCHLORIDE 4 MG: 2 SOLUTION INTRAMUSCULAR; INTRAVENOUS at 15:03

## 2022-10-27 RX ADMIN — Medication 120 MCG: at 13:14

## 2022-10-27 RX ADMIN — PROPOFOL 170 MG: 10 INJECTION, EMULSION INTRAVENOUS at 12:33

## 2022-10-27 RX ADMIN — ONDANSETRON HYDROCHLORIDE 4 MG: 2 SOLUTION INTRAMUSCULAR; INTRAVENOUS at 11:30

## 2022-10-27 RX ADMIN — FENTANYL CITRATE 50 MCG: 50 INJECTION, SOLUTION INTRAMUSCULAR; INTRAVENOUS at 11:14

## 2022-10-27 RX ADMIN — SODIUM CHLORIDE 60 MCG/MIN: 9 INJECTION, SOLUTION INTRAVENOUS at 13:32

## 2022-10-27 RX ADMIN — GLYCOPYRROLATE 0.2 MG: 0.2 INJECTION INTRAMUSCULAR; INTRAVENOUS at 13:06

## 2022-10-27 RX ADMIN — Medication 10 MG: at 11:26

## 2022-10-27 RX ADMIN — MIDAZOLAM HYDROCHLORIDE 2 MG: 1 INJECTION, SOLUTION INTRAMUSCULAR; INTRAVENOUS at 11:14

## 2022-10-27 NOTE — ANESTHESIA POSTPROCEDURE EVALUATION
Procedure(s):  RIGHT SHOULDER ARTHROSCOPY MASSIVE ROTATOR CUFF REPAIR, SUBACROMIAL DECOMPRESSION, DISTAL CLAVICLE EXCISION DEBRIDEMENT  (GEN, REG, BLOCK). general, regional    Anesthesia Post Evaluation      Multimodal analgesia: multimodal analgesia used between 6 hours prior to anesthesia start to PACU discharge  Patient location during evaluation: bedside  Patient participation: complete - patient participated  Level of consciousness: awake  Pain management: adequate  Airway patency: patent  Anesthetic complications: no  Cardiovascular status: acceptable  Respiratory status: acceptable  Hydration status: acceptable        INITIAL Post-op Vital signs:   Vitals Value Taken Time   /44 10/27/22 1700   Temp 36.6 °C (97.9 °F) 10/27/22 1540   Pulse 86 10/27/22 1703   Resp 19 10/27/22 1703   SpO2 94 % 10/27/22 1703   Vitals shown include unvalidated device data.
multiple inpatient hospitalizations  Previous ECT treatments

## 2022-10-27 NOTE — DISCHARGE INSTRUCTIONS
Upper Extremity Surgery Discharge Instructions  Valentine Melvin. Ally Mojica M.D.    **Post-op medications have been sent to the pharmacy. **    Please take the time to review the following instructions before you leave the hospital and use them as guidelines during your recovery from surgery. If you have any questions you may contact our office at [phone]. Wound Care/ Dressing Changes: You may change your dressings as needed. Beginning 2 days after you are discharged from the hospital, you should change your dressings daily. A big, bulky dressing isn't necessary as long as there is no drainage from the incisions. You can put a band-aid or piece of gauze over each incision. It isn't necessary to apply antibiotic ointment to your incisions. If you have steri-strips over you incision, they will start to peel off in 7-10 days as you get them wet. They don't need to be removed before that. When they begin to peel off, you may remove them. You have suture (s) and they will be removed at your follow-up visit with your doctor. Showering/ Bathing: You may shower 2 days after your surgery. Your dressing may be removed for showering. You may get your incisions wet in the shower. Do not vigorously scrub your incisions. Apply a clean, dry dressing after you have dried your incisions. Do not take a bath or get into a swimming pool or Jacuzzi until you follow up with your doctor. Do not soak your incisions under water. Sling:    Keep your arm in the immobilizer/sling at all times except when showering and changing your clothes. When showering or changing, keep your arm at your side. Do not move it away from your body. Ice and Elevation:    Continue ice and elevation consistently for 48 hours after surgery. Ice should be applied 30-40 minutes at a time, with a 20-30 minute break in between. Please do not put ice directly on bare skin.   We recommend putting a towel or cloth to cover the skin.  After 48 hours, you should ice 3 times per day for 20 minutes at a time for the next 5 days. After 1 week from surgery, you may use ice and elevation as needed for pain and swelling. Do not put ice directly onto the skin. Please place a towel between the ice and your skin. Physical Therapy:    Physical Therapy will be discussed with you at your first follow-up appointment. You do not need to start therapy prior to that. Diet:    After surgery begin a clear liquid diet and advance to your regular diet as tolerated. Increase your clear liquid intake for the next 2-3 days. Follow up appointment:    Adiel Davalos will need a follow up appointment in 2 weeks from your surgery. Please call our office at [phone] to schedule that appointment. Returning to work:    Normally we will keep you out of work until you return for your follow-up appointment after surgery. If you feel you are able to return to work prior to this appointment, please call our office. Any additional time out of work can be discussed at your follow-up appointment. Medications: You will be given a prescription for pain medication when you are discharged. Prescriptions for aspirin and/or nausea will be sent directly to your pharmacy. Please use these as prescribed. Please make sure that you take these medications with food. Pain medication can cause constipation and Colace or Milk of Magnesia may be used as needed. Other possible side effects of pain medication are dizziness, headache, nausea, vomiting, and urinary retention. Discontinue the medication if you develop itching, rash, shortness of breath, or difficulties swallowing. If these symptoms become severe or are not relieved by discontinuing the medication, please seek immediate medical attention. Refills of pain medication are authorized during office hours only: 8am-5pm Monday through Friday.    Do not take Tylenol/Acetaminophen in addition to your pain medication as most pain medications already contain this. Do not exceed 4000mg of Tylenol/Acetaminophen per day. You may resume your medication that your were taking prior to surgery. Pain medication may change the effects of any antidepressant medication you may be taking. If you have any questions about possible interactions between your regular medications and the pain medication given, you should consult the physician who prescribes your regular medications. Important Signs and Symptoms:    If any of the following signs or symptoms occur, you should contact our office. Please be advised if a problem arises which you feel requires immediate medical attention you should seek immediate medical attention at the closest ER. A sudden increase in swelling and/or redness or warmth at the area your surgery was performed which is not relieved by rest, ice, and elevation. Oral temperature greater than 101 degrees for 12 hours or more that is not relieved by an increase in fluid intake and taking 2 Tylenol every 4-6 hours. Excessive drainage from your incisions or drainage that has not stopped by 72 hours after surgery. Fever, chills, shortness of breath, chest pain, excessive nausea, vomiting, or other signs or symptoms which are of concern to you. If you have any questions or concerns, please contact Dr. Schuyler Pearson office at [phone] or through the Patient Portal [facility] [employer] [place].            DISCHARGE SUMMARY from Your Nurse    PATIENT INSTRUCTIONS:    AFTER ANESTHESIA & SEDATION, and WHILE TAKING PAIN MEDICINE  After general anesthesia / intravenous sedation and the 24 hours following, and/or while taking prescription Opiates:  Limit your activities  Do not drive and operate hazardous machinery until you have been of all narcotics and sedatives for over 24 hours  Do not make important personal or business decisions  Do  not drink alcoholic beverages  If you have not urinated within 8 hours after discharge, please contact your surgeon on call. SIGNS OF INFECTION, THINGS TO REPORT TO YOUR DOCTOR  Report the following Signs of Infection or General Problems after surgery to your surgeon:  Excessive pain, swelling, redness, drainage, pus or odor of or around the surgical area  Fever/ temperature over 101; Temperature over 100 if on medications (chemotherapy or medicines which affect your ability to fight infections)  Nausea and vomiting lasting longer than 4 hours or if unable to take medications  Any signs of decreased circulation or nerve impairment to extremity: change in color, persistent  numbness, tingling, coldness or increase pain  If you have any questions. GOOD HELP TO FIGHT AN INFECTION  Here are a few tip to help reduce the chance of getting an infection after surgery:  Wash Your Hands  Good handwashing is the most important thing you and your caregiver can do. Wash before and after caring for any wounds. Dry your hand with a clean towel. Wash with soap and water for at least 20 seconds. A TIP: sing the \"Happy Birthday\" song through one time while washing to help with the timing. Use a hand  in between washings. Shower  When your surgeon says it is OK to take a shower, use a new bar of antibacterial soap (if that is what you use, and keep that bar of soap ONLY for your use), or antibacterial body wash. Use a clean wash cloth or sponge when you bathe. Dry off with a clean towel  after every bath - be careful around any wounds, skin staples, sutures or surgical glue over/on wounds. Do not enter swimming pools, hot tubs, lakes, rivers and/or ocean until wounds are healed and your doctor/surgeon says it is OK. Use Clean Sheets  Sleep on freshly laundered sheets after your surgery. Keep the surgery site covered with a clean, dry bandage (if instructed to do so). If the bandage becomes soiled, reapply a new, dry, clean bandage.    Do not allow pets to sleep with you while your wound is healing. Lifestyle Modification and Controlling Your Blood Sugar  Smoking slows wound healing. Stop smoking and limit exposure to second-hand smoke. High blood sugar slows wound healing. Eat a well-balanced diet to provide proper nutrition while healing  Monitor your blood sugar (if you are a diabetic) and take your medications as you are suppose to so you can control you blood sugar after surgery. URINARY RETENTION AFTER SURGERY  Some surgery (a planned, long surgery, bladder surgery, or some surgeries of the abdomen) require the placement of a alexis catheter (a drain tube in the bladder.)  This drain is often removed prior to you coming out of the OR, or is occasionally left in place to be removed before discharge, or sometimes the drain tube is left to be removed in the surgeon's office at a later time. Other surgeries never require a drain in the bladder. But sometimes after surgery, even if a drain was never placed, going to the bathroom can become a problem (you feel like you have to pee, your bladder feels full, but you just cant go.)  In this case, you might need to return to the hospital to have a drain catheter placed. Call your surgeon and tell them if this problem happens to you. COUGH AND DEEP BREATHE  Breathing deep and coughing are very important exercises to do after surgery. Deep breathing and coughing open the little air tubes and air sacks in your lungs. You take deep breaths every day. You may not even notice - it is just something you do when you sigh or yawn. It is a natural exercise you do to keep these air passages open. After surgery, take deep breaths and cough, on purpose. Coughing and deep breathing help prevent bronchitis and pneumonia after surgery. If you had chest or belly surgery, use a pillow as a \"hug adama\" and hold it tightly to your chest or belly when you cough.      DIRECTIONS:  Take 10 to 15 slow deep breaths every hour while awake. Breathe in deeply, and hold it for 2 seconds. Exhale slowly through puckered lips, like blowing up a balloon. After every 4th or 5th deep breath, hug your pillow to your chest or belly and give a hard, deep cough. Yes, it will probably hurt if you had abdominal surgery. But doing this exercise is very important part of healing after surgery. Take your pain medicine to help you do this exercise without too much pain. ANKLE PUMPS    Ankle pumps increase the circulation of oxygenated blood to your lower extremities and decrease your risk for circulation problems such as blood clots. They also stretch the muscles, tendons and ligaments in your foot and ankle, and prevent joint contracture in the ankle and foot, especially after surgeries on the legs. It is important to do ankle pump exercises regularly after surgery because immobility increases your risk for developing a blood clot. Your doctor may also have you take an Aspirin for the next few days as well. If your doctor did not ask you to take an Aspirin, consult with him before starting Aspirin therapy on your own. The exercise is quite simple. Slowly point your foot forward, feeling the muscles on the top of your lower leg stretch, and hold this position for 5 seconds. Next, pull your foot back toward you as far as possible, stretching the calf muscles, and hold that position for 5 seconds. Repeat with the other foot. Perform 10 repetitions every hour while awake for both ankles if possible (down and then up with the foot once is one repetition). You should feel gentle stretching of the muscles in your lower leg when doing this exercise. If you feel pain, or your range of motion is limited, don't push too hard. Only go the limit your joint and muscles will let you go. If you have increasing pain, progressively worsening leg warmth or swelling, STOP the exercise and call your doctor. Other Wound Care information:  [] No additional recommendations. A Large volume of irrigation was used during this surgery - Reinforce dressings as needed with supplies provided, but do not remove bandages until 2 days have passed. Going Home After A  Peripheral Nerve Block    Patient Information    The anesthesiology team has provided for your pain control through a technique called regional anesthesia. As the name implies, anesthesia (decreased or no pain, sensation, or motor control) has been provided to a specific region, whether that be an arm or a leg. How does this happen?  you might ask. While you were sleepy, the anesthesia provider provided medicine to a specific group of nerves either in the neck/shoulder region or in the groin and/or buttock region, similar to the way a dentist might numb a tooth (teeth.)  They typically use an ultrasound machine to know where the medicine is placed in relation to the nerves they wish to numb up or block.   What this means is that for the next few hours, you should expect to have a numb limb. Below are some things we wish for you to read and be familiar with concerning your anesthetized limb. Caring For a Blocked Body Part    General Considerations: The numbness may last up to 24 hours  You must protect your arm or leg. The blocked extremity is numb, weak, and difficult for your brain to locate and communicate with. To do this you should:  Pay attention to the position of the blocked limb at all times. Be very careful when placing hot or cod items on a numb limb. You could cause tissue damage like burns or frostbite if you are unable to feel temperature. Carefully follow your discharge instructions regarding the use of these therapies in you post-operative care. Carefully pad the affected limb. Normally we continually move and adjust the position of our bodies without thinking about it.   This movement and continuous repositioning helps to prevent injuries from immobility. When a limb is numb it still requires this care  Be extremely careful not to bump or hit the numb body part. This can result in an unrecognized injury, at lease until the blocked limb wakes up. It can also result in worse pain of your already post-surgical limb. Arm/Shoulder Blocks: You may experience a droopy eyelid, nasal stuffiness, and redness of the eye after receiving an arm/shoulder block. This is called Phoenixs Syndrome, and is very common. There is no need for concern. You may also experience mild hoarseness, but all of these symptoms should resolve within 24 hours. Some patients may experience mild shortness of breath. A sitting position will help alleviate this and it should resolve within 24 hours. If you experience significant or progressive worsening of the shortness of breath, seek medical attention immediately. Contact Phone Numbers    CALL 911 IN CASE OF AN EMERGENCY. For all other non-emergency inquiries call the [facility]  at [phone] and ask for the anesthesiologist on call to be paged. P.R.I.C.E. INSTRUCTIONS    PRICE is an acronym that stands for Protect, Rest, Ice, Compression, and Elevation (sometimes you might see the acronym RICE.)   Listed below are five activities one can do for an injured limb or soft tissue injury. While much anecdotal understanding learned through many years of experience supports these seemingly common sense treatments, building scientific evidence is showing how and why these treatment principles are proving to be so beneficial.  Below is a breakdown of what the PRICE principles entail to speed healing along. PROTECT may sound like an obvious thing to do, and really, it is common sense. After an injury or surgery, protecting the site that hurts help to prevent further injury. REST is essential for an injured limb.   Like protection, the more you are up on an injured limb, especially in the early stages of an injury, the more damage you can do. Rest means no activities that would involve the use of the injured tissues so that the early stages of healing can begin without  interruption. ICE \"is perhaps the simplest and oldest [therapy] in the treatment of soft tissue injuries. \"4    Ice help decrease swelling in inflamed and damaged tissues, can diminish the feeling of pain and decrease muscle spasms, and, immediately after an injury,  can slow cellular metabolism and help to prevent further tissue injury from oxygen starvation caused by the swelling. 5      COMPRESSION help decrease pain by limiting movement of an injured limb. Compression can be found through the use of an elastic wrap bandage, a cast, splint, or simply a snug cooling cuff or an ice pack and pillow. ELEVATION is a very important intervention. Placing the injury above the level of the heart whenever possible helps decrease swelling by using gravity to one's advantage . Placing the injury above the level of the heart also helps prevent, or at least decrease, the throbbing pain that is sometimes experienced after surgery or injury. Sources:  Muscle injuries: optimizing recovery (2007) Best Practice & Research Clinical Rheumatology Vol. 21, No. 2, pp 317-331, Accessed 9/26/11    PRICE first aid guidelines - Protection, Rest, Ice, Compression and Elevation By Jose Hash, About. com Guide, Updated March 27, 2011, Accessed 9/26/11 [email or web address]/rehab/a/[email or web address] htm    Rest Ice Compression Elevation: RICE for injuries, Accessed 9/26/11 [email or web address]ch. com/rest-ice-compression-elevation. html    The use of ice in the treatment of acute soft-tissue injury (2004) [place], Vol. 32, No. 1, pp 251-261, Accessed 9/26/11 http://ajs.3DVista.com/content/32/1/251.full.pdf+html    Soft tissue damage and healing; theory and techniques, www.iaaf.org, Ch. 9 of  medical page, by nasima Dc Valentina Pelaez 9/27/11 [facility].gl. pdf       Cold Therapy Instructions    Your nurse will provide a cold therapy wrap based upon your surgery, need, and your doctor's orders. INSTRUCTIONS FOR USE:  All cooling wraps produce sustained periods of intense cold. NEVER PLACE PAD ON BARE SKIN OR HAVE CONTACT WITH BARE SKIN  Always Use An Insulating Barrier. Tissue injury can occur if these devices are used improperly. Follow your surgeons instructions carefully regarding the frequency, duration and breaks from cold therapy, and the total length of treatment. Check under the pad barrier every 2 hours for skin injury (see below.)      SIGNS OF SKIN INJURY:  STOP USE AND CONTACT YOUR SURGEON if any of the following occur: Increased pain, burning, increased swelling, itching, blisters, increased redness, discoloration, welts, or other change in skin condition. INDICATIONS:  Back, Hip, Knee or Shoulder Surgery and Post-Operative Treatment; Trauma; Orthopedic Rehabilitation      CONTRAINDICATIONS:  Cold therapy should not be used by persons with Diabetes, Raynaud's or other vasospastic disease, cold hypersensititvity, or compromised local circulation. Certain medical conditions make cold-induced injury more likely. Please consult with your healthcare provider before use. Below is information on the medication(s) your doctor is prescribing for you: The maximum daily dose of acetaminophen was discussed with the patient. She was encouraged not to exceed 3,000 mg of acetaminophen during a 24 hour period and was asked to keep in mind that acetaminophen can also be found in many over-the-counter cold medications as well as narcotics that may be given for pain. The patient expresses understanding of these issues and questions were answered.       4 THINGS ABOUT PAIN MEDICINE I ALWAYS TALK ABOUT:  There are 4 side effects I always talk about for pain medications. They make you sleepy and drowsy. Do not drive a car or operate machines while taking pain medication. Do not make any major decisions. Take a nap. Relax. Let your body recover from the affects of anesthesia and surgery. Some people have quite a problem with itching and... Nausea and/or vomiting. These are mention together because they are a related genetic issue; while some people experience these problems, others do not. These are expected and know side effects. Itching is caused by histamine release - practically all opiate medications can cause this. An over-the-counter anti-histamine can help. Over-the-counter Benadryl® (the generic drug name is diphenhydramine) can help, but may cause increased drowsiness which can be intensified by pain medications. Over-the-counter Claritin® (the generic drug name is loratadine) or Zyrtec® (the generic drug name is cetirizine) may be effective without as much drowsiness as with the Benadryl/diphenhydramine. If you have nausea, like the itching, practically all opioids can cause this. Hopefully your surgeon may have given you some medicine for nausea. If your surgeon did not give you anti-nausea medications, and you are experiencing nausea/vomiting that prevent you from drinking clear liquids, CALL HIM/HER and request them, especially if these issues seem to get worse after you leave the hospital.    Last but not least is the problem of constipation (not rakel able to have a bowel movement - poop.)  All pain medicine can slow down the movement of food through the gut. The slower it goes, the worse it can be. This only adds insult to the injury of surgery. And if you had tummy surgery, like having your gall bladder removed or a hernia repair, YOU DO NOT WANT THIS PROBLEM. There are 4 things I recommend. Drink lots of fluids.   For healthy people with no heart problems, this means at least 64 ounces of liquids or more per day. For example, a Big Gulp® from [employer] is 32 ounces. So you need to drink at least 2  Big Gulp®'s of fluids every day. If you have heart problems you may not be able to do this. Talk to your doctor about what you should do to prevent constipation. Drinking fruit juice like apple, pear, or prune juice gives you extra \"BANG\" for your beverage. These drinks are high in natural fiber. If you are a diabetic, drink sugar-free fluids with fiber additives (see next 2 points.)  Avoid drinking extra fruit juice unless this is a regular part of your diet plan. Eat extra fresh fruits and vegetables. Add extra fiber-products. Fiber products like Metamucil®, Citrucel®, Miralax® or Benefiber® can help. These products are over-the-counter and you do not need a prescription from your doctor. If you have followed these recommendations and still have some difficulty having a good poop, take and over-the-counter stimulant like Dulcolax® (biscodyl)  or Senokot® (senna concentrate). These may help get things moving. Lawrence Rojas MEDICATION AND   SIDE EFFECT GUIDE    The [facility] Insurance MEDICATION AND SIDE EFFECT GUIDE was provided to the PATIENT AND CARE PROVIDER. Information provided includes instruction about drug purpose and common side effects for the following medications:   None - already provided      Medication information added to discharge record on October 27, 2022 at 1:58 PM.      Some information we wish all of our patients to be familiar with and General Information for Healthy Lifestyle choices:    Make a list of your current medications with your Primary Care Provider. Update this list whenever your medications are discontinued, doses are changed, or new medications (including over-the-counter products like ibuprofen, vitamins, or herbal remedies) are added.   Carry medication information at all times in case of emergency situations      No smoking / No tobacco products / Avoid exposure to second hand smoke    Surgeon General's Warning:  Quitting smoking now greatly reduces serious risk to your health. Obesity, smoking, and sedentary lifestyle greatly increases your risk for illness. A healthy diet, regular physical exercise & weight monitoring are important for maintaining a healthy lifestyle. A Note About Congestive Heart Failure: You may be retaining fluid if you have a history of heart failure or if you experience any of the following symptoms:      Weight gain of 3 pounds or more overnight or 5 pounds in a week  Increased swelling in our hands or feet  Shortness of breath while lying flat in bed      Please call your doctor as soon as you notice any of these symptoms; do not wait until your next office visit. A Note About Strokes:  Recognize signs and symptoms of STROKE. The simple mnemonic, F.A.S.T., can help you remember signs of a stroke and what to do if you suspect a stroke is occuring to you or someone you are with:    F - Face looks uneven  A - Arms unable to move, or move evenly  S - Speech is slurred or non-existent  T - Time - CALL 911 as soon as signs and symptoms begin - DO NOT go to bed or wait to see if you get better - TIME IS BRAIN. Warning Signs of HEART ATTACK   Call 911 if you have these symptoms:    Chest discomfort. Most heart attacks involve discomfort in the center of the chest that lasts more than a few minutes, or that goes away and comes back. It can feel like uncomfortable pressure, squeezing, fullness, or pain. Discomfort in other areas of the upper body. Symptoms can include pain or discomfort in one or both arms, the back, neck, jaw, or stomach. Shortness of breath with or without chest discomfort. Other signs may include breaking out in a cold sweat, nausea, or lightheadedness. Don't wait more than five minutes to call 911 - MINUTES MATTER!  Fast action can save your life. Calling 911 is almost always the fastest way to get lifesaving treatment. Emergency Medical Services staff can begin treatment when they arrive -- up to an hour sooner than if someone gets to the hospital by car. Learning About Coronavirus [phone])  Coronavirus [phone]): Overview  What is coronavirus (COVID-19)? The coronavirus disease (COVID-19) is caused by a virus. It is an illness that was first found in [place], [place], in December 2019. It has since spread worldwide. The virus can cause fever, cough, and trouble breathing. In severe cases, it can cause pneumonia and make it hard to breathe without help. It can cause death. Coronaviruses are a large group of viruses. They cause the common cold. They also cause more serious illnesses like [place] East respiratory syndrome (MERS) and severe acute respiratory syndrome (SARS). COVID-19 is caused by a novel coronavirus. That means it's a new type that has not been seen in people before. This virus spreads person-to-person through droplets from coughing and sneezing. It can also spread when you are close to someone who is infected. And it can spread when you touch something that has the virus on it, such as a doorknob or a tabletop. What can you do to protect yourself from coronavirus (COVID-19)? The best way to protect yourself from getting sick is to: Avoid areas where there is an outbreak. Avoid contact with people who may be infected. Wash your hands often with soap or alcohol-based hand sanitizers. Avoid crowds and try to stay at least 6 feet away from other people. Wash your hands often, especially after you cough or sneeze. Use soap and water, and scrub for at least 20 seconds. If soap and water aren't available, use an alcohol-based hand . Avoid touching your mouth, nose, and eyes. What can you do to avoid spreading the virus to others?   To help avoid spreading the virus to others:  Cover your mouth with a tissue when you cough or sneeze. Then throw the tissue in the trash. Use a disinfectant to clean things that you touch often. Stay home if you are sick or have been exposed to the virus. Don't go to school, work, or public areas. And don't use public transportation. If you are sick:  Leave your home only if you need to get medical care. But call the doctor's office first so they know you're coming. And wear a face mask, if you have one. If you have a face mask, wear it whenever you're around other people. It can help stop the spread of the virus when you cough or sneeze. Clean and disinfect your home every day. Use household  and disinfectant wipes or sprays. Take special care to clean things that you grab with your hands. These include doorknobs, remote controls, phones, and handles on your refrigerator and microwave. And don't forget countertops, tabletops, bathrooms, and computer keyboards. When to call for help  Call 911 anytime you think you may need emergency care. For example, call if:  You have severe trouble breathing. (You can't talk at all.)  You have constant chest pain or pressure. You are severely dizzy or lightheaded. You are confused or can't think clearly. Your face and lips have a blue color. You pass out (lose consciousness) or are very hard to wake up. Call your doctor now if you develop symptoms such as:  Shortness of breath. Fever. Cough. If you need to get care, call ahead to the doctor's office for instructions before you go. Make sure you wear a face mask, if you have one, to prevent exposing other people to the virus. Where can you get the latest information? The following health organizations are tracking and studying this virus. Their websites contain the most up-to-date information. Harlen Severin also learn what to do if you think you may have been exposed to the virus. U.S. Centers for Disease Control and Prevention (CDC):  The CDC provides updated news about the disease and travel advice. The website also tells you how to prevent the spread of infection. www.cdc.gov  World Health Organization [facility]): WHO offers information about the virus outbreaks. WHO also has travel advice. www.who.int  Current as of: April 1, 2020               Content Version: 12.4  © 0820-6590 [employer], [employer]orporated. Care instructions adapted under license by your healthcare professional. If you have questions about a medical condition or this instruction, always ask your healthcare professional. [address] any warranty or liability for your use of this information. AT THE COMPLETION OF DISCHARGE INSTRUCTION REVIEW, WE VERIFY:  The discharge information has been reviewed with the patient and caregiver. Questions have been asked and answered meeting patient and caregiver expectations. The patient and caregiver verbalized understanding. Your discharge nurse was Miguel Fulton RN-BC       Board Certified - Pain Management      CONTENTS FOUND IN YOUR DISCHARGE ENVELOPE:  [x]     Surgeon and Hospital Discharge Instructions  []     [facility] Care Provider Card  [x]     Medication & Side Effect Guide            (your newly prescribed medications have been marked/highlighted showing the most common side effects from the classes of drugs on your prescriptions)  [x]     Medication Prescription(s) x 0 ( [] These have been sent electronically to your pharmacy by your surgeon,   - OR -       your surgeon has already provided these to you during a previous/pre-op office visit)  [x]     Pain block and/or block with On-Q Catheter from Anesthesia Service (information included in your instructions above)        []    [facility] Education Information  []     Physical Therapy Prescription  []     Follow-up Appointment Cards  []     Surgery-related Pictures/Media  [x]     Medical device information sheets/pamphlets from their    []     School/work excuse note.   [] /parent work excuse note. The following personal items collected during your admission for safe keeping are returned to you:     Dental Appliance: Dental Appliances: None  Vi acsey: Visual Aid: Glasses  Hearing Aid:    Jewelry: Jewelry: None  Clothing: Clothing: Undergarments, Footwear, Sweater, Shirt, Pants  Other Valuables:  Other Valuables: Eyeglasses  Valuables sent to safe:

## 2022-10-27 NOTE — H&P
Orthopedic Admission History and Physical        NAME: Jef Millan       :  1955       MRN:  [id number]      Subjective:     Patient is a 75 y.o.  female who presents with history of right shoulder pain with MRI confirming retracted rotator cuff tear.     Patient Active Problem List    Diagnosis Date Noted    Nontraumatic complete tear of right rotator cuff 10/27/2022    Primary osteoarthritis of right knee 10/29/2020    Atrophic vaginitis 2018    Other microscopic hematuria 2018    Obesity, morbid ([address] 2017    Urinary tract infection 2016    Nocturia 10/20/2014    Urge incontinence 2010    Muscle wasting and atrophy, not elsewhere classified, unspecified site 06/15/2010    Cystocele, unspecified (CODE) 2010    Endometriosis 2009    HTN (hypertension) 2008    Osteoarthritis 2008    Nephrolithiasis 2008     Past Medical History:   Diagnosis Date    Anemia 09/10/2020    Asymptomatic spider vein     Chronic UTI     Become resistant to Keflex    Difficult intubation     JAW DOES NOT OPEN WIDE    Floaters, right 2020    Hypertension     Dr. Mello Felix adjusted medications    Hypotension 2020    Osteoarthritis     PONV (postoperative nausea and vomiting)     Retinoschisis     Self-catheterizes urinary bladder 09/10/2020    once daily    Status post implantation of urinary electronic stimulator device     turned off for surgery 2020    Thyroid disease     Unintended awareness under general anesthesia     Also has small jaw    Urge incontinence 2010    abdullahi michell; botox      Past Surgical History:   Procedure Laterality Date    HX APPENDECTOMY  2010    HX COLONOSCOPY  2015       10 years again, dr. arreola    HX GYN      c section times 3    HX GYN      ectopic r fallopian tube resected    HX HYSTERECTOMY  1998    HX KNEE REPLACEMENT Right     HX ORTHOPAEDIC  2006    right wrist fx    HX ORTHOPAEDIC  2010    left knee replacement HX ORTHOPAEDIC Right 10/19/2017    Elbow     HX UROLOGICAL  2014,   12/3/15    Urethral sling       Prior to Admission medications    Medication Sig Start Date End Date Taking? Authorizing Provider   amLODIPine (NORVASC) 10 mg tablet Take  by mouth daily. Provider, Historical   thyroid, Pork, ([place] Thyroid) 60 mg tablet Take 60 mg by mouth daily. Provider, Historical   losartan (COZAAR) 100 mg tablet Take 100 mg by mouth every evening. Provider, Historical   meloxicam (MOBIC) 15 mg tablet Take 15 mg by mouth daily. Provider, Historical   nebivoloL (BYSTOLIC) 10 mg tablet Take 10 mg by mouth daily. Provider, Historical   budesonide-formoteroL (Symbicort) 160-4.5 mcg/actuation HFAA Take 2 Puffs by inhalation two (2) times a day. Provider, Historical   montelukast (SINGULAIR) 10 mg tablet TAKE 1 TABLET DAILY  Patient taking differently: Take 10 mg by mouth every evening. 4/23/22   Theresa Melchor MD   fluticasone propionate (FLONASE) 50 mcg/actuation nasal spray SPRAY 2 SPRAYS INTO EACH NOSTRIL EVERY DAY 1/18/21   Theresa Melchor MD   atorvastatin (LIPITOR) 20 mg tablet TAKE 1 TABLET DAILY  Patient taking differently: Take 20 mg by mouth every evening. TAKE 1 TABLET DAILY 11/25/20   Theresa Melchor MD   albuterol (PROVENTIL HFA, VENTOLIN HFA, PROAIR HFA) 90 mcg/actuation inhaler Take 1 Puff by inhalation every four (4) hours as needed for Wheezing. Provider, Historical   cyanocobalamin 1,000 mcg tablet Take 1,000 mcg by mouth daily. Provider, Historical   spironolactone (ALDACTONE) 25 mg tablet Take 1 Tab by mouth daily. Patient taking differently: Take 50 mg by mouth daily. 7/28/20   Sandip Farmer MD   diphenoxylate-atropine (LOMOTIL) 2.5-0.025 mg per tablet Take 1 Tab by mouth three (3) times daily as needed for Diarrhea. Max Daily Amount: 3 Tabs.  6/1/20   Sandip Farmer MD     No current facility-administered medications for this encounter. Current Outpatient Medications   Medication Sig    amLODIPine (NORVASC) 10 mg tablet Take  by mouth daily. thyroid, Pork, ([place] Thyroid) 60 mg tablet Take 60 mg by mouth daily. losartan (COZAAR) 100 mg tablet Take 100 mg by mouth every evening. meloxicam (MOBIC) 15 mg tablet Take 15 mg by mouth daily. nebivoloL (BYSTOLIC) 10 mg tablet Take 10 mg by mouth daily. budesonide-formoteroL (Symbicort) 160-4.5 mcg/actuation HFAA Take 2 Puffs by inhalation two (2) times a day. montelukast (SINGULAIR) 10 mg tablet TAKE 1 TABLET DAILY (Patient taking differently: Take 10 mg by mouth every evening.)    fluticasone propionate (FLONASE) 50 mcg/actuation nasal spray SPRAY 2 SPRAYS INTO EACH NOSTRIL EVERY DAY    atorvastatin (LIPITOR) 20 mg tablet TAKE 1 TABLET DAILY (Patient taking differently: Take 20 mg by mouth every evening. TAKE 1 TABLET DAILY)    albuterol (PROVENTIL HFA, VENTOLIN HFA, PROAIR HFA) 90 mcg/actuation inhaler Take 1 Puff by inhalation every four (4) hours as needed for Wheezing. cyanocobalamin 1,000 mcg tablet Take 1,000 mcg by mouth daily. spironolactone (ALDACTONE) 25 mg tablet Take 1 Tab by mouth daily. (Patient taking differently: Take 50 mg by mouth daily.)    diphenoxylate-atropine (LOMOTIL) 2.5-0.025 mg per tablet Take 1 Tab by mouth three (3) times daily as needed for Diarrhea. Max Daily Amount: 3 Tabs. Allergies   Allergen Reactions    Codeine Nausea and Vomiting    Erythromycin Rash    Hydrocodone Rash      Social History     Tobacco Use    Smoking status: Never    Smokeless tobacco: Never   Substance Use Topics    Alcohol use:  Yes     Alcohol/week: 1.7 standard drinks     Types: 2 Glasses of wine per week     Comment: On Saturday      Family History   Problem Relation Age of Onset    Cancer Father         prostate ca    Cancer Sister         lung ca 55    Heart Disease Mother 68        a fib    Prostate Cancer Brother     Anesth Problems Neg Hx Review of Systems  A comprehensive review of systems was negative except for that written in the HPI. Objective:     No data found. No data recorded. Physical Exam:  General appearance: alert, cooperative, no distress, appears stated age  Lungs: clear to auscultation bilaterally  Heart: regular rate and rhythm, S1, S2 normal, no murmur, click, rub or gallop  Right shoulder pain with no skin changes. Labs: No results found for this or any previous visit (from the past 24 hour(s)). Assessment:   No medical contraindications to proceeding with planned surgery. Patient Active Problem List    Diagnosis Date Noted    Nontraumatic complete tear of right rotator cuff 10/27/2022    Primary osteoarthritis of right knee 10/29/2020    Atrophic vaginitis 06/25/2018    Other microscopic hematuria 05/11/2018    Obesity, morbid ([address] 11/29/2017    Urinary tract infection 02/12/2016    Nocturia 10/20/2014    Urge incontinence 08/25/2010    Muscle wasting and atrophy, not elsewhere classified, unspecified site 06/15/2010    Cystocele, unspecified (CODE) 05/05/2010    Endometriosis 01/30/2009    HTN (hypertension) 11/21/2008    Osteoarthritis 11/21/2008    Nephrolithiasis 11/21/2008         Plan:   Proceed with sugrery  Pt. Stable  Pt. NPO x meds   Medical consultation for Pre-/post-operative medical care.   Anesthesia Consulted for Pre-Op Clearance  Pre-Op labs Ordered

## 2022-10-27 NOTE — PERIOP NOTES
PACU IN REPORT FROM ANESTHESIA    Verbal report received from   [facility]   [] MD/DO-Anesthesiologist    [x] CRNA   [] with student    CHOICE ANESTHESIA:  [x] GENERAL  [] TIVA  [] MAC  [] LOCAL  [x] REGIONAL  [] SPINAL   [] EPIDURAL   **Note the anesthesia record for medications given intraoperatively. **           [] E.R.A.S. PROTOCOL    SURGICAL PROCEDURE: Procedure(s) (LRB):  RIGHT SHOULDER ARTHROSCOPY MASSIVE ROTATOR CUFF REPAIR, SUBACROMIAL DECOMPRESSION, DISTAL CLAVICLE EXCISION DEBRIDEMENT  (GEN, REG, BLOCK) (Right)     SURGEON: Cassandra Olivia MD.    Brief Initial Visual Assessment:    Patient Age: [] Infant(1-12mo)      []Pediatric(1-13yrs)    [] Adolescent(13-18yrs)    [] Adult(18-65yrs)      [x]Geriatric Adult(>65yrs). Patient    [] Alert           [x]Calm & Cooperative      [] Anxious  Appearance: [x] Drowsy      [x] Sedated      [] Unresponsive     Oriented x  3            Airway:     [x] Patent          [] \"Difficult Airway\" report by Anesthesia                        [] Obstructs easily/Obstructed on arrival          [] Manual airway assistance necessary                         [] Airway improved with head/airway repositioning                       Airway Adjuncts Present: [] Oral Airway    [] Nasal Trumpet    [] ETT    [] LMA            Respiratory  [x] Even   [] Labored   [] Shallow   [] Tachypnea   [] Bradypnea  Pattern:    [x] Non-Labored  [] VENT and/or respiratory assistance     being provided. Skin:     [x] Pink [] Dusky    [] Pale        [x] Warm    [] Hot [] Cool       [] Cold   [x]Dry [] Moist [] Diaphoretic     Membranes:  [x] Pink [] Pale       [x] Moist [] Dry     [] Crusty     Pain:   [x] No Acute Discomfort. 0  /10 Scale [x] Verbal Numeric   [] Moderate Discomfort.     [] V.A.S. [] Acute Discomfort.                [] A.N.V.    [] Chronic-Issue Related Discomfort.   [] F.L.A.C.C. Note E-MAR for medications administered.   []Faces, Martinez/Gonzalez    Note assessments documented in flowsheets; any assessment variants to be found in comments or narrative perioperative nurse notes.        Post-anesthesia care now assumed by [facility] BSN, RN-BC

## 2022-10-27 NOTE — BRIEF OP NOTE
Brief Postoperative Note    Patient: Tor Dee  YOB: 1955  MRN: [id number]    Date of Procedure: 10/27/2022     Pre-Op Diagnosis: ROTATOR CUFF TEAR RIGHT SHOULDER, AC joint arthritis, labral tear, proximal biceps tear    Post-Op Diagnosis: Same as preoperative diagnosis. Procedure(s):  RIGHT SHOULDER ARTHROSCOPY MASSIVE ROTATOR CUFF REPAIR, SUBACROMIAL DECOMPRESSION, DISTAL CLAVICLE EXCISION DEBRIDEMENT  (GEN, REG, BLOCK)    Surgeon(s):  Grupo Torrez MD    Surgical Assistant: Surg Asst-1: Petey Chiu    Anesthesia: General     Estimated Blood Loss (mL): Minimal    Complications: None    Specimens: * No specimens in log *     Implants:   Implant Name Type Inv. Item Serial No.  Lot No. LRB No. Used Action   ANCHOR SUT L19.1MM DIA4. 75MM PEEK FULL THRD KNOTLESS EYELET - SNA  ANCHOR SUT L19.1MM DIA4. 75MM PEEK FULL THRD KNOTLESS EYELET NA [employer] [id number] Right 1 Implanted   ANCHOR SUT L19.1MM DIA5. 5MM PEEK FULL THRD KNOTLESS EYELET - SNA  ANCHOR SUT L19.1MM DIA5. 5MM PEEK FULL THRD KNOTLESS EYELET NA [employer] [id number] Right 1 Implanted   ANCHOR SUT L19.1MM DIA5. 5MM PEEK FULL THRD KNOTLESS EYELET - SNA  ANCHOR SUT L19.1MM DIA5. 5MM PEEK FULL THRD KNOTLESS EYELET NA [employer] [id number] Right 1 Implanted   ANCHOR SUT L19.1MM DIA4. 75MM PEEK FULL THRD KNOTLESS EYELET - SNA  ANCHOR SUT L19.1MM DIA4. 75MM PEEK FULL THRD KNOTLESS EYELET NA [employer] [id number] Right 1 Implanted       Drains: * No LDAs found *    Findings: RCT, AC arthritis, labral tear, proximal biceps tendon tear    Electronically Signed by Jag Melchor MD on 10/27/2022 at 5:58 PM

## 2022-10-27 NOTE — PERIOP NOTES
POST ANESTHESIA CARE    DISCHARGE / TRANSFER NOTE  Rio Montiel was:    [x] discharged        via   [x] Wheelchair          to [x] Private Vehicle     [] transferred   [] Carried   [] Taxi / Vehicle \"for Hire\"  [] Walk out  [] Ambulance / Medical Transportation   [] Stretcher  [] Hospital room _**_          [] Bed      Patient was escorted by:      [x] Nurse   [] Volunteer [] Transporter / Technician  [] Parent      [] Spouse / Family /      Patient verbalized     [x] appreciation and was very pleased with care received   [] frustration with care received       throughout their stay. Patient was discharged in     [x] pleasant mood  [] sad mood  [] mad mood . Pain at discharge/transfer was      0  /10. Discharge, medication and follow-up instructions were verbalized as understood prior to discharge  (if applicable for same-day procedures being discharged.)    All personal belongings have been returned to patient, and patient/family verbally confirm receiving belongings as all present.

## 2022-10-27 NOTE — ANESTHESIA PREPROCEDURE EVALUATION
Relevant Problems   CARDIOVASCULAR   (+) HTN (hypertension)      RENAL FAILURE   (+) Nephrolithiasis      ENDOCRINE   (+) Obesity, morbid (HCC)       Anesthetic History     Increased risk of difficult airway and PONV          Review of Systems / Medical History  Patient summary reviewed and pertinent labs reviewed    Pulmonary  Within defined limits                 Neuro/Psych   Within defined limits        Pertinent negatives: No seizures, TIA and CVA   Cardiovascular    Hypertension            Pertinent negatives: No past MI, angina and CHF  Exercise tolerance: >4 METS     GI/Hepatic/Renal  Within defined limits           Pertinent negatives: No renal disease   Endo/Other      Hypothyroidism  Morbid obesity and arthritis  Pertinent negatives: No diabetes   Other Findings              Physical Exam    Airway  Mallampati: III  TM Distance: 4 - 6 cm  Neck ROM: short neck   Mouth opening: Diminished (comment)     Cardiovascular      Rate: normal         Dental    Dentition: Lower dentition intact and Upper dentition intact     Pulmonary  Breath sounds clear to auscultation               Abdominal  GI exam deferred       Other Findings            Anesthetic Plan    ASA: 3  Anesthesia type: general and regional - interscalene block          Induction: Intravenous  Anesthetic plan and risks discussed with: Patient      GETA, video laryngoscope, interscalene nerve block

## 2022-10-27 NOTE — ANESTHESIA PROCEDURE NOTES
Peripheral Block    Start time: 10/27/2022 11:14 AM  End time: 10/27/2022 11:21 AM  Performed by: Gina Victoria MD  Authorized by: Gina Victoria MD       Pre-procedure: Indications: at surgeon's request and post-op pain management    Preanesthetic Checklist: patient identified, risks and benefits discussed, site marked, timeout performed, anesthesia consent given, patient being monitored and fire risk safety assessment completed and verbalized    Timeout Time: 11:14 EDT      Block Type:   Block Type:   Interscalene  Laterality:  Right  Monitoring:  Continuous pulse ox, frequent vital sign checks, heart rate, responsive to questions and oxygen  Injection Technique:  Single shot  Procedures: ultrasound guided    Patient Position: supine  Prep: chlorhexidine    Location:  Interscalene  Needle Type:  Stimuplex  Needle Gauge:  21 G  Needle Localization:  Ultrasound guidance  Med Admin Time: 10/27/2022 11:21 AM    Assessment:  Number of attempts:  1  Injection Assessment:  Incremental injection every 5 mL, local visualized surrounding nerve on ultrasound, negative aspiration for blood, no paresthesia and no intravascular symptoms  Patient tolerance:  Patient tolerated the procedure well with no immediate complications

## 2022-10-28 NOTE — OP NOTES
Operative Report     Patient Name:  Marquita Godinez     :  1955     Date of Surgery: 10/27/2022     Preoperative Diagnosis: Right shoulder massive rotator cuff tear, AC joint arthritis, labral tear, proximal biceps tear     Postoperative Diagnosis:  Right shoulder massive rotator cuff tear, AC joint arthritis, labral tear, proximal biceps tear, adhesions, bursitis     Procedures: Right shoulder arthroscopy with massive rotator cuff repair, subacromial decompression, distal clavicle excision, extensive debridement , limited synovectomy, and bursectomy     Surgeon:  Shakeel Bonilla. Kristal Altamirano MD     Assistant Surgeon:  none     Position:  [place] chair     Anesthesia:  LMA, interscalene block. Drains or Tubes:  None     Complications:  None     Estimated Blood Loss:  Scant     Implants:  2 4.75 mm SwiveLock anchors,  2 5.5 mm SwiveLock anchors, FiberTape suture and TigerTape suture      Postoperative Condition:  Stable        Operative Summary:  Marquita Godinez is a 75 y.o. female with physical exam findings, complaints, and symptoms consistent with rotator cuff tear, AC joint arthritis, bursitis, and impingement of the right shoulder, refractory to conservative treatment. The patient has consented to above procedures with benefits and risks of procedure explained to the patient at length in the clinic and in the preoperative holding area. I discussed with patient again the inherent benefits and risks of procedure which include but are not limited to potential for infection, potential for blood clots and pulmonary emboli, potential for residual pain in the shoulder, potential for arthrofibrosis or frozen shoulder, potential for neurovascular compromise, potential for further progression of arthritic process in the shoulder, and potential for repeat tear of the rotator cuff.   Patient states that she understands the risks associated with surgery and understands there is not a guarantee of outcomes when it comes to surgical intervention and wants to proceed with the operation as explained to her. I discussed with her that she has a massive rotator cuff tear and that as such it may or may not be able to be fixed and that if it does turn out that we can fix it she will likely not start physical therapy for extended period of time likely 2 months or longer after the procedure before she would start physical therapy in that we cannot guarantee outcomes patient states she understands if the repair is not successful that she may end up requiring reverse total shoulder arthroplasty in the future. I signed the patient's right shoulder as the appropriate site for surgery with my initials placed in such a fashion that they would be readily visible even after prepping and draping was performed. Patient was consented to administration of anesthesia by the anesthesia department including placement of interscalene block. Patient was administered preoperative IV antibiotic with 2g Ancef administered prior to making skin incision. Patient was taken to operating room moved from the stretcher to the OR table and administered anesthesia and elevated into beach chair position. The right shoulder was then prepped and draped in standard sterile fashion. Then a preoperative surgical time-out was performed with everyone in the room confirming that the right shoulder was the appropriate site for surgery. After which the shoulder was examined with no signs of adhesive capsulitis and no signs of instability. Surgical landmarks of the shoulder were delineated with a permanent marking pen. Posterior arthroscopy portal was obtained with 11 blade used to make a nick in the skin after which an arthroscope was inserted into the glenohumeral joint with visualization of the glenohumeral joint revealing no signs of any significant cartilage defects to the face of the glenoid and on the head of the humerus.   There was fraying and tear of the anterior, superior, posterior, and inferior labrum, tear of the origin of the long head of the biceps tendon, and with mild and moderate synovitic changes present in the joint capsule. I examined the articular side of the rotator cuff revealing full-thickness tearing of the supraspinatus tendon, full-thickness tearing of the infraspinatus tendon, and mild fraying of the subscapularis tendon. A working anterior arthroscopy portal was obtained and through that I performed extensive glenohumeral joint debridement debriding the anterior, superior, posterior, and inferior labrum with moderate synovectomy performed in the joint capsule. I debrided the origin of the long head of the biceps tendon down to good stable tissue. I debrided the articular portion of the rotator cuff down to good stable tissue with sucker shaver instrument and the anatomic footprint of the rotator cuff down to good bleeding bone. .  The arthroscope was then redirected to the subacromial space. In the subacromial space, I performed extensive bursectomy and lysis adhesions. I then denuded the undersurface of the acromion and identified the subacromial bone spurs. I then performed subacromial decompression using luis a block technique with a high-speed bur. I then performed distal clavicle excision resecting approximately 1.1 cm of the distal clavicle creating space between the clavicle and acromion. The rotator cuff was torn significantly intrasubstance and a long vertical split was present between the anterior and posterior aspect of the rotator cuff with retraction to the level of the glenoid. Meticulous lysis of adhesions was performed debriding scar tissue around the rotator cuff.   The rotator cuff was then repaired back to itself with multiple interrupted suture tape stitches passed with a scorpion suture Passer in till the rotator cuff was converted into a more traditional type appearance such that it could be reattached to the anatomic footprint of the rotator cuff. .  I then debrided the osteophytes at the greater tuberosity and debrided the soft tissue at the anatomic footprint of the rotator cuff and used a sucker shaver and a bur to abrade the bone and help create a good bed of bleeding bone to aid in reinsertion of the rotator cuff. An accessory arthroscopy portal was obtained just lateral to the acromion. I then placed 2 4.75 mm SwiveLock anchor in the anatomic footprint of the rotator cuff with FiberTape suture and TigerTape suture loaded through the eyelet of the anchor. A passport cannula was inserted through the lateral arthroscopy portal and the sutures for the anchor were retrieved. Also through the passport cannula, a scorpion suture passer was used to shuttle the sutures through the rotator cuff. The sutures were retrieved through the anterior portal.  A speed bridge technique was utilized for a double row watertight rotator cuff repair with 2 5.5 mm SwiveLock anchors placed in the greater tuberosity, yielding a near anatomic repair of the rotator cuff with no signs of laxity or dog ears of the repair. A knotted repair was performed medially with the 2 SutureTape sutures. All suture ends were cut. Arthroscopy pictures were taken and excess fluid was drained. Arthroscopy portals were closed using 3-0 nylon suture. A sterile dressing and sling were applied. The plan will be to get patient into physical therapy to begin passive range of motion exercises and see us back in the office in 10-14 days for follow-up with suture removal.     Please note the case took 3 times longer than it would normally take to perform a more straightforward rotator cuff tear in my hands and a modifier 22 will be applied to the case.         Hillary Anderson MD

## 2025-02-04 ENCOUNTER — OFFICE VISIT (OUTPATIENT)
Age: 70
End: 2025-02-04

## 2025-02-04 VITALS
DIASTOLIC BLOOD PRESSURE: 74 MMHG | BODY MASS INDEX: 39.16 KG/M2 | SYSTOLIC BLOOD PRESSURE: 116 MMHG | HEIGHT: 63 IN | RESPIRATION RATE: 16 BRPM | WEIGHT: 221 LBS | TEMPERATURE: 98 F | OXYGEN SATURATION: 95 % | HEART RATE: 54 BPM

## 2025-02-04 DIAGNOSIS — M54.41 ACUTE BILATERAL LOW BACK PAIN WITH RIGHT-SIDED SCIATICA: Primary | ICD-10-CM

## 2025-02-04 RX ORDER — KETOROLAC TROMETHAMINE 30 MG/ML
30 INJECTION, SOLUTION INTRAMUSCULAR; INTRAVENOUS ONCE
Status: COMPLETED | OUTPATIENT
Start: 2025-02-04 | End: 2025-02-04

## 2025-02-04 RX ADMIN — KETOROLAC TROMETHAMINE 30 MG: 30 INJECTION, SOLUTION INTRAMUSCULAR; INTRAVENOUS at 11:31

## 2025-02-04 ASSESSMENT — ENCOUNTER SYMPTOMS
ALLERGIC/IMMUNOLOGIC NEGATIVE: 1
EYES NEGATIVE: 1
BACK PAIN: 1
RESPIRATORY NEGATIVE: 1
GASTROINTESTINAL NEGATIVE: 1

## 2025-02-04 NOTE — PATIENT INSTRUCTIONS
Thank you for visiting [facility] today.    If you develop a fever, gross inability to walk/weakness, severe numbness in bilateral lower extremities, saddle paresthesias, and/or loss of bladder/bowel control, please go to the nearest emergency department for concern of cauda equina.    -Muscle Relaxer (Flexeril, Robaxin, Tizanidine) should only be taken if needed, as it may cause drowsiness or dizziness.  Avoid driving.  Avoid alcohol/drugs that can also make you sleepy.  -Prednisone Steroid Burst:  If given steroids, they should be taken in the morning with food.  Please make sure NOT to take any NSAID's (Aleve, Motrin, Advil, Ibuprofen, Toradol, Diclofenac) while on prednisone as it can increase the risk of gastrointestinal bleeding.  -Alternate between ice and heat.  Heat may be applied for 30 minutes at a time every 4-5 hours.  Take warm epsom salt baths and gentle stretches.  -Lidocaine patches for 12 hours on and 12 hours off.  -Alternate Ibuprofen and Tylenol  -Increase water hydration.  -Avoid lifting heavy objects.  -Back exercises once pain has been controlled    Please follow up with your primary care provider within 2-3 days if  your signs and symptoms have not resolved or worsened.    Please go immediately to the Emergency Department if you develop loss of bowel or bladder function, peripheral numbness/weakness/tingling, shortness of breath, chest pain, and significant fevers above 100.4F.

## (undated) DEVICE — SUTURE TIGERTAPE TIGERWIRE SZ 2-0 L30IN NONABSORBABLE AR72377T

## (undated) DEVICE — ARTHROSCOPY-SFMC: Brand: MEDLINE INDUSTRIES, INC.

## (undated) DEVICE — CANN PASSPORT BUTTON 8MMX4CM --

## (undated) DEVICE — PADDING CST 6IN STERILE --

## (undated) DEVICE — GLOVE ORANGE PI 7 1/2   MSG9075

## (undated) DEVICE — 4-PORT MANIFOLD: Brand: NEPTUNE 2

## (undated) DEVICE — 3M™ IOBAN™ 2 ANTIMICROBIAL INCISE DRAPE 6648EZ: Brand: IOBAN™ 2

## (undated) DEVICE — SPONGE GZ W4XL4IN COT 12 PLY TYP VII WVN C FLD DSGN

## (undated) DEVICE — STRYKER PERFORMANCE SERIES SAGITTAL BLADE: Brand: STRYKER PERFORMANCE SERIES

## (undated) DEVICE — KIT LEG POS DISP -- MAKO

## (undated) DEVICE — PREP KIT PEEL PTCH POVIDONE IOD

## (undated) DEVICE — HOOD: Brand: FLYTE

## (undated) DEVICE — BANDAGE COMPR W6INXL10YD ST M E WHITE/BEIGE

## (undated) DEVICE — GOWN,SIRUS,NONRNF,SETINSLV,2XL,18/CS: Brand: MEDLINE

## (undated) DEVICE — SHOULDER STABILIZATION KIT,                                    DISPOSABLE 12 PER BOX

## (undated) DEVICE — SUTURE VCRL SZ 2-0 L36IN ABSRB UD L36MM CT-1 1/2 CIR J945H

## (undated) DEVICE — MARKER,SKIN,WI/RULER AND LABELS: Brand: MEDLINE

## (undated) DEVICE — 1010 S-DRAPE TOWEL DRAPE 10/BX: Brand: STERI-DRAPE™

## (undated) DEVICE — PAD,ABDOMINAL,5"X9",ST,LF,25/BX: Brand: MEDLINE INDUSTRIES, INC.

## (undated) DEVICE — NDL PRT INJ NSAF BLNT 18GX1.5 --

## (undated) DEVICE — BLADE SURG SAW STD S STL OSC W/ SERR EDGE DISP

## (undated) DEVICE — TAPE,CLOTH/SILK,CURAD,3"X10YD,LF,40/CS: Brand: CURAD

## (undated) DEVICE — COVER,MAYO STAND,STERILE: Brand: MEDLINE

## (undated) DEVICE — T-MAX DISPOSABLE FACE MASK 8 PER BOX

## (undated) DEVICE — DYONICS 25 INFLOW TUBE SET, 3 PER BOX

## (undated) DEVICE — SOLUTION IRRIG 3000ML 0.9% SOD CHL FLX CONT 0797208] ICU MEDICAL INC]

## (undated) DEVICE — INFECTION CONTROL KIT SYS

## (undated) DEVICE — STERILE POLYISOPRENE POWDER-FREE SURGICAL GLOVES WITH EMOLLIENT COATING: Brand: PROTEXIS

## (undated) DEVICE — 5.5 CM ACROMIOBLASTER STRAIGHT                                    BURRS, POWER/EP-1, BRICK RED, 8000                                    MAXIMUM RPM, PACKAGED 6 PER BOX, STERILE

## (undated) DEVICE — KIT DRP FOR RIO ROBOTIC ARM ASST SYS

## (undated) DEVICE — TUBING, SUCTION, 1/4" X 10', STRAIGHT: Brand: MEDLINE

## (undated) DEVICE — SUTURE MCRYL SZ 3-0 L27IN ABSRB UD L24MM PS-1 3/8 CIR PRIM Y936H

## (undated) DEVICE — DRESSING,GAUZE,XEROFORM,CURAD,5"X9",ST: Brand: CURAD

## (undated) DEVICE — ZIMMER® STERILE DISPOSABLE TOURNIQUET CUFF WITH PROTECTIVE SLEEVE AND PLC, DUAL PORT, SINGLE BLADDER, 34 IN. (86 CM)

## (undated) DEVICE — KIT INT FIX FEM TIB CKPT MAKOPLASTY

## (undated) DEVICE — SUTURE VCRL + SZ 1-0 L36IN ABSRB UD CTX 1/2 CIR TAPR PNT VCP977H

## (undated) DEVICE — DRAPE,EXTREMITY,89X128,STERILE: Brand: MEDLINE

## (undated) DEVICE — GLOVE SURG SZ 85 L12IN FNGR ORTHO 126MIL CRM LTX FREE

## (undated) DEVICE — SUTURE STRATAFIX SYMMETRIC SZ 1 L18IN ABSRB VLT CT1 L36CM SXPP1A404

## (undated) DEVICE — DUAL IRRIGATION ADAPTOR

## (undated) DEVICE — PUNCH ENDOSCP DISP FOR 4.5MM PUSHLOCK AND 4.5MM CRKSCR FT

## (undated) DEVICE — Device

## (undated) DEVICE — SYR LR LCK 1ML GRAD NSAF 30ML --

## (undated) DEVICE — PENCIL SMK EVAC L10FT DIA95MM TBNG NONSTICK W ADPT TO 22MM

## (undated) DEVICE — KIT TRK KNEE PROC VIZADISC

## (undated) DEVICE — ELECTRODE BLDE L4IN NONINSULATED EDGE

## (undated) DEVICE — SUPER TURBOVAC 90 INTEGRATED CABLE WAND ICW: Brand: COBLATION

## (undated) DEVICE — STERILE POLYISOPRENE POWDER-FREE SURGICAL GLOVES: Brand: PROTEXIS

## (undated) DEVICE — 4.5 MM INCISOR PLUS STRAIGHT                                    BLADES, POWER/EP-1, VIOLET, PACKAGED                                    6 PER BOX, STERILE

## (undated) DEVICE — DRAPE,SHOULDER,BEACH CHAIR,STERILE: Brand: MEDLINE

## (undated) DEVICE — DECANTER BAG 9": Brand: MEDLINE INDUSTRIES, INC.

## (undated) DEVICE — GLOVE ORTHO 8   MSG9480

## (undated) DEVICE — ALCOHOL RUBBING ISO 16OZ 70%

## (undated) DEVICE — STRAP,POSITIONING,KNEE/BODY,FOAM,4X60": Brand: MEDLINE

## (undated) DEVICE — DRESSING ANTIMIC FOAM OPTIFOAM POSTOP ADH 4X14 IN

## (undated) DEVICE — NEEDLE HYPO 18GA L1.5IN PNK S STL HUB POLYPR SHLD REG BVL

## (undated) DEVICE — DRAPE,U/ SHT,SPLIT,PLAS,STERIL: Brand: MEDLINE

## (undated) DEVICE — SUT TAPE 1.3MM NDL WHT/BLU -- ABSRB

## (undated) DEVICE — REM POLYHESIVE ADULT PATIENT RETURN ELECTRODE: Brand: VALLEYLAB

## (undated) DEVICE — HANDPIECE SET WITH COAXIAL HIGH FLOW TIP AND SUCTION TUBE: Brand: INTERPULSE

## (undated) DEVICE — COVER LT HNDL PLAS RIG 1 PER PK

## (undated) DEVICE — PREP SKN CHLRAPRP APL 26ML STR --

## (undated) DEVICE — DERMABOND SKIN ADH 0.7ML -- DERMABOND ADVANCED 12/BX

## (undated) DEVICE — SOLUTION IRRIG 3000ML LAC RINGERS ARTHROMTC PLAS CONT